# Patient Record
Sex: MALE | Race: WHITE | NOT HISPANIC OR LATINO | Employment: FULL TIME | URBAN - METROPOLITAN AREA
[De-identification: names, ages, dates, MRNs, and addresses within clinical notes are randomized per-mention and may not be internally consistent; named-entity substitution may affect disease eponyms.]

---

## 2018-12-21 ENCOUNTER — APPOINTMENT (OUTPATIENT)
Dept: RADIOLOGY | Age: 48
End: 2018-12-21
Payer: COMMERCIAL

## 2018-12-21 ENCOUNTER — TRANSCRIBE ORDERS (OUTPATIENT)
Dept: ADMINISTRATIVE | Age: 48
End: 2018-12-21

## 2018-12-21 DIAGNOSIS — R05.9 COUGH: Primary | ICD-10-CM

## 2018-12-21 DIAGNOSIS — R05.9 COUGH: ICD-10-CM

## 2018-12-21 PROCEDURE — 71046 X-RAY EXAM CHEST 2 VIEWS: CPT

## 2019-03-15 ENCOUNTER — TRANSCRIBE ORDERS (OUTPATIENT)
Dept: ADMINISTRATIVE | Facility: HOSPITAL | Age: 49
End: 2019-03-15

## 2019-03-15 DIAGNOSIS — R56.9 CONVULSIONS, UNSPECIFIED CONVULSION TYPE (HCC): Primary | ICD-10-CM

## 2019-03-15 DIAGNOSIS — R06.02 SHORTNESS OF BREATH: ICD-10-CM

## 2020-10-05 ENCOUNTER — OFFICE VISIT (OUTPATIENT)
Dept: INTERNAL MEDICINE CLINIC | Facility: CLINIC | Age: 50
End: 2020-10-05
Payer: COMMERCIAL

## 2020-10-05 VITALS
TEMPERATURE: 97.7 F | SYSTOLIC BLOOD PRESSURE: 142 MMHG | DIASTOLIC BLOOD PRESSURE: 89 MMHG | OXYGEN SATURATION: 96 % | HEART RATE: 98 BPM | HEIGHT: 75 IN | WEIGHT: 295.4 LBS | BODY MASS INDEX: 36.73 KG/M2

## 2020-10-05 DIAGNOSIS — J45.21 MILD INTERMITTENT ASTHMA WITH EXACERBATION: Primary | ICD-10-CM

## 2020-10-05 DIAGNOSIS — K57.30 DIVERTICULOSIS LARGE INTESTINE W/O PERFORATION OR ABSCESS W/O BLEEDING: ICD-10-CM

## 2020-10-05 DIAGNOSIS — M10.00 IDIOPATHIC GOUT, UNSPECIFIED CHRONICITY, UNSPECIFIED SITE: ICD-10-CM

## 2020-10-05 DIAGNOSIS — J45.20 MILD INTERMITTENT ASTHMA WITHOUT COMPLICATION: ICD-10-CM

## 2020-10-05 DIAGNOSIS — J04.2 ACUTE LARYNGOTRACHEITIS: ICD-10-CM

## 2020-10-05 PROCEDURE — 3725F SCREEN DEPRESSION PERFORMED: CPT | Performed by: INTERNAL MEDICINE

## 2020-10-05 PROCEDURE — 99214 OFFICE O/P EST MOD 30 MIN: CPT | Performed by: INTERNAL MEDICINE

## 2020-10-05 RX ORDER — PREDNISONE 20 MG/1
20 TABLET ORAL DAILY
Qty: 7 TABLET | Refills: 0 | Status: SHIPPED | OUTPATIENT
Start: 2020-10-05 | End: 2020-10-12

## 2020-10-05 RX ORDER — COLCHICINE 0.6 MG/1
0.6 TABLET ORAL DAILY PRN
COMMUNITY
End: 2020-10-14 | Stop reason: SDUPTHER

## 2020-10-05 RX ORDER — BUDESONIDE AND FORMOTEROL FUMARATE DIHYDRATE 160; 4.5 UG/1; UG/1
2 AEROSOL RESPIRATORY (INHALATION) 2 TIMES DAILY
Qty: 1 INHALER | Refills: 0 | Status: SHIPPED | OUTPATIENT
Start: 2020-10-05 | End: 2021-05-13

## 2020-10-05 RX ORDER — ALBUTEROL SULFATE 90 UG/1
2 AEROSOL, METERED RESPIRATORY (INHALATION) EVERY 6 HOURS PRN
COMMUNITY
End: 2021-12-29 | Stop reason: SDUPTHER

## 2020-10-05 RX ORDER — ALLOPURINOL 100 MG/1
100 TABLET ORAL DAILY
COMMUNITY
End: 2021-01-12 | Stop reason: SDUPTHER

## 2020-10-14 ENCOUNTER — OFFICE VISIT (OUTPATIENT)
Dept: INTERNAL MEDICINE CLINIC | Facility: CLINIC | Age: 50
End: 2020-10-14
Payer: COMMERCIAL

## 2020-10-14 VITALS
WEIGHT: 293.6 LBS | HEIGHT: 75 IN | HEART RATE: 92 BPM | TEMPERATURE: 97.7 F | BODY MASS INDEX: 36.5 KG/M2 | DIASTOLIC BLOOD PRESSURE: 82 MMHG | SYSTOLIC BLOOD PRESSURE: 136 MMHG | OXYGEN SATURATION: 96 %

## 2020-10-14 DIAGNOSIS — M10.00 IDIOPATHIC GOUT, UNSPECIFIED CHRONICITY, UNSPECIFIED SITE: Primary | ICD-10-CM

## 2020-10-14 DIAGNOSIS — J45.20 MILD INTERMITTENT ASTHMA WITHOUT COMPLICATION: ICD-10-CM

## 2020-10-14 PROCEDURE — 1036F TOBACCO NON-USER: CPT | Performed by: INTERNAL MEDICINE

## 2020-10-14 PROCEDURE — 99214 OFFICE O/P EST MOD 30 MIN: CPT | Performed by: INTERNAL MEDICINE

## 2020-10-14 RX ORDER — COLCHICINE 0.6 MG/1
0.6 TABLET ORAL 2 TIMES DAILY
Qty: 40 TABLET | Refills: 0 | Status: SHIPPED | OUTPATIENT
Start: 2020-10-14 | End: 2020-11-11

## 2020-10-14 RX ORDER — COLCHICINE 0.6 MG/1
0.6 TABLET ORAL 2 TIMES DAILY
Qty: 40 TABLET | Refills: 0 | Status: SHIPPED | OUTPATIENT
Start: 2020-10-14 | End: 2020-10-14

## 2020-10-14 RX ORDER — PREDNISONE 10 MG/1
10 TABLET ORAL DAILY
COMMUNITY
End: 2020-10-14

## 2020-10-15 ENCOUNTER — TELEPHONE (OUTPATIENT)
Dept: INTERNAL MEDICINE CLINIC | Facility: CLINIC | Age: 50
End: 2020-10-15

## 2020-10-15 DIAGNOSIS — K21.9 GASTROESOPHAGEAL REFLUX DISEASE WITHOUT ESOPHAGITIS: ICD-10-CM

## 2020-10-15 DIAGNOSIS — M25.562 ACUTE PAIN OF LEFT KNEE: Primary | ICD-10-CM

## 2020-10-15 RX ORDER — DICLOFENAC SODIUM 75 MG/1
75 TABLET, DELAYED RELEASE ORAL 2 TIMES DAILY
Qty: 10 TABLET | Refills: 0 | Status: SHIPPED | OUTPATIENT
Start: 2020-10-15 | End: 2020-10-15

## 2020-10-15 RX ORDER — OMEPRAZOLE 20 MG/1
20 CAPSULE, DELAYED RELEASE ORAL
Qty: 15 CAPSULE | Refills: 0 | Status: SHIPPED | OUTPATIENT
Start: 2020-10-15 | End: 2020-12-07

## 2020-10-15 RX ORDER — OMEPRAZOLE 20 MG/1
20 CAPSULE, DELAYED RELEASE ORAL
Qty: 15 CAPSULE | Refills: 0 | Status: SHIPPED | OUTPATIENT
Start: 2020-10-15 | End: 2020-10-15

## 2020-10-15 RX ORDER — DICLOFENAC SODIUM 75 MG/1
75 TABLET, DELAYED RELEASE ORAL 2 TIMES DAILY
Qty: 10 TABLET | Refills: 0 | Status: SHIPPED | OUTPATIENT
Start: 2020-10-15 | End: 2021-01-12 | Stop reason: SDUPTHER

## 2020-10-16 ENCOUNTER — TELEPHONE (OUTPATIENT)
Dept: INTERNAL MEDICINE CLINIC | Facility: CLINIC | Age: 50
End: 2020-10-16

## 2020-11-11 DIAGNOSIS — M10.00 IDIOPATHIC GOUT, UNSPECIFIED CHRONICITY, UNSPECIFIED SITE: ICD-10-CM

## 2020-11-11 RX ORDER — COLCHICINE 0.6 MG/1
0.6 TABLET ORAL 2 TIMES DAILY
Qty: 40 TABLET | Refills: 0 | Status: SHIPPED | OUTPATIENT
Start: 2020-11-11 | End: 2020-11-20 | Stop reason: SDUPTHER

## 2020-11-20 ENCOUNTER — OFFICE VISIT (OUTPATIENT)
Dept: INTERNAL MEDICINE CLINIC | Facility: CLINIC | Age: 50
End: 2020-11-20
Payer: COMMERCIAL

## 2020-11-20 VITALS
OXYGEN SATURATION: 98 % | TEMPERATURE: 97 F | HEIGHT: 75 IN | BODY MASS INDEX: 36.26 KG/M2 | DIASTOLIC BLOOD PRESSURE: 82 MMHG | HEART RATE: 96 BPM | WEIGHT: 291.6 LBS | SYSTOLIC BLOOD PRESSURE: 128 MMHG

## 2020-11-20 DIAGNOSIS — J45.20 MILD INTERMITTENT ASTHMA WITHOUT COMPLICATION: ICD-10-CM

## 2020-11-20 DIAGNOSIS — Z23 NEED FOR IMMUNIZATION AGAINST INFLUENZA: ICD-10-CM

## 2020-11-20 DIAGNOSIS — M10.00 IDIOPATHIC GOUT, UNSPECIFIED CHRONICITY, UNSPECIFIED SITE: Primary | ICD-10-CM

## 2020-11-20 DIAGNOSIS — K57.30 DIVERTICULOSIS LARGE INTESTINE W/O PERFORATION OR ABSCESS W/O BLEEDING: ICD-10-CM

## 2020-11-20 PROCEDURE — 90471 IMMUNIZATION ADMIN: CPT | Performed by: INTERNAL MEDICINE

## 2020-11-20 PROCEDURE — 99214 OFFICE O/P EST MOD 30 MIN: CPT | Performed by: INTERNAL MEDICINE

## 2020-11-20 PROCEDURE — 3008F BODY MASS INDEX DOCD: CPT | Performed by: INTERNAL MEDICINE

## 2020-11-20 PROCEDURE — 1036F TOBACCO NON-USER: CPT | Performed by: INTERNAL MEDICINE

## 2020-11-20 PROCEDURE — 3725F SCREEN DEPRESSION PERFORMED: CPT | Performed by: INTERNAL MEDICINE

## 2020-11-20 PROCEDURE — 90686 IIV4 VACC NO PRSV 0.5 ML IM: CPT | Performed by: INTERNAL MEDICINE

## 2020-11-20 RX ORDER — COLCHICINE 0.6 MG/1
0.6 TABLET ORAL 2 TIMES DAILY
Qty: 60 TABLET | Refills: 1 | Status: SHIPPED | OUTPATIENT
Start: 2020-11-20 | End: 2021-05-21 | Stop reason: SDUPTHER

## 2020-12-06 DIAGNOSIS — K21.9 GASTROESOPHAGEAL REFLUX DISEASE WITHOUT ESOPHAGITIS: ICD-10-CM

## 2020-12-07 RX ORDER — OMEPRAZOLE 20 MG/1
20 CAPSULE, DELAYED RELEASE ORAL
Qty: 60 CAPSULE | Refills: 0 | Status: SHIPPED | OUTPATIENT
Start: 2020-12-07 | End: 2021-01-12 | Stop reason: SDUPTHER

## 2021-01-08 ENCOUNTER — TELEPHONE (OUTPATIENT)
Dept: INTERNAL MEDICINE CLINIC | Facility: CLINIC | Age: 51
End: 2021-01-08

## 2021-01-08 DIAGNOSIS — R42 DIZZINESS: Primary | ICD-10-CM

## 2021-01-08 RX ORDER — MECLIZINE HYDROCHLORIDE 25 MG/1
25 TABLET ORAL 3 TIMES DAILY PRN
Qty: 30 TABLET | Refills: 0 | Status: SHIPPED | OUTPATIENT
Start: 2021-01-08 | End: 2022-05-24

## 2021-01-08 NOTE — TELEPHONE ENCOUNTER
Pt wife called and asked if you can give her a call when you have the chance  She says it is in regards to the pt experiencing vertigo, dizziness, and diarrhea

## 2021-01-08 NOTE — TELEPHONE ENCOUNTER
PT called stating he is having bad vertigo  Has not had issues for the past several years       Requesting a call back at: [phone]

## 2021-01-12 ENCOUNTER — OFFICE VISIT (OUTPATIENT)
Dept: INTERNAL MEDICINE CLINIC | Facility: CLINIC | Age: 51
End: 2021-01-12
Payer: COMMERCIAL

## 2021-01-12 VITALS
DIASTOLIC BLOOD PRESSURE: 86 MMHG | TEMPERATURE: 98 F | OXYGEN SATURATION: 98 % | SYSTOLIC BLOOD PRESSURE: 138 MMHG | HEART RATE: 92 BPM | HEIGHT: 75 IN | BODY MASS INDEX: 36.06 KG/M2 | WEIGHT: 290 LBS

## 2021-01-12 DIAGNOSIS — M10.00 IDIOPATHIC GOUT, UNSPECIFIED CHRONICITY, UNSPECIFIED SITE: Primary | ICD-10-CM

## 2021-01-12 DIAGNOSIS — J45.20 MILD INTERMITTENT ASTHMA WITHOUT COMPLICATION: ICD-10-CM

## 2021-01-12 DIAGNOSIS — K21.9 GASTROESOPHAGEAL REFLUX DISEASE WITHOUT ESOPHAGITIS: ICD-10-CM

## 2021-01-12 DIAGNOSIS — M25.562 ACUTE PAIN OF LEFT KNEE: ICD-10-CM

## 2021-01-12 PROCEDURE — 99213 OFFICE O/P EST LOW 20 MIN: CPT | Performed by: INTERNAL MEDICINE

## 2021-01-12 PROCEDURE — 1036F TOBACCO NON-USER: CPT | Performed by: INTERNAL MEDICINE

## 2021-01-12 PROCEDURE — 3008F BODY MASS INDEX DOCD: CPT | Performed by: INTERNAL MEDICINE

## 2021-01-12 RX ORDER — DICLOFENAC SODIUM 75 MG/1
75 TABLET, DELAYED RELEASE ORAL 2 TIMES DAILY
Qty: 20 TABLET | Refills: 0 | Status: SHIPPED | OUTPATIENT
Start: 2021-01-12 | End: 2021-05-18 | Stop reason: SDUPTHER

## 2021-01-12 RX ORDER — ALLOPURINOL 100 MG/1
100 TABLET ORAL DAILY
Qty: 30 TABLET | Refills: 3 | Status: SHIPPED | OUTPATIENT
Start: 2021-01-12 | End: 2021-05-21 | Stop reason: SDUPTHER

## 2021-01-12 RX ORDER — OMEPRAZOLE 20 MG/1
20 CAPSULE, DELAYED RELEASE ORAL
Qty: 30 CAPSULE | Refills: 0 | Status: SHIPPED | OUTPATIENT
Start: 2021-01-12 | End: 2021-02-03

## 2021-01-12 NOTE — PROGRESS NOTES
Assessment/Plan:    BMI Counseling: Body mass index is 36 25 kg/m²  The BMI is above normal  Nutrition recommendations include decreasing portion sizes, encouraging healthy choices of fruits and vegetables and decreasing fast food intake  Exercise recommendations include moderate physical activity 150 minutes/week  1  Idiopathic gout, unspecified chronicity, unspecified site  -     allopurinol (ZYLOPRIM) 100 mg tablet; Take 1 tablet (100 mg total) by mouth daily    2  Mild intermittent asthma without complication    3  Gastroesophageal reflux disease without esophagitis  -     omeprazole (PriLOSEC) 20 mg delayed release capsule; Take 1 capsule (20 mg total) by mouth daily before breakfast    4  Acute pain of left knee  -     diclofenac (VOLTAREN) 75 mg EC tablet; Take 1 tablet (75 mg total) by mouth 2 (two) times a day    5  Body mass index 36 0-36 9, adult           Subjective:      Patient ID: Reema Lynn is a 48 y o  male  Left foot pain, woke up this morning with the pain no trauma      The following portions of the patient's history were reviewed and updated as appropriate: He  has a past medical history of Asthma, Asthmatic bronchitis, Body mass index 34 0-34 9, adult, Convulsions ([address] ), Diverticulitis, Gastric ulcer, Gout, Left shoulder pain, Lumbar disc herniation with radiculopathy, Osteoarthritis, Pharyngitis, Right knee pain, Rotator cuff syndrome of left shoulder, Routine general medical examination at a health care facility, SOB (shortness of breath), Spontaneous rupture of tendon of left shoulder, Syncope and collapse, and Vestibular neuritis    He   Patient Active Problem List    Diagnosis Date Noted    Gastroesophageal reflux disease without esophagitis 01/12/2021    Diverticulosis large intestine w/o perforation or abscess w/o bleeding 10/05/2020    Body mass index 36 0-36 9, adult 10/05/2020    Gout     Asthma     Diverticulitis      He  has a past surgical history that includes Colon surgery and Sinus surgery  His family history includes Hyperlipidemia in his mother; Hypertension in his mother  He  reports that he has never smoked  He has never used smokeless tobacco  He reports previous alcohol use  No history on file for drug  Current Outpatient Medications   Medication Sig Dispense Refill    albuterol (PROVENTIL HFA,VENTOLIN HFA) 90 mcg/act inhaler Inhale 2 puffs every 6 (six) hours as needed for wheezing      colchicine (COLCRYS) 0 6 mg tablet Take 1 tablet (0 6 mg total) by mouth 2 (two) times a day 60 tablet 1    meclizine (ANTIVERT) 25 mg tablet Take 1 tablet (25 mg total) by mouth 3 (three) times a day as needed for dizziness 30 tablet 0    allopurinol (ZYLOPRIM) 100 mg tablet Take 1 tablet (100 mg total) by mouth daily 30 tablet 3    budesonide-formoterol (SYMBICORT) 160-4 5 mcg/act inhaler Inhale 2 puffs 2 (two) times a day Rinse mouth after use  (Patient not taking: Reported on 11/20/2020) 1 Inhaler 0    diclofenac (VOLTAREN) 75 mg EC tablet Take 1 tablet (75 mg total) by mouth 2 (two) times a day 20 tablet 0    omeprazole (PriLOSEC) 20 mg delayed release capsule Take 1 capsule (20 mg total) by mouth daily before breakfast 30 capsule 0     No current facility-administered medications for this visit  Current Outpatient Medications on File Prior to Visit   Medication Sig    albuterol (PROVENTIL HFA,VENTOLIN HFA) 90 mcg/act inhaler Inhale 2 puffs every 6 (six) hours as needed for wheezing    colchicine (COLCRYS) 0 6 mg tablet Take 1 tablet (0 6 mg total) by mouth 2 (two) times a day    meclizine (ANTIVERT) 25 mg tablet Take 1 tablet (25 mg total) by mouth 3 (three) times a day as needed for dizziness    budesonide-formoterol (SYMBICORT) 160-4 5 mcg/act inhaler Inhale 2 puffs 2 (two) times a day Rinse mouth after use   (Patient not taking: Reported on 11/20/2020)    [DISCONTINUED] allopurinol (ZYLOPRIM) 100 mg tablet Take 100 mg by mouth daily    [DISCONTINUED] diclofenac (VOLTAREN) 75 mg EC tablet Take 1 tablet (75 mg total) by mouth 2 (two) times a day (Patient not taking: Reported on 11/20/2020)    [DISCONTINUED] diclofenac sodium (VOLTAREN) 1 % Apply 2 g topically 4 (four) times a day (Patient not taking: Reported on 1/12/2021)    [DISCONTINUED] HYDROcodone-homatropine (HYCODAN) 5-1 5 mg/5 mL syrup Take 5 mL by mouth 4 (four) times a day as needed for coughMax Daily Amount: 20 mL (Patient not taking: Reported on 11/20/2020)    [DISCONTINUED] omeprazole (PriLOSEC) 20 mg delayed release capsule TAKE 1 CAPSULE (20 MG TOTAL) BY MOUTH DAILY BEFORE BREAKFAST (Patient not taking: Reported on 1/12/2021)     No current facility-administered medications on file prior to visit  He is allergic to cefazolin; metronidazole; and levofloxacin       Review of Systems   Constitutional: Negative for chills and fever  HENT: Negative for congestion, ear pain and sore throat  Eyes: Negative for pain  Respiratory: Negative for cough and shortness of breath  Cardiovascular: Negative for chest pain and leg swelling  Gastrointestinal: Negative for abdominal pain, nausea and vomiting  Endocrine: Negative for polyuria  Genitourinary: Negative for difficulty urinating, frequency and urgency  Musculoskeletal: Positive for arthralgias  Negative for back pain  Skin: Negative for rash  Neurological: Negative for weakness and headaches  Psychiatric/Behavioral: Negative for sleep disturbance  The patient is not nervous/anxious  Objective:      /86 (BP Location: Right arm, Patient Position: Sitting, Cuff Size: Standard)   Pulse 92   Temp 98 °F (36 7 °C) (Temporal)   Ht 6' 3" (1 905 m)   Wt 132 kg (290 lb)   SpO2 98%   BMI 36 25 kg/m²     No results found for this or any previous visit (from the past 1344 hour(s))  Physical Exam  Constitutional:       Appearance: Normal appearance  HENT:      Head: Normocephalic        Right Ear: Tympanic membrane, ear canal and external ear normal       Left Ear: Tympanic membrane, ear canal and external ear normal       Nose: Nose normal  No congestion  Mouth/Throat:      Mouth: Mucous membranes are moist       Pharynx: Oropharynx is clear  No oropharyngeal exudate or posterior oropharyngeal erythema  Eyes:      Extraocular Movements: Extraocular movements intact  Conjunctiva/sclera: Conjunctivae normal       Pupils: Pupils are equal, round, and reactive to light  Neck:      Musculoskeletal: Normal range of motion and neck supple  Cardiovascular:      Rate and Rhythm: Normal rate and regular rhythm  Heart sounds: Normal heart sounds  No murmur  Pulmonary:      Effort: Pulmonary effort is normal       Breath sounds: Normal breath sounds  No wheezing or rales  Abdominal:      General: Bowel sounds are normal  There is no distension  Palpations: Abdomen is soft  Tenderness: There is no abdominal tenderness  Musculoskeletal: Normal range of motion  Right lower leg: No edema  Left lower leg: No edema  Comments: Left foot the 5th metatarsal phalangeal joint tender movement painful swelling present skin minimal redness   Lymphadenopathy:      Cervical: No cervical adenopathy  Skin:     General: Skin is warm  Neurological:      General: No focal deficit present  Mental Status: He is alert and oriented to person, place, and time

## 2021-02-03 DIAGNOSIS — K21.9 GASTROESOPHAGEAL REFLUX DISEASE WITHOUT ESOPHAGITIS: ICD-10-CM

## 2021-02-03 RX ORDER — OMEPRAZOLE 20 MG/1
20 CAPSULE, DELAYED RELEASE ORAL
Qty: 60 CAPSULE | Refills: 0 | Status: SHIPPED | OUTPATIENT
Start: 2021-02-03 | End: 2021-02-19

## 2021-02-18 DIAGNOSIS — K21.9 GASTROESOPHAGEAL REFLUX DISEASE WITHOUT ESOPHAGITIS: ICD-10-CM

## 2021-02-19 RX ORDER — OMEPRAZOLE 20 MG/1
20 CAPSULE, DELAYED RELEASE ORAL
Qty: 90 CAPSULE | Refills: 1 | Status: SHIPPED | OUTPATIENT
Start: 2021-02-19

## 2021-03-10 DIAGNOSIS — Z23 ENCOUNTER FOR IMMUNIZATION: ICD-10-CM

## 2021-03-22 ENCOUNTER — IMMUNIZATIONS (OUTPATIENT)
Dept: FAMILY MEDICINE CLINIC | Facility: HOSPITAL | Age: 51
End: 2021-03-22

## 2021-03-22 DIAGNOSIS — Z23 ENCOUNTER FOR IMMUNIZATION: Primary | ICD-10-CM

## 2021-03-22 PROCEDURE — 0001A SARS-COV-2 / COVID-19 MRNA VACCINE (PFIZER-BIONTECH) 30 MCG: CPT

## 2021-03-22 PROCEDURE — 91300 SARS-COV-2 / COVID-19 MRNA VACCINE (PFIZER-BIONTECH) 30 MCG: CPT

## 2021-04-14 ENCOUNTER — IMMUNIZATIONS (OUTPATIENT)
Dept: FAMILY MEDICINE CLINIC | Facility: HOSPITAL | Age: 51
End: 2021-04-14

## 2021-04-14 DIAGNOSIS — Z23 ENCOUNTER FOR IMMUNIZATION: Primary | ICD-10-CM

## 2021-04-14 PROCEDURE — 0002A SARS-COV-2 / COVID-19 MRNA VACCINE (PFIZER-BIONTECH) 30 MCG: CPT

## 2021-04-14 PROCEDURE — 91300 SARS-COV-2 / COVID-19 MRNA VACCINE (PFIZER-BIONTECH) 30 MCG: CPT

## 2021-05-07 ENCOUNTER — TELEPHONE (OUTPATIENT)
Dept: INTERNAL MEDICINE CLINIC | Facility: CLINIC | Age: 51
End: 2021-05-07

## 2021-05-07 DIAGNOSIS — M51.26 DISC DISPLACEMENT, LUMBAR: Primary | ICD-10-CM

## 2021-05-07 RX ORDER — TRAMADOL HYDROCHLORIDE 50 MG/1
50 TABLET ORAL EVERY 8 HOURS PRN
Qty: 20 TABLET | Refills: 0 | Status: SHIPPED | OUTPATIENT
Start: 2021-05-07 | End: 2021-05-13 | Stop reason: SDUPTHER

## 2021-05-07 RX ORDER — CYCLOBENZAPRINE HCL 10 MG
10 TABLET ORAL 3 TIMES DAILY PRN
Qty: 20 TABLET | Refills: 0 | Status: SHIPPED | OUTPATIENT
Start: 2021-05-07 | End: 2021-05-13 | Stop reason: SDUPTHER

## 2021-05-07 RX ORDER — METHYLPREDNISOLONE 4 MG/1
TABLET ORAL
Qty: 21 EACH | Refills: 0 | Status: SHIPPED | OUTPATIENT
Start: 2021-05-07 | End: 2021-05-19

## 2021-05-13 ENCOUNTER — OFFICE VISIT (OUTPATIENT)
Dept: INTERNAL MEDICINE CLINIC | Facility: CLINIC | Age: 51
End: 2021-05-13
Payer: COMMERCIAL

## 2021-05-13 VITALS
HEIGHT: 75 IN | WEIGHT: 295 LBS | DIASTOLIC BLOOD PRESSURE: 86 MMHG | HEART RATE: 92 BPM | OXYGEN SATURATION: 97 % | BODY MASS INDEX: 36.68 KG/M2 | TEMPERATURE: 98.2 F | SYSTOLIC BLOOD PRESSURE: 136 MMHG

## 2021-05-13 DIAGNOSIS — J45.20 MILD INTERMITTENT ASTHMA WITHOUT COMPLICATION: ICD-10-CM

## 2021-05-13 DIAGNOSIS — K21.9 GASTROESOPHAGEAL REFLUX DISEASE WITHOUT ESOPHAGITIS: ICD-10-CM

## 2021-05-13 DIAGNOSIS — M51.16 LUMBAR DISC HERNIATION WITH RADICULOPATHY: Primary | ICD-10-CM

## 2021-05-13 PROCEDURE — 3725F SCREEN DEPRESSION PERFORMED: CPT | Performed by: INTERNAL MEDICINE

## 2021-05-13 PROCEDURE — 99214 OFFICE O/P EST MOD 30 MIN: CPT | Performed by: INTERNAL MEDICINE

## 2021-05-13 RX ORDER — CYCLOBENZAPRINE HCL 10 MG
10 TABLET ORAL 3 TIMES DAILY PRN
Qty: 20 TABLET | Refills: 0 | Status: SHIPPED | OUTPATIENT
Start: 2021-05-13 | End: 2021-05-19

## 2021-05-13 RX ORDER — IBUPROFEN 800 MG/1
800 TABLET ORAL EVERY 8 HOURS SCHEDULED
Qty: 30 TABLET | Refills: 0 | Status: SHIPPED | OUTPATIENT
Start: 2021-05-13 | End: 2021-05-19

## 2021-05-13 RX ORDER — TRAMADOL HYDROCHLORIDE 50 MG/1
50 TABLET ORAL EVERY 8 HOURS PRN
Qty: 20 TABLET | Refills: 0 | Status: SHIPPED | OUTPATIENT
Start: 2021-05-13 | End: 2022-05-24

## 2021-05-13 NOTE — PROGRESS NOTES
Assessment/Plan:             1  Lumbar disc herniation with radiculopathy  -     MRI lumbar spine wo contrast; Future; Expected date: 05/13/2021  -     ibuprofen (MOTRIN) 800 mg tablet; Take 1 tablet (800 mg total) by mouth every 8 (eight) hours  -     cyclobenzaprine (FLEXERIL) 10 mg tablet; Take 1 tablet (10 mg total) by mouth 3 (three) times a day as needed for muscle spasms  -     traMADol (ULTRAM) 50 mg tablet; Take 1 tablet (50 mg total) by mouth every 8 (eight) hours as needed for moderate pain    2  Mild intermittent asthma without complication    3  Gastroesophageal reflux disease without esophagitis           Subjective:      Patient ID: Lulu Darby is a 46 y o  male  Low back pain, denies any trauma, does have lumbar disc disease, also recently started to her difficulty with the urine where he feels like he has to push more to pee not having good control on the pee, feels like leaking sometimes    Back Pain  Pertinent negatives include no abdominal pain, chest pain, dysuria, fever, headaches or weakness  The following portions of the patient's history were reviewed and updated as appropriate: He  has a past medical history of Asthma, Asthmatic bronchitis, Body mass index 34 0-34 9, adult, Convulsions ([facility] [address] ), Diverticulitis, Diverticulosis, Gastric ulcer, Gout, Left shoulder pain, Lumbar disc herniation with radiculopathy, Osteoarthritis, Pharyngitis, Pulmonary embolism ([facility] [address] ) (08/15/2012), Right knee pain, Rotator cuff syndrome of left shoulder, Routine general medical examination at a health care facility, SOB (shortness of breath), Spontaneous rupture of tendon of left shoulder, Syncope and collapse, and Vestibular neuritis    He   Patient Active Problem List    Diagnosis Date Noted    Lumbar disc herniation with radiculopathy     Gastroesophageal reflux disease without esophagitis 01/12/2021    Diverticulosis large intestine w/o perforation or abscess w/o bleeding 10/05/2020    Body mass index 36 0-36 9, adult 10/05/2020    Gout     Asthma     Diverticulitis      He  has a past surgical history that includes Sinus surgery; EGD AND COLONOSCOPY (01/31/2011); and Colon surgery (08/08/2012)  His family history includes Hyperlipidemia in his mother; Hypertension in his mother  He  reports that he has never smoked  He has never used smokeless tobacco  He reports previous alcohol use  No history on file for drug  Current Outpatient Medications   Medication Sig Dispense Refill    albuterol (PROVENTIL HFA,VENTOLIN HFA) 90 mcg/act inhaler Inhale 2 puffs every 6 (six) hours as needed for wheezing      allopurinol (ZYLOPRIM) 100 mg tablet Take 1 tablet (100 mg total) by mouth daily 30 tablet 3    colchicine (COLCRYS) 0 6 mg tablet Take 1 tablet (0 6 mg total) by mouth 2 (two) times a day 60 tablet 1    cyclobenzaprine (FLEXERIL) 10 mg tablet Take 1 tablet (10 mg total) by mouth 3 (three) times a day as needed for muscle spasms 20 tablet 0    diclofenac (VOLTAREN) 75 mg EC tablet Take 1 tablet (75 mg total) by mouth 2 (two) times a day 20 tablet 0    omeprazole (PriLOSEC) 20 mg delayed release capsule TAKE 1 CAPSULE (20 MG TOTAL) BY MOUTH DAILY BEFORE BREAKFAST 90 capsule 1    traMADol (ULTRAM) 50 mg tablet Take 1 tablet (50 mg total) by mouth every 8 (eight) hours as needed for moderate pain 20 tablet 0    ibuprofen (MOTRIN) 800 mg tablet Take 1 tablet (800 mg total) by mouth every 8 (eight) hours 30 tablet 0    meclizine (ANTIVERT) 25 mg tablet Take 1 tablet (25 mg total) by mouth 3 (three) times a day as needed for dizziness (Patient not taking: Reported on 5/13/2021) 30 tablet 0    methylPREDNISolone 4 MG tablet therapy pack Use as directed on package (Patient not taking: Reported on 5/13/2021) 21 each 0     No current facility-administered medications for this visit        Current Outpatient Medications on File Prior to Visit   Medication Sig    albuterol (PROVENTIL HFA,VENTOLIN HFA) 90 mcg/act inhaler Inhale 2 puffs every 6 (six) hours as needed for wheezing    allopurinol (ZYLOPRIM) 100 mg tablet Take 1 tablet (100 mg total) by mouth daily    colchicine (COLCRYS) 0 6 mg tablet Take 1 tablet (0 6 mg total) by mouth 2 (two) times a day    diclofenac (VOLTAREN) 75 mg EC tablet Take 1 tablet (75 mg total) by mouth 2 (two) times a day    omeprazole (PriLOSEC) 20 mg delayed release capsule TAKE 1 CAPSULE (20 MG TOTAL) BY MOUTH DAILY BEFORE BREAKFAST    [DISCONTINUED] cyclobenzaprine (FLEXERIL) 10 mg tablet Take 1 tablet (10 mg total) by mouth 3 (three) times a day as needed for muscle spasms    [DISCONTINUED] traMADol (ULTRAM) 50 mg tablet Take 1 tablet (50 mg total) by mouth every 8 (eight) hours as needed for moderate pain    meclizine (ANTIVERT) 25 mg tablet Take 1 tablet (25 mg total) by mouth 3 (three) times a day as needed for dizziness (Patient not taking: Reported on 5/13/2021)    methylPREDNISolone 4 MG tablet therapy pack Use as directed on package (Patient not taking: Reported on 5/13/2021)    [DISCONTINUED] budesonide-formoterol (SYMBICORT) 160-4 5 mcg/act inhaler Inhale 2 puffs 2 (two) times a day Rinse mouth after use  (Patient not taking: Reported on 11/20/2020)     No current facility-administered medications on file prior to visit  He is allergic to cefazolin; metronidazole; and levofloxacin       Review of Systems   Constitutional: Negative for chills and fever  HENT: Negative for congestion, ear pain and sore throat  Eyes: Negative for pain  Respiratory: Negative for cough and shortness of breath  Cardiovascular: Negative for chest pain and leg swelling  Gastrointestinal: Negative for abdominal pain, nausea and vomiting  Endocrine: Negative for polyuria  Genitourinary: Positive for difficulty urinating  Negative for dysuria, frequency and urgency  Musculoskeletal: Positive for back pain  Negative for arthralgias  Skin: Negative for rash  Neurological: Negative for weakness and headaches  Psychiatric/Behavioral: Negative for sleep disturbance  The patient is not nervous/anxious  Objective:      /86 (BP Location: Left arm, Patient Position: Sitting, Cuff Size: Standard)   Pulse 92   Temp 98 2 °F (36 8 °C) (Temporal)   Ht 6' 3" (1 905 m)   Wt 134 kg (295 lb)   SpO2 97%   BMI 36 87 kg/m²     No results found for this or any previous visit (from the past 1344 hour(s))  Physical Exam  Constitutional:       Appearance: Normal appearance  HENT:      Head: Normocephalic  Right Ear: Tympanic membrane, ear canal and external ear normal       Left Ear: Tympanic membrane, ear canal and external ear normal       Nose: Nose normal  No congestion  Mouth/Throat:      Mouth: Mucous membranes are moist       Pharynx: Oropharynx is clear  No oropharyngeal exudate or posterior oropharyngeal erythema  Eyes:      Extraocular Movements: Extraocular movements intact  Conjunctiva/sclera: Conjunctivae normal       Pupils: Pupils are equal, round, and reactive to light  Neck:      Musculoskeletal: Normal range of motion and neck supple  Cardiovascular:      Rate and Rhythm: Normal rate and regular rhythm  Heart sounds: Normal heart sounds  No murmur  Pulmonary:      Effort: Pulmonary effort is normal       Breath sounds: Normal breath sounds  No wheezing or rales  Abdominal:      General: Bowel sounds are normal  There is no distension  Palpations: Abdomen is soft  Tenderness: There is no abdominal tenderness  Musculoskeletal:      Right lower leg: No edema  Left lower leg: No edema  Comments: Low back exam-right side paraspinous tenderness present, SLR positive bilaterally, movement painful,   Lymphadenopathy:      Cervical: No cervical adenopathy  Skin:     General: Skin is warm  Neurological:      General: No focal deficit present        Mental Status: He is alert and oriented to person, place, and time        Deep Tendon Reflexes: Reflexes normal       Comments: No saddle shaped Anesthesia

## 2021-05-18 DIAGNOSIS — M25.562 ACUTE PAIN OF LEFT KNEE: ICD-10-CM

## 2021-05-18 NOTE — TELEPHONE ENCOUNTER
HIS BACK IS FEELING BETTER BUT HE IS HAVING A HARD TIME MOVING HIS RIGHT LEG AND THINKS IT COULD BE RELATED, HE SAID HE CANT BEND, LAY ON IT OR WALK, NO REDNESS OR RASH HE THINKS IT IS HIS BACK AND HE SAID BEFORE YOU GAVE HIM SOMETHING AND IT WORKED FOR HIM, HE WOULD LIKE TO SPEAK TO YOU ABOUT IT AND ASK THAT YOU CALL HIM

## 2021-05-19 ENCOUNTER — OFFICE VISIT (OUTPATIENT)
Dept: INTERNAL MEDICINE CLINIC | Facility: CLINIC | Age: 51
End: 2021-05-19
Payer: COMMERCIAL

## 2021-05-19 VITALS
BODY MASS INDEX: 35.56 KG/M2 | WEIGHT: 286 LBS | OXYGEN SATURATION: 96 % | SYSTOLIC BLOOD PRESSURE: 136 MMHG | HEART RATE: 120 BPM | TEMPERATURE: 99 F | HEIGHT: 75 IN | DIASTOLIC BLOOD PRESSURE: 82 MMHG

## 2021-05-19 DIAGNOSIS — J45.20 MILD INTERMITTENT ASTHMA WITHOUT COMPLICATION: ICD-10-CM

## 2021-05-19 DIAGNOSIS — M51.16 LUMBAR DISC HERNIATION WITH RADICULOPATHY: Primary | ICD-10-CM

## 2021-05-19 DIAGNOSIS — M10.00 IDIOPATHIC GOUT, UNSPECIFIED CHRONICITY, UNSPECIFIED SITE: ICD-10-CM

## 2021-05-19 PROCEDURE — 99213 OFFICE O/P EST LOW 20 MIN: CPT | Performed by: INTERNAL MEDICINE

## 2021-05-19 PROCEDURE — 96372 THER/PROPH/DIAG INJ SC/IM: CPT | Performed by: INTERNAL MEDICINE

## 2021-05-19 RX ORDER — DICLOFENAC SODIUM 75 MG/1
75 TABLET, DELAYED RELEASE ORAL 2 TIMES DAILY
Qty: 60 TABLET | Refills: 0 | Status: SHIPPED | OUTPATIENT
Start: 2021-05-19 | End: 2021-06-11

## 2021-05-19 RX ORDER — METHOCARBAMOL 750 MG/1
750 TABLET, FILM COATED ORAL EVERY 6 HOURS PRN
Qty: 30 TABLET | Refills: 0 | Status: SHIPPED | OUTPATIENT
Start: 2021-05-19 | End: 2021-08-09

## 2021-05-19 NOTE — PROGRESS NOTES
Assessment/Plan:             1  Lumbar disc herniation with radiculopathy  -     methocarbamol (ROBAXIN) 750 mg tablet; Take 1 tablet (750 mg total) by mouth every 6 (six) hours as needed for muscle spasms  -     ketorolac (TORADOL) injection 30 mg    2  Mild intermittent asthma without complication    3  Idiopathic gout, unspecified chronicity, unspecified site           Subjective:      Patient ID: Francisca Carreon is a 46 y o  male  Right leg pain, thigh pain and lower leg pain, feels burning sensation, low back pain is better      The following portions of the patient's history were reviewed and updated as appropriate: He  has a past medical history of Asthma, Asthmatic bronchitis, Body mass index 34 0-34 9, adult, Convulsions ([address] ), Diverticulitis, Diverticulosis, Gastric ulcer, Gout, Left shoulder pain, Lumbar disc herniation with radiculopathy, Osteoarthritis, Pharyngitis, Pulmonary embolism ([address] ) (08/15/2012), Right knee pain, Rotator cuff syndrome of left shoulder, Routine general medical examination at a health care facility, SOB (shortness of breath), Spontaneous rupture of tendon of left shoulder, Syncope and collapse, and Vestibular neuritis  He   Patient Active Problem List    Diagnosis Date Noted    Lumbar disc herniation with radiculopathy     Gastroesophageal reflux disease without esophagitis 01/12/2021    Diverticulosis large intestine w/o perforation or abscess w/o bleeding 10/05/2020    Body mass index 36 0-36 9, adult 10/05/2020    Gout     Asthma     Diverticulitis      He  has a past surgical history that includes Sinus surgery; EGD AND COLONOSCOPY (01/31/2011); and Colon surgery (08/08/2012)  His family history includes Hyperlipidemia in his mother; Hypertension in his mother  He  reports that he has never smoked  He has never used smokeless tobacco  He reports previous alcohol use  No history on file for drug    Current Outpatient Medications   Medication Sig Dispense Refill    albuterol (PROVENTIL HFA,VENTOLIN HFA) 90 mcg/act inhaler Inhale 2 puffs every 6 (six) hours as needed for wheezing      allopurinol (ZYLOPRIM) 100 mg tablet Take 1 tablet (100 mg total) by mouth daily 30 tablet 3    diclofenac (VOLTAREN) 75 mg EC tablet Take 1 tablet (75 mg total) by mouth 2 (two) times a day 60 tablet 0    omeprazole (PriLOSEC) 20 mg delayed release capsule TAKE 1 CAPSULE (20 MG TOTAL) BY MOUTH DAILY BEFORE BREAKFAST 90 capsule 1    traMADol (ULTRAM) 50 mg tablet Take 1 tablet (50 mg total) by mouth every 8 (eight) hours as needed for moderate pain 20 tablet 0    colchicine (COLCRYS) 0 6 mg tablet Take 1 tablet (0 6 mg total) by mouth 2 (two) times a day (Patient not taking: Reported on 5/19/2021) 60 tablet 1    meclizine (ANTIVERT) 25 mg tablet Take 1 tablet (25 mg total) by mouth 3 (three) times a day as needed for dizziness (Patient not taking: Reported on 5/13/2021) 30 tablet 0    methocarbamol (ROBAXIN) 750 mg tablet Take 1 tablet (750 mg total) by mouth every 6 (six) hours as needed for muscle spasms 30 tablet 0     Current Facility-Administered Medications   Medication Dose Route Frequency Provider Last Rate Last Admin    ketorolac (TORADOL) injection 30 mg  30 mg Intramuscular Once Catalina Up MD         Current Outpatient Medications on File Prior to Visit   Medication Sig    albuterol (PROVENTIL HFA,VENTOLIN HFA) 90 mcg/act inhaler Inhale 2 puffs every 6 (six) hours as needed for wheezing    allopurinol (ZYLOPRIM) 100 mg tablet Take 1 tablet (100 mg total) by mouth daily    diclofenac (VOLTAREN) 75 mg EC tablet Take 1 tablet (75 mg total) by mouth 2 (two) times a day    omeprazole (PriLOSEC) 20 mg delayed release capsule TAKE 1 CAPSULE (20 MG TOTAL) BY MOUTH DAILY BEFORE BREAKFAST    traMADol (ULTRAM) 50 mg tablet Take 1 tablet (50 mg total) by mouth every 8 (eight) hours as needed for moderate pain    colchicine (COLCRYS) 0 6 mg tablet Take 1 tablet (0 6 mg total) by mouth 2 (two) times a day (Patient not taking: Reported on 5/19/2021)    meclizine (ANTIVERT) 25 mg tablet Take 1 tablet (25 mg total) by mouth 3 (three) times a day as needed for dizziness (Patient not taking: Reported on 5/13/2021)     No current facility-administered medications on file prior to visit  He is allergic to cefazolin; metronidazole; and levofloxacin       Review of Systems   Constitutional: Negative for chills and fever  HENT: Negative for congestion, ear pain and sore throat  Eyes: Negative for pain  Respiratory: Negative for cough and shortness of breath  Cardiovascular: Negative for chest pain and leg swelling  Gastrointestinal: Negative for abdominal pain, nausea and vomiting  Endocrine: Negative for polyuria  Genitourinary: Negative for dysuria, frequency and urgency  Musculoskeletal: Positive for back pain  Negative for arthralgias  Skin: Negative for rash  Neurological: Negative for weakness and headaches  Psychiatric/Behavioral: Negative for sleep disturbance  The patient is not nervous/anxious  Objective:      /82 (BP Location: Left arm, Patient Position: Standing, Cuff Size: Standard)   Pulse (!) 120   Temp 99 °F (37 2 °C) (Axillary)   Ht 6' 3" (1 905 m)   Wt 130 kg (286 lb)   SpO2 96%   BMI 35 75 kg/m²     No results found for this or any previous visit (from the past 1344 hour(s))  Physical Exam  Constitutional:       Appearance: Normal appearance  HENT:      Head: Normocephalic  Right Ear: External ear normal       Left Ear: External ear normal       Nose: No congestion  Mouth/Throat:      Pharynx: No oropharyngeal exudate or posterior oropharyngeal erythema  Eyes:      Extraocular Movements: Extraocular movements intact  Conjunctiva/sclera: Conjunctivae normal       Pupils: Pupils are equal, round, and reactive to light  Neck:      Musculoskeletal: Normal range of motion and neck supple     Cardiovascular: Rate and Rhythm: Normal rate and regular rhythm  Heart sounds: Normal heart sounds  No murmur  Pulmonary:      Effort: Pulmonary effort is normal       Breath sounds: Normal breath sounds  No wheezing or rales  Abdominal:      Palpations: Abdomen is soft  Musculoskeletal:      Right lower leg: No edema  Left lower leg: No edema  Comments: Right leg, movement painful at the hip area, walking difficulty due to pain, right thigh muscle spasm laterally   Lymphadenopathy:      Cervical: No cervical adenopathy  Skin:     General: Skin is warm  Neurological:      General: No focal deficit present  Mental Status: He is alert and oriented to person, place, and time

## 2021-05-20 ENCOUNTER — TELEPHONE (OUTPATIENT)
Dept: INTERNAL MEDICINE CLINIC | Facility: CLINIC | Age: 51
End: 2021-05-20

## 2021-05-20 NOTE — TELEPHONE ENCOUNTER
Spoke with pt he is not much better  He says he is still having pain in the back but not as much burning/pain in the leg, certain ways he walks he will get a very sharp pain in the back/leg so he has to be very careful, he says it is hard to say if he got any relief from the injection   I told him about the MRI being denied and the need for peer to peer  Pt says to call his wife Mylinda Kocher back [phone] because he might be on a few work phonecalls

## 2021-05-20 NOTE — TELEPHONE ENCOUNTER
Prior Auth was submitted and denied  Dr Jamaal Whitney did a Peer to Peer discussion and it was approved  Approval confirmation: [id number]   MRI Lumbar spine wo contrast     Appt   is scheduled for 5/21/21 @ 8:45am

## 2021-05-21 ENCOUNTER — APPOINTMENT (OUTPATIENT)
Dept: LAB | Age: 51
End: 2021-05-21
Payer: COMMERCIAL

## 2021-05-21 ENCOUNTER — HOSPITAL ENCOUNTER (OUTPATIENT)
Dept: MRI IMAGING | Facility: HOSPITAL | Age: 51
Discharge: HOME | End: 2021-05-21
Payer: COMMERCIAL

## 2021-05-21 ENCOUNTER — TRANSCRIBE ORDERS (OUTPATIENT)
Dept: ADMINISTRATIVE | Age: 51
End: 2021-05-21

## 2021-05-21 DIAGNOSIS — M51.16 LUMBAR DISC HERNIATION WITH RADICULOPATHY: ICD-10-CM

## 2021-05-21 DIAGNOSIS — M10.00 IDIOPATHIC GOUT, UNSPECIFIED CHRONICITY, UNSPECIFIED SITE: ICD-10-CM

## 2021-05-21 LAB
ALBUMIN SERPL BCP-MCNC: 3.7 G/DL (ref 3.5–5)
ALP SERPL-CCNC: 88 U/L (ref 46–116)
ALT SERPL W P-5'-P-CCNC: 52 U/L (ref 12–78)
ANION GAP SERPL CALCULATED.3IONS-SCNC: 6 MMOL/L (ref 4–13)
AST SERPL W P-5'-P-CCNC: 27 U/L (ref 5–45)
BASOPHILS # BLD AUTO: 0.04 THOUSANDS/ΜL (ref 0–0.1)
BASOPHILS NFR BLD AUTO: 0 % (ref 0–1)
BILIRUB SERPL-MCNC: 0.5 MG/DL (ref 0.2–1)
BUN SERPL-MCNC: 14 MG/DL (ref 5–25)
CALCIUM SERPL-MCNC: 9.2 MG/DL (ref 8.3–10.1)
CHLORIDE SERPL-SCNC: 109 MMOL/L (ref 100–108)
CHOLEST SERPL-MCNC: 243 MG/DL (ref 50–200)
CO2 SERPL-SCNC: 28 MMOL/L (ref 21–32)
CREAT SERPL-MCNC: 1.02 MG/DL (ref 0.6–1.3)
EOSINOPHIL # BLD AUTO: 0.37 THOUSAND/ΜL (ref 0–0.61)
EOSINOPHIL NFR BLD AUTO: 4 % (ref 0–6)
ERYTHROCYTE [DISTWIDTH] IN BLOOD BY AUTOMATED COUNT: 12.5 % (ref 11.6–15.1)
EST. AVERAGE GLUCOSE BLD GHB EST-MCNC: 123 MG/DL
GFR SERPL CREATININE-BSD FRML MDRD: 85 ML/MIN/1.73SQ M
GLUCOSE P FAST SERPL-MCNC: 91 MG/DL (ref 65–99)
HBA1C MFR BLD: 5.9 %
HCT VFR BLD AUTO: 47.7 % (ref 36.5–49.3)
HDLC SERPL-MCNC: 47 MG/DL
HGB BLD-MCNC: 15.7 G/DL (ref 12–17)
IMM GRANULOCYTES # BLD AUTO: 0.04 THOUSAND/UL (ref 0–0.2)
IMM GRANULOCYTES NFR BLD AUTO: 0 % (ref 0–2)
LDLC SERPL CALC-MCNC: 156 MG/DL (ref 0–100)
LYMPHOCYTES # BLD AUTO: 2.9 THOUSANDS/ΜL (ref 0.6–4.47)
LYMPHOCYTES NFR BLD AUTO: 31 % (ref 14–44)
MCH RBC QN AUTO: 29.5 PG (ref 26.8–34.3)
MCHC RBC AUTO-ENTMCNC: 32.9 G/DL (ref 31.4–37.4)
MCV RBC AUTO: 90 FL (ref 82–98)
MONOCYTES # BLD AUTO: 0.81 THOUSAND/ΜL (ref 0.17–1.22)
MONOCYTES NFR BLD AUTO: 9 % (ref 4–12)
NEUTROPHILS # BLD AUTO: 5.1 THOUSANDS/ΜL (ref 1.85–7.62)
NEUTS SEG NFR BLD AUTO: 56 % (ref 43–75)
NRBC BLD AUTO-RTO: 0 /100 WBCS
PLATELET # BLD AUTO: 263 THOUSANDS/UL (ref 149–390)
PMV BLD AUTO: 9 FL (ref 8.9–12.7)
POTASSIUM SERPL-SCNC: 4.2 MMOL/L (ref 3.5–5.3)
PROT SERPL-MCNC: 7.5 G/DL (ref 6.4–8.2)
RBC # BLD AUTO: 5.32 MILLION/UL (ref 3.88–5.62)
SODIUM SERPL-SCNC: 143 MMOL/L (ref 136–145)
TRIGL SERPL-MCNC: 198 MG/DL
TSH SERPL DL<=0.05 MIU/L-ACNC: 3.43 UIU/ML (ref 0.36–3.74)
URATE SERPL-MCNC: 8.5 MG/DL (ref 4.2–8)
WBC # BLD AUTO: 9.26 THOUSAND/UL (ref 4.31–10.16)

## 2021-05-21 PROCEDURE — 72148 MRI LUMBAR SPINE W/O DYE: CPT

## 2021-05-21 PROCEDURE — 36415 COLL VENOUS BLD VENIPUNCTURE: CPT

## 2021-05-21 PROCEDURE — 84443 ASSAY THYROID STIM HORMONE: CPT

## 2021-05-21 PROCEDURE — 85025 COMPLETE CBC W/AUTO DIFF WBC: CPT

## 2021-05-21 PROCEDURE — G1004 CDSM NDSC: HCPCS

## 2021-05-21 PROCEDURE — 80053 COMPREHEN METABOLIC PANEL: CPT

## 2021-05-21 PROCEDURE — 84550 ASSAY OF BLOOD/URIC ACID: CPT

## 2021-05-21 PROCEDURE — 83036 HEMOGLOBIN GLYCOSYLATED A1C: CPT

## 2021-05-21 PROCEDURE — 80061 LIPID PANEL: CPT

## 2021-05-21 RX ORDER — KETOROLAC TROMETHAMINE 30 MG/ML
30 INJECTION, SOLUTION INTRAMUSCULAR; INTRAVENOUS ONCE
Status: COMPLETED | OUTPATIENT
Start: 2021-05-21 | End: 2021-05-21

## 2021-05-21 RX ORDER — COLCHICINE 0.6 MG/1
0.6 TABLET ORAL 2 TIMES DAILY
Qty: 60 TABLET | Refills: 0 | Status: SHIPPED | OUTPATIENT
Start: 2021-05-21 | End: 2021-06-28

## 2021-05-21 RX ORDER — ALLOPURINOL 100 MG/1
100 TABLET ORAL DAILY
Qty: 30 TABLET | Refills: 3 | Status: SHIPPED | OUTPATIENT
Start: 2021-05-21 | End: 2022-06-06 | Stop reason: SDUPTHER

## 2021-05-21 RX ADMIN — KETOROLAC TROMETHAMINE 30 MG: 30 INJECTION, SOLUTION INTRAMUSCULAR; INTRAVENOUS at 15:08

## 2021-05-27 DIAGNOSIS — M51.16 LUMBAR DISC HERNIATION WITH RADICULOPATHY: Primary | ICD-10-CM

## 2021-05-28 ENCOUNTER — TELEPHONE (OUTPATIENT)
Dept: PAIN MEDICINE | Facility: CLINIC | Age: 51
End: 2021-05-28

## 2021-05-28 ENCOUNTER — TELEPHONE (OUTPATIENT)
Dept: INTERNAL MEDICINE CLINIC | Facility: CLINIC | Age: 51
End: 2021-05-28

## 2021-05-28 DIAGNOSIS — M51.16 LUMBAR DISC HERNIATION WITH RADICULOPATHY: Primary | ICD-10-CM

## 2021-05-28 NOTE — TELEPHONE ENCOUNTER
Patient called requesting to schedule his injection  He has a direct referral from his PCP Dr Finch Has in [address] [place] [address]  His consultation is scheduled on 6/14/21   Please advise, moody    Call back# [phone]

## 2021-05-28 NOTE — TELEPHONE ENCOUNTER
Pt says you are sending him for epidural and pain management is putting him in for an appt 6/14/21   Pt is under impression that it was for him to get the epidural today and if this is the case their office is saying that your referral need to say that because referral in system is for a consultation

## 2021-05-28 NOTE — TELEPHONE ENCOUNTER
S/w the patient and he stated he has his MRI and as per Сергей Antonio he would like to be scheduled for a epidural  He stated that the office made a mistake and he is scheduled for an consult instead of an injection  Reviewed that John Julianlos is out of the office until 6/1  and that he could review his records upon his return but that he might need an office visit so that John Sauer can evaluate him himself  He denies taking anticoagulants or Antibx currently and had booster 4 weeks ago  He verbalized understanding and will await your response

## 2021-06-02 NOTE — TELEPHONE ENCOUNTER
Pt was returning a call from someone    Per notes he wanted to be seen sooner  Pt took available appt for tomorrow 7:30a

## 2021-06-02 NOTE — TELEPHONE ENCOUNTER
S/w the patient and reviewed  He would like to be seen earlier if possible  He is scheduled for 6/14  If there is a cancellation, please contact   thanks

## 2021-06-03 ENCOUNTER — CONSULT (OUTPATIENT)
Dept: PAIN MEDICINE | Facility: CLINIC | Age: 51
End: 2021-06-03
Payer: COMMERCIAL

## 2021-06-03 VITALS
SYSTOLIC BLOOD PRESSURE: 145 MMHG | WEIGHT: 298 LBS | HEIGHT: 75 IN | TEMPERATURE: 98.6 F | HEART RATE: 98 BPM | BODY MASS INDEX: 37.05 KG/M2 | DIASTOLIC BLOOD PRESSURE: 89 MMHG

## 2021-06-03 DIAGNOSIS — M47.816 LUMBAR SPONDYLOSIS: ICD-10-CM

## 2021-06-03 DIAGNOSIS — M51.16 LUMBAR DISC HERNIATION WITH RADICULOPATHY: Primary | ICD-10-CM

## 2021-06-03 PROCEDURE — 3008F BODY MASS INDEX DOCD: CPT | Performed by: ANESTHESIOLOGY

## 2021-06-03 PROCEDURE — 99204 OFFICE O/P NEW MOD 45 MIN: CPT | Performed by: ANESTHESIOLOGY

## 2021-06-03 PROCEDURE — 1036F TOBACCO NON-USER: CPT | Performed by: ANESTHESIOLOGY

## 2021-06-03 RX ORDER — GABAPENTIN 300 MG/1
300 CAPSULE ORAL 3 TIMES DAILY
Qty: 90 CAPSULE | Refills: 1 | Status: SHIPPED | OUTPATIENT
Start: 2021-06-03 | End: 2021-08-09

## 2021-06-03 NOTE — PATIENT INSTRUCTIONS
Epidural Steroid Injection   WHAT YOU NEED TO KNOW:   What do I need to know about an epidural steroid injection (THEODORE)? An THEODORE is a procedure to inject steroid medicine into the epidural space  The epidural space is between your spinal cord and vertebrae  Steroids reduce inflammation and fluid buildup in your spine that may be causing pain  You may be given pain medicine along with the steroids  How do I prepare for an THEODORE? Your healthcare provider will talk to you about how to prepare for your procedure  He or she will tell you what medicines to take or not take on the day of your procedure  You may need to stop taking blood thinners or other medicines several days before your procedure  You may need to adjust any diabetes medicine you take on the day of your procedure  Steroid medicine can increase your blood sugar level  Arrange for someone to drive you home when you are discharged  What will happen during an THEODORE? · You will be given medicine to numb the procedure area  You will be awake for the procedure, but you will not feel pain  You may also be given medicine to help you relax  Contrast liquid will be used to help your healthcare provider see the area better  Tell the healthcare provider if you have ever had an allergic reaction to contrast liquid  · Your healthcare provider may place the needle into your neck area, middle of your back, or tailbone area  He may inject the medicine next to the nerves that are causing your pain  He may instead inject the medicine into a larger area of the epidural space  This helps the medicine spread to more nerves  Your healthcare provider will use a fluoroscope to help guide the needle to the right place  A fluoroscope is a type of x-ray  After the procedure, a bandage will be placed over the injection site to prevent infection  What will happen after an THEODORE? You will have a bandage over the injection site to prevent infection   Your healthcare provider will tell you when you can bathe and any activity guidelines  You will be able to go home  What are the risks of an THEODORE? You may have temporary or permanent nerve damage or paralysis  You may have bleeding or develop a serious infection, such as meningitis (swelling of the brain coverings)  An abscess may also develop  An abscess is a pus-filled area under the skin  You may need surgery to fix the abscess  You may have a seizure, anxiety, or trouble sleeping  If you are a man, you may have temporary erectile dysfunction (not able to have an erection)  CARE AGREEMENT:   You have the right to help plan your care  Learn about your health condition and how it may be treated  Discuss treatment options with your healthcare providers to decide what care you want to receive  You always have the right to refuse treatment  The above information is an  only  It is not intended as medical advice for individual conditions or treatments  Talk to your doctor, nurse or pharmacist before following any medical regimen to see if it is safe and effective for you  © Copyright [address] Information is for End User's use only and may not be sold, redistributed or otherwise used for commercial purposes   All illustrations and images included in CareNotes® are the copyrighted property of [employer]  or [address] [place] [address] [employer]

## 2021-06-03 NOTE — PROGRESS NOTES
Assessment  1  Lumbar disc herniation with radiculopathy    2  Lumbar spondylosis        Plan  66-year-old male referred by Dr Carlton Fuller, presenting for initial consultation regarding right-sided lumbosacral back pain radiating into the right buttock and lateral aspect of the right thigh with associated subjective weakness  He denies any specific trauma or inciting event  He has been having waxing and waning symptoms for many years, but this recent flare has been the most severe  MRI of the lumbar spine reveals right-sided disc herniation at L4-5 extending into the foramen and contacting the L4 nerve root  There is also some recess stenosis on the right as well with potential inmpact on the right L5 nerve root  Smaller central disc protrusion at L5-S1  Facet arthrosis also noted  The patient has tried oral steroids, chiropractic treatment, NSAIDs, Tylenol, and tramadol without any significant relief  Physical therapy has been ordered and he starts this in the near future  Patient's symptoms are consistent with lumbar radiculopathy stemming from L4-5 disc herniation  Fortunately reflexes and strength are preserved  1  I will schedule the patient for right L4 and L5 TFESI to reduce the inflammatory component of his pain  2  I will initiate gabapentin 300 mg q h s  and he will titrate up to t i d  for his neuropathic complaints  The patient was apprised the most common side effects of gabapentin and he was given a titration schedule at today's office visit  3  Patient will proceed with physical therapy as I feel he would benefit from [facility] based exercises  4  I will follow up the patient in 4-6 weeks after injection       Complete risks and benefits including bleeding, infection, tissue reaction, nerve injury and allergic reaction were discussed  The approach was demonstrated using models and literature was provided  Verbal and written consent was obtained      My impressions and treatment recommendations were discussed in detail with the patient who verbalized understanding and had no further questions  Discharge instructions were provided  I personally saw and examined the patient and I agree with the above discussed plan of care  No orders of the defined types were placed in this encounter  No orders of the defined types were placed in this encounter  History of Present Illness    Lulu Darby is a 46 y o  male referred by Dr Nj Stover, presenting for initial consultation regarding right-sided lumbosacral back pain radiating into the right buttock and lateral aspect of the right thigh with associated subjective weakness  He denies any specific trauma or inciting event  He has been having waxing and waning symptoms for many years, but this recent flare has been the most severe  He denies any left lower extremity symptoms, bladder bowel incontinence, or saddle anesthesia  MRI of the lumbar spine reveals right-sided disc herniation at L4-5 extending into the foramen and contacting the L4 nerve root  There is also some recess stenosis on the right as well with potential inmpact on the right L5 nerve root  Smaller central disc protrusion at L5-S1  Facet arthrosis also noted  The patient has tried oral steroids, chiropractic treatment, NSAIDs, Tylenol, and tramadol without any significant relief  Physical therapy has been ordered and he starts this in the near future  The patient rates his pain a 310/10 depending upon activity  The pain is constant and does not follow any particular pattern throughout the day  The pain is described as sharp, pressure-like, throbbing, dull, and aching  The pain is increased with standing, bending, sitting, walking, and exercise  He does find some relief with heat and ice application      Other than as stated above, the patient denies any interval changes in medications, medical condition, mental condition, symptoms, or allergies since the last office visit           I have personally reviewed and/or updated the patient's past medical history, past surgical history, family history, social history, current medications, allergies, and vital signs today  Review of Systems   Constitutional: Positive for unexpected weight change  Negative for fever  HENT: Negative for trouble swallowing  Eyes: Negative for visual disturbance  Respiratory: Negative for shortness of breath and wheezing  Cardiovascular: Negative for chest pain and palpitations  Gastrointestinal: Negative for constipation, diarrhea, nausea and vomiting  Endocrine: Negative for cold intolerance, heat intolerance and polydipsia  Genitourinary: Negative for difficulty urinating and frequency  Musculoskeletal: Positive for joint swelling and myalgias  Negative for arthralgias and gait problem  Skin: Negative for rash  Neurological: Negative for dizziness, seizures, syncope, weakness and headaches  Hematological: Does not bruise/bleed easily  Psychiatric/Behavioral: Negative for dysphoric mood  All other systems reviewed and are negative        Patient Active Problem List   Diagnosis    Gout    Asthma    Diverticulitis    Diverticulosis large intestine w/o perforation or abscess w/o bleeding    Body mass index 36 0-36 9, adult    Gastroesophageal reflux disease without esophagitis    Lumbar disc herniation with radiculopathy       Past Medical History:   Diagnosis Date    Asthma     Asthmatic bronchitis     Body mass index 34 0-34 9, adult     Convulsions ([address] )     Diverticulitis     Diverticulosis     sigmoid colon - on colonoscopy 1/31/2011     Gastric ulcer     Gout     Left shoulder pain     Lumbar disc herniation with radiculopathy     Osteoarthritis     Pharyngitis     Pulmonary embolism ([address] ) 08/15/2012    Right knee pain     Rotator cuff syndrome of left shoulder     Routine general medical examination at a health care facility     SOB (shortness of breath)     Spontaneous rupture of tendon of left shoulder     Syncope and collapse     Vestibular neuritis        Past Surgical History:   Procedure Laterality Date    COLON SURGERY  08/08/2012    Sigmoid colon removed for diverticulosis     EGD AND COLONOSCOPY  01/31/2011    Dr Saldana Stands History   Problem Relation Age of Onset    Hypertension Mother     Hyperlipidemia Mother        Social History     Occupational History    Not on file   Tobacco Use    Smoking status: Never Smoker    Smokeless tobacco: Never Used   Substance and Sexual Activity    Alcohol use: Not Currently    Drug use: Not on file    Sexual activity: Not on file       Current Outpatient Medications on File Prior to Visit   Medication Sig    albuterol (PROVENTIL HFA,VENTOLIN HFA) 90 mcg/act inhaler Inhale 2 puffs every 6 (six) hours as needed for wheezing    allopurinol (ZYLOPRIM) 100 mg tablet Take 1 tablet (100 mg total) by mouth daily    colchicine (COLCRYS) 0 6 mg tablet Take 1 tablet (0 6 mg total) by mouth 2 (two) times a day    diclofenac (VOLTAREN) 75 mg EC tablet Take 1 tablet (75 mg total) by mouth 2 (two) times a day    meclizine (ANTIVERT) 25 mg tablet Take 1 tablet (25 mg total) by mouth 3 (three) times a day as needed for dizziness (Patient not taking: Reported on 5/13/2021)    methocarbamol (ROBAXIN) 750 mg tablet Take 1 tablet (750 mg total) by mouth every 6 (six) hours as needed for muscle spasms    omeprazole (PriLOSEC) 20 mg delayed release capsule TAKE 1 CAPSULE (20 MG TOTAL) BY MOUTH DAILY BEFORE BREAKFAST    traMADol (ULTRAM) 50 mg tablet Take 1 tablet (50 mg total) by mouth every 8 (eight) hours as needed for moderate pain     No current facility-administered medications on file prior to visit          Allergies   Allergen Reactions    Cefazolin Hives    Metronidazole Hives    Levofloxacin Palpitations       Physical Exam    There were no vitals taken for this visit  Constitutional: normal, well developed, well nourished, alert, in no distress and non-toxic and no overt pain behavior  Eyes: anicteric  HEENT: grossly intact  Neck: supple, symmetric, trachea midline and no masses   Pulmonary:even and unlabored  Cardiovascular:No edema or pitting edema present  Skin:Normal without rashes or lesions and well hydrated  Psychiatric:Mood and affect appropriate  Neurologic:Cranial Nerves II-XII grossly intact  Musculoskeletal:antalgicGait  Bilateral lumbar paraspinals tender to palpation more so on the right than left from L4-L5  Bilateral patellar and Achilles reflexes were 2/4 and symmetrical   No clonus was noted bilaterally  Bilateral lower extremity strength 5/5 in all muscle groups  Sensation intact to light touch in L3 through S1 dermatomes bilaterally  Positive straight leg raise on the right and negative on the left  Negative Mac's test bilaterally  Imaging      Study Result    MRI LUMBAR SPINE WITHOUT CONTRAST     INDICATION: M51 16: Intervertebral disc disorders with radiculopathy, lumbar region  Dysesthesia right leg     COMPARISON:  None      TECHNIQUE:  Sagittal T1, sagittal T2, sagittal inversion recovery, axial T1 and axial T2, coronal T2     IMAGE QUALITY:  Diagnostic     FINDINGS:     VERTEBRAL BODIES:  There are 5 nonrib-bearing lumbar type vertebral bodies  Straightening of normal lumbar lordosis  Normal marrow signal is identified within the visualized bony structures  No discrete marrow lesion      SACRUM:  Normal signal within the sacrum  No evidence of insufficiency or stress fracture      DISTAL CORD AND CONUS:  Normal size and signal within the distal cord and conus    Conus terminates at the T12-L1 level      PARASPINAL SOFT TISSUES:  Paraspinal soft tissues are unremarkable      LOWER THORACIC DISC SPACES:  Normal disc height and signal   No disc herniation, canal stenosis or foraminal narrowing      LUMBAR DISC SPACES:     L1-L2: Minor facet arthrosis slight desiccation of the disc     L2-L3:  Normal      L3-L4:  Minor desiccation, slight bulge      L4-L5:  Circumferential bulging of the disc asymmetrically extending into the right foramen  Disc is in contact with post foraminal L4 root  Correlate for right L4 radiculitis  There is minor narrowing of the right lateral recess with potential impact   right L5 root  Correlate for right L5 radiculitis      L5-S1:  Minor facet arthrosis minimal bulge      IMPRESSION:     Right posterolateral disc osteophyte at the L4-L5 with potential impact on the right L4 and L5 nerve roots    Correlate for candidate for right L4-L5 transforaminal nerve block         Workstation performed: [id number]

## 2021-06-04 ENCOUNTER — EVALUATION (OUTPATIENT)
Dept: PHYSICAL THERAPY | Facility: REHABILITATION | Age: 51
End: 2021-06-04
Payer: COMMERCIAL

## 2021-06-04 DIAGNOSIS — M51.26 DISC DISPLACEMENT, LUMBAR: ICD-10-CM

## 2021-06-04 DIAGNOSIS — M51.16 LUMBAR DISC HERNIATION WITH RADICULOPATHY: Primary | ICD-10-CM

## 2021-06-04 PROCEDURE — 97161 PT EVAL LOW COMPLEX 20 MIN: CPT

## 2021-06-04 PROCEDURE — 97110 THERAPEUTIC EXERCISES: CPT

## 2021-06-04 NOTE — PROGRESS NOTES
PT Evaluation     Today's date: 2021  Patient name: Tanya Trevizo  : 1970  MRN: [id number]  Referring provider: Norma Orlando MD  Dx:   Encounter Diagnosis     ICD-10-CM    1  Lumbar disc herniation with radiculopathy  M51 16    2  Disc displacement, lumbar  M51 26 Ambulatory referral to Physical Therapy       Start Time: 0845  Stop Time: 930  Total time in clinic (min): 45 minutes    Assessment  Assessment details: Patients with signs and symptoms consistent with referring diagnosis of lumbar radiculopathy  Patient presents with myotomal weakness of the right tib anterior but otherwise normal neurology  Additionally, he presents with adverse neural dynamics in the R LE including a crossed straight leg raise on the Left  Impairments include low back pain, limited lumbar and hip ROM, and poor load tolerance of the lumbar spine  Patient responded favorably to flexion/opening based movements including repeated lumbar flexion in seated and side lying right rotation with centralization of distal symptoms and substantial relief of low back pain  The patient is scheduled for a TFCSI next Tuesday  Patients myotomal weakness and lumbar exam will be re-evaluated post injection next week  Patient would benefit from physical therapy to address his impairments and to assist in his return to PLOF  Impairments: activity intolerance and weight-bearing intolerance  Functional limitations: difficulty walking, standing, working, coaching  Understanding of Dx/Px/POC: excellent   Prognosis: good  Prognosis details: In-session response with reduction in symptoms post session    Goals  STG:   Patient independent with initial HEP    Patient with 50% reduction in leg symptoms in 4 weeks   Patient able to walk for 20 minutes without an increase in radicular symptoms 4 weeks   Increase lumbar extension ROM by 25%     LTG:   Increase lumbar extension ROM by 75% 8 weeks   Patient able to walk for 40 mins without symptom increase 10 weeks   Patient able to return to regular exercise routine at discharge  Plan  Planned therapy interventions: manual therapy, joint mobilization, neuromuscular re-education, patient education, therapeutic activities, therapeutic exercise, therapeutic training, graded exercise and functional ROM exercises  Frequency: 2x week  Duration in weeks: 12        Subjective Evaluation    History of Present Illness  Date of onset: 2021  Mechanism of injury: Patient reports onset of low back pain with radiating symptoms into his left lateral thigh, gluteal fold, and low back  Patient symptoms started while brushing his teeth  Patient reports hurting his back several decades ago and has periodic episode of back pain but he has not has back and leg pain this bag before  Patient reports he has his son and wife pull on his legs which gives him some relief  He notes walking and standing are worse than sitting  He notes brief periods of high intensity sharp pain in his low back that will improve back to a baseline dull ache  Patient works a [employer], 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[employer], and likes to walk for exercise  Patient is scheduled for a transforminal injection  Pain  Current pain ratin  At best pain ratin  At worst pain rating: 10  Quality: dull ache and burning  Aggravating factors: walking      Diagnostic Tests  MRI studies: abnormal  Treatments  Current treatment: chiropractic, injection treatment and medication  Patient Goals  Patient goals for therapy: independence with ADLs/IADLs, increased strength, return to work and return to sport/leisure activities  Patient goal: work without difficulty           Objective     Neurological Testing     Sensation     Lumbar   Left   Intact: light touch    Right   Intact: light touch    Reflexes   Left   Patellar (L4): normal (2+)  Achilles (S1): trace (1+)    Right   Patellar (L4): normal (2+)  Achilles (S1): trace (1+)    Active Range of Motion     Lumbar Flexion:  Restriction level: moderate  Extension:  Restriction level: maximal    Additional Active Range of Motion Details  Patient with centralization of leg symptoms with repeated flexion in seated and with sidelying rotation for right side gapping  Patient unable to tolerate NOY     Strength/Myotome Testing     Lumbar   Left   Heel walk: normal  Toe walk: normal    Right   Heel walk: abnormal  Toe walk: normal    Left Ankle/Foot   Dorsiflexion: 5  Plantar flexion: 5  Great toe extension: 5    Right Ankle/Foot   Dorsiflexion: 4-  Plantar flexion: 5  Great toe extension: 5    Additional Strength Details  Patient with difficulty heel walking and dorsiflexion on right is notably weak against therapist resistances    Tests     Lumbar     Left   Positive crossed SLR  Negative passive SLR and slump test      Right   Positive passive SLR and slump test    Negative crossed SLR                Precautions: myotomal weakness L4       Manuals             Lumbar mobilization                                                    Neuro Re-Ed                                                                                                        Ther Ex             sidelying lumbar rotation (R) (HEP) 10 x 5"             Repeated lumbar flexion (HEP)  X 10                                                                                           Ther Activity                                       Gait Training                                       Modalities

## 2021-06-08 ENCOUNTER — HOSPITAL ENCOUNTER (OUTPATIENT)
Dept: RADIOLOGY | Facility: CLINIC | Age: 51
Discharge: HOME | End: 2021-06-08
Attending: ANESTHESIOLOGY | Admitting: ANESTHESIOLOGY
Payer: COMMERCIAL

## 2021-06-08 VITALS
SYSTOLIC BLOOD PRESSURE: 124 MMHG | HEART RATE: 102 BPM | OXYGEN SATURATION: 97 % | TEMPERATURE: 98.2 F | DIASTOLIC BLOOD PRESSURE: 82 MMHG | RESPIRATION RATE: 20 BRPM

## 2021-06-08 DIAGNOSIS — M51.16 LUMBAR DISC HERNIATION WITH RADICULOPATHY: ICD-10-CM

## 2021-06-08 PROCEDURE — 64484 NJX AA&/STRD TFRM EPI L/S EA: CPT | Performed by: ANESTHESIOLOGY

## 2021-06-08 PROCEDURE — 64483 NJX AA&/STRD TFRM EPI L/S 1: CPT | Performed by: ANESTHESIOLOGY

## 2021-06-08 RX ORDER — PAPAVERINE HCL 150 MG
20 CAPSULE, EXTENDED RELEASE ORAL ONCE
Status: COMPLETED | OUTPATIENT
Start: 2021-06-08 | End: 2021-06-08

## 2021-06-08 RX ADMIN — IOHEXOL 1 ML: 300 INJECTION, SOLUTION INTRAVENOUS at 11:34

## 2021-06-08 RX ADMIN — LIDOCAINE HYDROCHLORIDE 2 ML: 20 INJECTION, SOLUTION EPIDURAL; INFILTRATION; INTRACAUDAL; PERINEURAL at 11:34

## 2021-06-08 RX ADMIN — DEXAMETHASONE SODIUM PHOSPHATE 20 MG: 10 INJECTION, SOLUTION INTRAMUSCULAR; INTRAVENOUS at 11:35

## 2021-06-08 NOTE — H&P
History of Present Illness: The patient is a 46 y o  male who presents with complaints of   Low back and leg pain      Patient Active Problem List   Diagnosis    Gout    Asthma    Diverticulitis    Diverticulosis large intestine w/o perforation or abscess w/o bleeding    Body mass index 36 0-36 9, adult    Gastroesophageal reflux disease without esophagitis    Lumbar disc herniation with radiculopathy    Lumbar spondylosis       Past Medical History:   Diagnosis Date    Asthma     Asthmatic bronchitis     Body mass index 34 0-34 9, adult     Convulsions ([address] )     Diverticulitis     Diverticulosis     sigmoid colon - on colonoscopy 1/31/2011     Gastric ulcer     Gout     Left shoulder pain     Lumbar disc herniation with radiculopathy     Osteoarthritis     Pharyngitis     Pulmonary embolism ([facility] [address] ) 08/15/2012    Right knee pain     Rotator cuff syndrome of left shoulder     Routine general medical examination at a health care facility     SOB (shortness of breath)     Spontaneous rupture of tendon of left shoulder     Syncope and collapse     Vestibular neuritis        Past Surgical History:   Procedure Laterality Date    COLON SURGERY  08/08/2012    Sigmoid colon removed for diverticulosis     EGD AND COLONOSCOPY  01/31/2011    Dr Jerry Rodas           Current Outpatient Medications:     albuterol (PROVENTIL HFA,VENTOLIN HFA) 90 mcg/act inhaler, Inhale 2 puffs every 6 (six) hours as needed for wheezing, Disp: , Rfl:     allopurinol (ZYLOPRIM) 100 mg tablet, Take 1 tablet (100 mg total) by mouth daily, Disp: 30 tablet, Rfl: 3    colchicine (COLCRYS) 0 6 mg tablet, Take 1 tablet (0 6 mg total) by mouth 2 (two) times a day, Disp: 60 tablet, Rfl: 0    diclofenac (VOLTAREN) 75 mg EC tablet, Take 1 tablet (75 mg total) by mouth 2 (two) times a day (Patient not taking: Reported on 6/3/2021), Disp: 60 tablet, Rfl: 0    gabapentin (NEURONTIN) 300 mg capsule, Take 1 capsule (300 mg total) by mouth 3 (three) times a day, Disp: 90 capsule, Rfl: 1    meclizine (ANTIVERT) 25 mg tablet, Take 1 tablet (25 mg total) by mouth 3 (three) times a day as needed for dizziness (Patient not taking: Reported on 5/13/2021), Disp: 30 tablet, Rfl: 0    methocarbamol (ROBAXIN) 750 mg tablet, Take 1 tablet (750 mg total) by mouth every 6 (six) hours as needed for muscle spasms (Patient not taking: Reported on 6/3/2021), Disp: 30 tablet, Rfl: 0    omeprazole (PriLOSEC) 20 mg delayed release capsule, TAKE 1 CAPSULE (20 MG TOTAL) BY MOUTH DAILY BEFORE BREAKFAST (Patient not taking: Reported on 6/3/2021), Disp: 90 capsule, Rfl: 1    traMADol (ULTRAM) 50 mg tablet, Take 1 tablet (50 mg total) by mouth every 8 (eight) hours as needed for moderate pain (Patient not taking: Reported on 6/3/2021), Disp: 20 tablet, Rfl: 0    Current Facility-Administered Medications:     dexamethasone (PF) (DECADRON) injection 20 mg, 20 mg, Epidural, Once, Alyse Earl, DO    iohexol (OMNIPAQUE) 300 mg/mL injection 50 mL, 50 mL, Epidural, Once, Mansoor Earl, DO    lidocaine (PF) (XYLOCAINE-MPF) 2 % injection 2 mL, 2 mL, Epidural, Once, Mansoor Earl, DO    Allergies   Allergen Reactions    Cefazolin Hives    Metronidazole Hives    Levofloxacin Palpitations       Physical Exam:   Vitals:    06/08/21 1115   BP: 150/76   Pulse: (!) 110   Resp: 20   Temp: 98 2 °F (36 8 °C)   SpO2: 97%     General: Awake, Alert, Oriented x 3, Mood and affect appropriate  Respiratory: Respirations even and unlabored  Cardiovascular: Peripheral pulses intact; no edema  Musculoskeletal Exam:   Right lumbar paraspinals tender to palpation  ASA Score: 2    Patient/Chart Verification  Patient ID Verified: Verbal  ID Band Applied: No  Consents Confirmed: Procedural, To be obtained in the Pre-Procedure area  H&P( within 30 days) Verified: To be obtained in the Pre-Procedure area  Allergies Reviewed:  Yes  Anticoag/NSAID held?: NA  Currently on antibiotics?: No    Assessment:   1   Lumbar disc herniation with radiculopathy        Plan: Right L4 and L5 TFESI

## 2021-06-08 NOTE — DISCHARGE INSTRUCTIONS
Epidural Steroid Injection   WHAT YOU NEED TO KNOW:   An epidural steroid injection (THEODORE) is a procedure to inject steroid medicine into the epidural space  The epidural space is between your spinal cord and vertebrae  Steroids reduce inflammation and fluid buildup in your spine that may be causing pain  You may be given pain medicine along with the steroids  ACTIVITY  · Do not drive or operate machinery today  · No strenuous activity today - bending, lifting, etc   · You may resume normal activites starting tomorrow - start slowly and as tolerated  · You may shower today, but no tub baths or hot tubs  · You may have numbness for several hours from the local anesthetic  Please use caution and common sense, especially with weight-bearing activities  CARE OF THE INJECTION SITE  · If you have soreness or pain, apply ice to the area today (20 minutes on/20 minutes off)  · Starting tomorrow, you may use warm, moist heat or ice if needed  · You may have an increase or change in your discomfort for 36-48 hours after your treatment  · Apply ice and continue with any pain medication you have been prescribed  · Notify [facility] if you have any of the following: redness, drainage, swelling, headache, stiff neck or fever above 100°F     SPECIAL INSTRUCTIONS  · Our office will contact you in approximately 7 days for a progress report  MEDICATIONS  · Continue to take all routine medications  · Our office may have instructed you to hold some medications  As no general anesthesia was used in today's procedure, you should not experience any side effects related to anesthesia  If you have a problem specifically related to your procedure, please call our office at [phone]  Problems not related to your procedure should be directed to your primary care physician

## 2021-06-11 ENCOUNTER — OFFICE VISIT (OUTPATIENT)
Dept: PHYSICAL THERAPY | Facility: REHABILITATION | Age: 51
End: 2021-06-11
Payer: COMMERCIAL

## 2021-06-11 DIAGNOSIS — M51.26 DISC DISPLACEMENT, LUMBAR: Primary | ICD-10-CM

## 2021-06-11 DIAGNOSIS — M51.16 LUMBAR DISC HERNIATION WITH RADICULOPATHY: ICD-10-CM

## 2021-06-11 DIAGNOSIS — M25.562 ACUTE PAIN OF LEFT KNEE: ICD-10-CM

## 2021-06-11 PROCEDURE — 97140 MANUAL THERAPY 1/> REGIONS: CPT | Performed by: PHYSICAL THERAPIST

## 2021-06-11 PROCEDURE — 97110 THERAPEUTIC EXERCISES: CPT | Performed by: PHYSICAL THERAPIST

## 2021-06-11 RX ORDER — DICLOFENAC SODIUM 75 MG/1
TABLET, DELAYED RELEASE ORAL
Qty: 60 TABLET | Refills: 0 | Status: SHIPPED | OUTPATIENT
Start: 2021-06-11 | End: 2022-01-25 | Stop reason: SDUPTHER

## 2021-06-11 NOTE — PROGRESS NOTES
Daily Note     Today's date: 2021  Patient name: Pennie Hoover  : 1970  MRN: [id number]  Referring provider: Shaunna Moore MD  Dx:   Encounter Diagnosis     ICD-10-CM    1  Disc displacement, lumbar  M51 26    2  Lumbar disc herniation with radiculopathy  M51 16                   Subjective: The back has been a bit better since last week  I had my injections last week, and I have noticed less pain and less sciatic symptoms  I still notice some pulling sensation and discomfort in my right buttock  Objective: See treatment diary below      Assessment: Tolerated first treatment well today  Did need to review HEP with patient today particularly the sidelying rotational exercise as he had difficulty setting this up at home  Was able to replicate once assistance was provided by PT  Does have very poor spinal dissociation between his spine and pelvis, and global poor mobility of his lumbar spine and hips as well  Was given progressed HEP today to address his mobility deficits  Would continue to benefit from skilled PT  Plan: Continue per plan of care        Precautions: myotomal weakness L4       Manuals             Lumbar mobilization  SE 8' Gr III                                                   Neuro Re-Ed                                                                                                        Ther Ex             sidelying lumbar rotation (R) (HEP) 10 x 5"  2x10 5"            Repeated lumbar flexion (HEP)  X 10  2x10            Nustep   10' L4            Cat Cow   20x5"            Posterior Hip Rocking Quadruped   20x5"            Seated Thoracic Rotation   20x5" B           Lumbar Physioball Rollouts   20x5"                         Ther Activity                                       Gait Training                                       Modalities

## 2021-06-15 ENCOUNTER — TELEPHONE (OUTPATIENT)
Dept: PAIN MEDICINE | Facility: CLINIC | Age: 51
End: 2021-06-15

## 2021-06-17 ENCOUNTER — OFFICE VISIT (OUTPATIENT)
Dept: PHYSICAL THERAPY | Facility: REHABILITATION | Age: 51
End: 2021-06-17
Payer: COMMERCIAL

## 2021-06-17 DIAGNOSIS — M51.16 LUMBAR DISC HERNIATION WITH RADICULOPATHY: ICD-10-CM

## 2021-06-17 DIAGNOSIS — M51.26 DISC DISPLACEMENT, LUMBAR: Primary | ICD-10-CM

## 2021-06-17 DIAGNOSIS — M10.00 IDIOPATHIC GOUT, UNSPECIFIED CHRONICITY, UNSPECIFIED SITE: ICD-10-CM

## 2021-06-17 PROCEDURE — 97110 THERAPEUTIC EXERCISES: CPT

## 2021-06-17 NOTE — PROGRESS NOTES
Daily Note     Today's date: 2021  Patient name: Hieu Trent  : 1970  MRN: [id number]  Referring provider: Arlette Goodpasture, MD  Dx:   Encounter Diagnosis     ICD-10-CM    1  Disc displacement, lumbar  M51 26    2  Lumbar disc herniation with radiculopathy  M51 16                   Subjective: Pt reports his back is a little sore today likely after attempting to demonstrate deadlifts to his son with an empty bar at the gym  He reports he also had a lot of meetings at work yesterday, and can only tolerate about 90 mins of sitting before he has to stand up and move around  He does feel that his HEP is helping relieve pain at home  Objective: See treatment diary below      Assessment: Pt tolerated treatment well  Demonstrates slowly improving carryover with form during TE completion  Notes he does not have any glute pain with repeated flexion today  Pt continues to demonstrate poor lumbar mobility and would benefit from continued therapy to improve mobility  Plan: Continue per plan of care        Precautions: myotomal weakness L4       Manuals   617          Lumbar mobilization  SE 8' Gr III  np                                                 Neuro Re-Ed                                                                                                        Ther Ex             sidelying lumbar rotation (R) (HEP) 10 x 5"  2x10 5"  2x10 5"          Repeated lumbar flexion (HEP)  X 10  2x10  2x10          Nustep   10' L4  10' L5          Cat Cow   20x5"  20x5"          Posterior Hip Rocking Quadruped   20x5"  20x5"          Seated Thoracic Rotation   20x5" B 20x5"  B          Lumbar Physioball Rollouts   20x5"  20x5"                       Ther Activity                                       Gait Training                                       Modalities

## 2021-06-18 ENCOUNTER — OFFICE VISIT (OUTPATIENT)
Dept: PHYSICAL THERAPY | Facility: REHABILITATION | Age: 51
End: 2021-06-18
Payer: COMMERCIAL

## 2021-06-18 DIAGNOSIS — M51.16 LUMBAR DISC HERNIATION WITH RADICULOPATHY: ICD-10-CM

## 2021-06-18 DIAGNOSIS — M51.26 DISC DISPLACEMENT, LUMBAR: Primary | ICD-10-CM

## 2021-06-18 PROCEDURE — 97112 NEUROMUSCULAR REEDUCATION: CPT | Performed by: PHYSICAL THERAPIST

## 2021-06-18 PROCEDURE — 97110 THERAPEUTIC EXERCISES: CPT | Performed by: PHYSICAL THERAPIST

## 2021-06-18 NOTE — PROGRESS NOTES
Daily Note     Today's date: 2021  Patient name: Tanya Trevizo  : 1970  MRN: [id number]  Referring provider: Norma Orlando MD  Dx:   Encounter Diagnosis     ICD-10-CM    1  Disc displacement, lumbar  M51 26    2  Lumbar disc herniation with radiculopathy  M51 16                   Subjective: Pt reports that he was able to demonstrate a hack squat to his son in the gym without any symptoms  He was on the stairmaster for approx 15 min this morning with onset of minimal pain at approx 8 min  Objective: See treatment diary below      Assessment: Pt tolerated treatment well  Patient is appropriately challenged with addition of lumbopelvic stability exercises  Patient educated in TrA activation and bridges for HEP update  Patient complains of R hamstring cramping with initiation of bridging, which was improved with cueing for quad activation  Plan: Continue per plan of care        Precautions: myotomal weakness L4       Manuals   617          Lumbar mobilization  SE 8' Gr III  np                                                 Neuro Re-Ed             TrA activation    20x5s         Bridge w/ TrA    2x10                                                                          Ther Ex             sidelying lumbar rotation (R) (HEP) 10 x 5"  2x10 5"  2x10 5"          Repeated lumbar flexion (HEP)  X 10  2x10  2x10 2x10         Nustep   10' L4  10' L5 10 min L5         Cat Cow   20x5"  20x5" 20x5"         Posterior Hip Rocking Quadruped   20x5"  20x5" 20x5"         Seated Thoracic Rotation   20x5" B 20x5"  B 20x5" B         Lumbar Physioball Rollouts   20x5"  20x5" 20x5"                      Ther Activity                                       Gait Training                                       Modalities

## 2021-06-22 NOTE — TELEPHONE ENCOUNTER
Patient can start engaging in core exercises    He is to increase his activity slowly and as tolerated

## 2021-06-22 NOTE — TELEPHONE ENCOUNTER
Pt reports 100% improvement post inj   He wants to know at what point is he able to start doing core work   He said hes been working out but he hasnt incorporated doing any core exercises yet

## 2021-06-25 ENCOUNTER — APPOINTMENT (OUTPATIENT)
Dept: PHYSICAL THERAPY | Facility: REHABILITATION | Age: 51
End: 2021-06-25
Payer: COMMERCIAL

## 2021-06-25 NOTE — PROGRESS NOTES
Daily Note     Today's date: 2021  Patient name: Chin Escalona  : 1970  MRN: [id number]  Referring provider: Don Fatima MD  Dx: No diagnosis found  Subjective:       Objective: See treatment diary below      Assessment:       Plan: Continue per plan of care        Precautions: myotomal weakness L4       Manuals   617         Lumbar mobilization  SE 8' Gr III  np                                                 Neuro Re-Ed             TrA activation    20x5s 20x5s        Bridge w/ TrA    2x10 2x10                                                                         Ther Ex             sidelying lumbar rotation (R) (HEP) 10 x 5"  2x10 5"  2x10 5"          Repeated lumbar flexion (HEP)  X 10  2x10  2x10 2x10 2x10        Nustep   10' L4  10' L5 10 min L5 10' L5        Cat Cow   20x5"  20x5" 20x5" 20x5"        Posterior Hip Rocking Quadruped   20x5"  20x5" 20x5" 20x5"        Seated Thoracic Rotation   20x5" B 20x5"  B 20x5" B 20x5" B        Lumbar Physioball Rollouts   20x5"  20x5" 20x5" 20x5"                      Ther Activity                                       Gait Training                                       Modalities

## 2021-06-28 RX ORDER — COLCHICINE 0.6 MG/1
TABLET ORAL
Qty: 60 TABLET | Refills: 1 | Status: SHIPPED | OUTPATIENT
Start: 2021-06-28 | End: 2021-07-12

## 2021-07-01 ENCOUNTER — OFFICE VISIT (OUTPATIENT)
Dept: PHYSICAL THERAPY | Facility: REHABILITATION | Age: 51
End: 2021-07-01
Payer: COMMERCIAL

## 2021-07-01 DIAGNOSIS — M51.26 DISC DISPLACEMENT, LUMBAR: Primary | ICD-10-CM

## 2021-07-01 DIAGNOSIS — M51.16 LUMBAR DISC HERNIATION WITH RADICULOPATHY: ICD-10-CM

## 2021-07-01 PROCEDURE — 97110 THERAPEUTIC EXERCISES: CPT | Performed by: PHYSICAL THERAPIST

## 2021-07-01 PROCEDURE — 97112 NEUROMUSCULAR REEDUCATION: CPT | Performed by: PHYSICAL THERAPIST

## 2021-07-01 NOTE — PROGRESS NOTES
Daily Note     Today's date: 2021  Patient name: Arcelia Maguire  : 1970  MRN: [id number]  Referring provider: Aviva Brandt MD  Dx:   Encounter Diagnosis     ICD-10-CM    1  Disc displacement, lumbar  M51 26    2  Lumbar disc herniation with radiculopathy  M51 16    Patient is treated by WADE Jason under direct supervision of Essence Saxena PT  Subjective: Pt reports that he is feeling good today and that he feels he is progressing well  He explains that he tried the leg press and an ab workout at the gym and that increased his low back pain, but squats did not  The soreness lasted about a day and a half and reports improvements with stretching  Objective: See treatment diary below      Assessment: Tolerated treatment well  Pt notes a stretching/tightening in his R buttocks/low back region with repeated flexion  Pt experienced cramping in his R hamstring with TrA activation exercises due to difficulty activating the glutes instead of the hamstring  A piriformis stretch was added this session due to complaints of tightness in that region  Patient exhibited good technique with therapeutic exercises and would benefit from continued PT      Plan: Continue per plan of care  Progress treatment as tolerated         Precautions: myotomal weakness L4       Manuals   617  7        Lumbar mobilization  SE 8' Gr III  np                                                 Neuro Re-Ed             TrA activation    20x5s 20x5s        Bridge w/ TrA    2x10 2x10        Glute sets     5"x15                                                            Ther Ex             sidelying lumbar rotation (R) (HEP) 10 x 5"  2x10 5"  2x10 5"          Repeated lumbar flexion (HEP)  X 10  2x10  2x10 2x10 2x10        Nustep   10' L4  10' L5 10 min L5 R bike 10'        Cat Cow   20x5"  20x5" 20x5" 20x5"        Posterior Hip Rocking Quadruped   20x5"  20x5" 20x5" 20x5"        Seated Thoracic Rotation 20x5" B 20x5"  B 20x5" B 20x5" B        Lumbar Physioball Rollouts   20x5"  20x5" 20x5" 20x5"        Pirifomis stretch on R     20"x4        Ther Activity                                       Gait Training                                       Modalities

## 2021-07-02 ENCOUNTER — OFFICE VISIT (OUTPATIENT)
Dept: PHYSICAL THERAPY | Facility: REHABILITATION | Age: 51
End: 2021-07-02
Payer: COMMERCIAL

## 2021-07-02 DIAGNOSIS — M51.26 DISC DISPLACEMENT, LUMBAR: Primary | ICD-10-CM

## 2021-07-02 DIAGNOSIS — M51.16 LUMBAR DISC HERNIATION WITH RADICULOPATHY: ICD-10-CM

## 2021-07-02 PROCEDURE — 97110 THERAPEUTIC EXERCISES: CPT | Performed by: PHYSICAL THERAPIST

## 2021-07-02 PROCEDURE — 97112 NEUROMUSCULAR REEDUCATION: CPT | Performed by: PHYSICAL THERAPIST

## 2021-07-02 NOTE — PROGRESS NOTES
Daily Note     Today's date: 2021  Patient name: Armando Slater  : 1970  MRN: [id number]  Referring provider: Alvarado Spence MD  Dx:   Encounter Diagnosis     ICD-10-CM    1  Disc displacement, lumbar  M51 26    2  Lumbar disc herniation with radiculopathy  M51 16    Patient is treated by WADE Ibrahim under direct supervision of [facility],   Subjective: Pt reports he had no increases in symptoms after yesterdays session  He notes continued discomfort with the leg press at the gym but explains his LEs are feeling stronger  Objective: See treatment diary below      Assessment: Tolerated treatment well  Standing lumbar flexion was modified to seated lumbar flexion due to previous session discomfort and pt tolerated modification well  A seated hamstring stretch was also added to address reduced flexibility  Consider adding lumbar rotational exercises in future sessions  Patient demonstrated fatigue post treatment, exhibited good technique with therapeutic exercises and would benefit from continued PT      Plan: Continue per plan of care  Progress treatment as tolerated         Precautions: myotomal weakness L4       Manuals   617  72       Lumbar mobilization  SE 8' Gr III  np   nv                                              Neuro Re-Ed             TrA activation    20x5s 20x5s 20x5s       Bridge w/ TrA    2x10 2x10 3" 2x10       Glute sets     5"x15 5"x15                                                           Ther Ex             sidelying lumbar rotation (R) (HEP) 10 x 5"  2x10 5"  2x10 5"          Repeated lumbar flexion (HEP)  X 10  2x10  2x10 2x10 2x10 seated 2x10       Nustep   10' L4  10' L5 10 min L5 R bike 10' 10' L5       Cat Cow   20x5"  20x5" 20x5" 20x5" 20x5"       Posterior Hip Rocking Quadruped   20x5"  20x5" 20x5" 20x5" 20x5"       Seated Thoracic Rotation   20x5" B 20x5"  B 20x5" B 20x5" B 20x5" B       Lumbar Physioball Rollouts   20x5" 20x5" 20x5" 20x5" 20x5"       Pirifomis stretch on R     20"x4 20"x4       Hamstring stretch on R      20"x4       Ther Activity                                       Gait Training                                       Modalities

## 2021-07-08 ENCOUNTER — OFFICE VISIT (OUTPATIENT)
Dept: PAIN MEDICINE | Facility: CLINIC | Age: 51
End: 2021-07-08
Payer: COMMERCIAL

## 2021-07-08 VITALS
DIASTOLIC BLOOD PRESSURE: 95 MMHG | HEART RATE: 76 BPM | WEIGHT: 296 LBS | BODY MASS INDEX: 36.8 KG/M2 | SYSTOLIC BLOOD PRESSURE: 148 MMHG | HEIGHT: 75 IN

## 2021-07-08 DIAGNOSIS — G89.4 CHRONIC PAIN SYNDROME: Primary | ICD-10-CM

## 2021-07-08 DIAGNOSIS — M47.816 LUMBAR SPONDYLOSIS: ICD-10-CM

## 2021-07-08 DIAGNOSIS — M51.16 LUMBAR DISC HERNIATION WITH RADICULOPATHY: ICD-10-CM

## 2021-07-08 PROCEDURE — 3008F BODY MASS INDEX DOCD: CPT | Performed by: NURSE PRACTITIONER

## 2021-07-08 PROCEDURE — 1036F TOBACCO NON-USER: CPT | Performed by: NURSE PRACTITIONER

## 2021-07-08 PROCEDURE — 99213 OFFICE O/P EST LOW 20 MIN: CPT | Performed by: NURSE PRACTITIONER

## 2021-07-08 NOTE — PROGRESS NOTES
Assessment:  1  Chronic pain syndrome    2  Lumbar disc herniation with radiculopathy    3  Lumbar spondylosis        Plan:  1  Patient doing well status post right L4 and L5 TFESI  I discussed with the patient that since there has been moderate to significant improvement in the pain symptoms, we will hold off on any repeat injections at this point in time  However, I reviewed with the patient that if their symptoms should return or worsen,  they should call our office to schedule to discuss repeating the injection  The patient was agreeable and verbalized an understanding  2  Patient will longer taking gabapentin  3  Patient may continue diclofenac 75 mg b i d  p r n  as prescribed by his PCP  4  Patient will continue with physical therapy and his home exercise program  5  I will follow up with the patient on a p r n  basis at this time  M*Modal software was used to dictate this note  It may contain errors with dictating incorrect words or incorrect spelling  Please contact the provider directly with any questions  History of Present Illness: The patient is a 46 y o  male last seen on 6/3/21 who presents for a follow up office visit in regards to chronic  Low back pain with radiculopathy into the right lower extremity secondary to  Lumbar degenerative disc disease, lumbar stenosis, lumbar spondylosis, lumbar radiculopathy and chronic pain syndrome  The patient denies left lower extremity symptoms, bowel or bladder incontinence or saddle anesthesia  The patient is status post right L4 and L5 TFESI with Dr Debbie Penn on June 8, 2021 and continues with an ongoing 95% improvement of his pain from his procedure at this time  He was prescribed gabapentin last office visit but is no longer taking this medication  He does intermittently take diclofenac 75 mg twice a day p r n  He continues with physical therapy and chiropractic therapy    He has tried oral steroids, NSAIDs, Tylenol and tramadol in the past without relief prior to his injection  He currently rates his pain as 0 to 1/10 on the numeric pain rating scale  He will occasionally have pain at night which he describes as throbbing and pressure-like  I have personally reviewed and/or updated the patient's past medical history, past surgical history, family history, social history, current medications, allergies, and vital signs today  Review of Systems:    Review of Systems   Respiratory: Negative for shortness of breath  Cardiovascular: Negative for chest pain  Gastrointestinal: Negative for constipation, diarrhea, nausea and vomiting  Musculoskeletal: Negative for arthralgias, gait problem, joint swelling and myalgias  Skin: Negative for rash  Neurological: Negative for dizziness, seizures and weakness  All other systems reviewed and are negative          Past Medical History:   Diagnosis Date    Asthma     Asthmatic bronchitis     Body mass index 34 0-34 9, adult     Convulsions (HCC)     Diverticulitis     Diverticulosis     sigmoid colon - on colonoscopy 1/31/2011     Gastric ulcer     Gout     Left shoulder pain     Lumbar disc herniation with radiculopathy     Osteoarthritis     Pharyngitis     Pulmonary embolism (HCC) 08/15/2012    Right knee pain     Rotator cuff syndrome of left shoulder     Routine general medical examination at a health care facility     SOB (shortness of breath)     Spontaneous rupture of tendon of left shoulder     Syncope and collapse     Vestibular neuritis        Past Surgical History:   Procedure Laterality Date    COLON SURGERY  08/08/2012    Sigmoid colon removed for diverticulosis     EGD AND COLONOSCOPY  01/31/2011    Dr Jovi Adhikari History   Problem Relation Age of Onset    Hypertension Mother     Hyperlipidemia Mother        Social History     Occupational History    Not on file   Tobacco Use    Smoking status: Never Smoker    Smokeless tobacco: Never Used   Vaping Use    Vaping Use: Never used   Substance and Sexual Activity    Alcohol use: Not Currently    Drug use: Not on file    Sexual activity: Not on file         Current Outpatient Medications:     allopurinol (ZYLOPRIM) 100 mg tablet, Take 1 tablet (100 mg total) by mouth daily, Disp: 30 tablet, Rfl: 3    colchicine (COLCRYS) 0 6 mg tablet, TAKE 1 TABLET BY MOUTH 2 TIMES A DAY, Disp: 60 tablet, Rfl: 1    diclofenac (VOLTAREN) 75 mg EC tablet, TAKE 1 TABLET BY MOUTH TWICE A DAY, Disp: 60 tablet, Rfl: 0    albuterol (PROVENTIL HFA,VENTOLIN HFA) 90 mcg/act inhaler, Inhale 2 puffs every 6 (six) hours as needed for wheezing (Patient not taking: Reported on 7/8/2021), Disp: , Rfl:     gabapentin (NEURONTIN) 300 mg capsule, Take 1 capsule (300 mg total) by mouth 3 (three) times a day (Patient not taking: Reported on 7/8/2021), Disp: 90 capsule, Rfl: 1    meclizine (ANTIVERT) 25 mg tablet, Take 1 tablet (25 mg total) by mouth 3 (three) times a day as needed for dizziness (Patient not taking: Reported on 5/13/2021), Disp: 30 tablet, Rfl: 0    methocarbamol (ROBAXIN) 750 mg tablet, Take 1 tablet (750 mg total) by mouth every 6 (six) hours as needed for muscle spasms (Patient not taking: Reported on 6/3/2021), Disp: 30 tablet, Rfl: 0    omeprazole (PriLOSEC) 20 mg delayed release capsule, TAKE 1 CAPSULE (20 MG TOTAL) BY MOUTH DAILY BEFORE BREAKFAST (Patient not taking: Reported on 6/3/2021), Disp: 90 capsule, Rfl: 1    traMADol (ULTRAM) 50 mg tablet, Take 1 tablet (50 mg total) by mouth every 8 (eight) hours as needed for moderate pain (Patient not taking: Reported on 6/3/2021), Disp: 20 tablet, Rfl: 0    Allergies   Allergen Reactions    Cefazolin Hives    Metronidazole Hives    Levofloxacin Palpitations       Physical Exam:    /95   Pulse 76   Ht 6' 3" (1 905 m)   Wt 134 kg (296 lb)   BMI 37 00 kg/m²     Constitutional:normal, well developed, well nourished, alert, in no distress and non-toxic and no overt pain behavior  Eyes:anicteric  HEENT:grossly intact  Neck:supple, symmetric, trachea midline and no masses   Pulmonary:even and unlabored  Cardiovascular:No edema or pitting edema present  Skin:Normal without rashes or lesions and well hydrated  Psychiatric:Mood and affect appropriate  Neurologic:Cranial Nerves II-XII grossly intact  Musculoskeletal:normal  Gait      Imaging  No orders to display     MRI LUMBAR SPINE WITHOUT CONTRAST   INDICATION: [id number]: Intervertebral disc disorders with radiculopathy, lumbar region  Dysesthesia right leg   COMPARISON: None  TECHNIQUE: Sagittal T1, sagittal T2, sagittal inversion recovery, axial T1 and axial T2, coronal T2    IMAGE QUALITY: Diagnostic   FINDINGS:   VERTEBRAL BODIES: There are 5 nonrib-bearing lumbar type vertebral bodies  Straightening of normal lumbar lordosis  Normal marrow signal is identified within the visualized bony structures  No discrete marrow lesion  SACRUM: Normal signal within the sacrum  No evidence of insufficiency or stress fracture  DISTAL CORD AND CONUS: Normal size and signal within the distal cord and conus  Conus terminates at the T12-L1 level  PARASPINAL SOFT TISSUES: Paraspinal soft tissues are unremarkable  LOWER THORACIC DISC SPACES: Normal disc height and signal  No disc herniation, canal stenosis or foraminal narrowing  LUMBAR DISC SPACES:   L1-L2: Minor facet arthrosis slight desiccation of the disc   L2-L3: Normal    L3-L4: Minor desiccation, slight bulge  L4-L5: Circumferential bulging of the disc asymmetrically extending into the right foramen  Disc is in contact with post foraminal L4 root  Correlate for right L4 radiculitis  There is minor narrowing of the right lateral recess with potential impact   right L5 root  Correlate for right L5 radiculitis  L5-S1: Minor facet arthrosis minimal bulge     IMPRESSION:   Right posterolateral disc osteophyte at the L4-L5 with potential impact on the right L4 and L5 nerve roots  Correlate for candidate for right L4-L5 transforaminal nerve block  No orders of the defined types were placed in this encounter

## 2021-07-12 DIAGNOSIS — M10.00 IDIOPATHIC GOUT, UNSPECIFIED CHRONICITY, UNSPECIFIED SITE: ICD-10-CM

## 2021-07-12 RX ORDER — COLCHICINE 0.6 MG/1
TABLET ORAL
Qty: 180 TABLET | Refills: 1 | Status: SHIPPED | OUTPATIENT
Start: 2021-07-12 | End: 2022-06-06 | Stop reason: SDUPTHER

## 2021-07-27 ENCOUNTER — OFFICE VISIT (OUTPATIENT)
Dept: INTERNAL MEDICINE CLINIC | Facility: CLINIC | Age: 51
End: 2021-07-27
Payer: COMMERCIAL

## 2021-07-27 VITALS
TEMPERATURE: 98.9 F | OXYGEN SATURATION: 97 % | WEIGHT: 303 LBS | SYSTOLIC BLOOD PRESSURE: 110 MMHG | BODY MASS INDEX: 37.67 KG/M2 | DIASTOLIC BLOOD PRESSURE: 80 MMHG | HEART RATE: 103 BPM | HEIGHT: 75 IN

## 2021-07-27 DIAGNOSIS — R19.7 DIARRHEA, UNSPECIFIED TYPE: ICD-10-CM

## 2021-07-27 DIAGNOSIS — M51.16 LUMBAR DISC HERNIATION WITH RADICULOPATHY: ICD-10-CM

## 2021-07-27 DIAGNOSIS — M47.816 LUMBAR SPONDYLOSIS: ICD-10-CM

## 2021-07-27 DIAGNOSIS — J45.20 MILD INTERMITTENT ASTHMA WITHOUT COMPLICATION: Primary | ICD-10-CM

## 2021-07-27 DIAGNOSIS — G89.4 CHRONIC PAIN SYNDROME: ICD-10-CM

## 2021-07-27 PROCEDURE — 99213 OFFICE O/P EST LOW 20 MIN: CPT | Performed by: INTERNAL MEDICINE

## 2021-07-27 PROCEDURE — 1036F TOBACCO NON-USER: CPT | Performed by: INTERNAL MEDICINE

## 2021-07-27 NOTE — PROGRESS NOTES
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  HPI    The following portions of the patient's history were reviewed and updated as appropriate: allergies, current medications, past family history, past medical history, past social history, past surgical history, and problem list     Review of Systems   Constitutional: Negative  HENT: Negative for dental problem, drooling, ear discharge and ear pain  Eyes: Negative for discharge, redness and itching  Respiratory: Negative for apnea, cough and wheezing  Cardiovascular: Negative for chest pain and palpitations  Gastrointestinal: Positive for abdominal pain and diarrhea  Negative for blood in stool and vomiting  Endocrine: Negative for polydipsia, polyphagia and polyuria  Genitourinary: Negative for decreased urine volume, dysuria and frequency  Musculoskeletal: Negative for arthralgias, myalgias and neck stiffness  Skin: Negative for pallor and wound  Allergic/Immunologic: Negative for environmental allergies and food allergies  Neurological: Negative for facial asymmetry, light-headedness, numbness and headaches  Hematological: Negative for adenopathy  Does not bruise/bleed easily  Psychiatric/Behavioral: Negative for agitation, behavioral problems and confusion  Objective:      /80 (BP Location: Right arm, Patient Position: Sitting, Cuff Size: Standard)   Pulse 103   Temp 98 9 °F (37 2 °C) (Temporal)   Ht 6' 3" (1 905 m)   Wt (!) 137 kg (303 lb)   SpO2 97%   BMI 37 87 kg/m²          Physical Exam  Constitutional:       Appearance: Normal appearance  He is obese  HENT:      Head: Normocephalic  Nose: Nose normal       Mouth/Throat:      Mouth: Mucous membranes are moist    Eyes:      Pupils: Pupils are equal, round, and reactive to light  Cardiovascular:      Rate and Rhythm: Regular rhythm  Heart sounds: Normal heart sounds  Pulmonary:      Breath sounds: Normal breath sounds  Abdominal:      Palpations: Abdomen is soft  Musculoskeletal:         General: No swelling  Cervical back: Neck supple  Skin:     General: Skin is warm  Neurological:      General: No focal deficit present  Mental Status: He is alert and oriented to person, place, and time  Psychiatric:         Mood and Affect: Mood normal        Acute  Diarrhea   Most  Likely  Due  To  Toxin  Induced  From  Food  No  Fever , no  Blood  In the stool,  And no  Vomiting    Patient  Has not  Had  Any diarrhea since yesterday  He  Was told  To  Call  If  Diarrhea  Returns  ,right  Now  Just  Liquid  Diet  And  Progress to low  Fat diet  Gradually  Call me  Back  If  Diarrhea  Reoccurs        EDDA Salas MD

## 2021-08-04 ENCOUNTER — TELEPHONE (OUTPATIENT)
Dept: PAIN MEDICINE | Facility: CLINIC | Age: 51
End: 2021-08-04

## 2021-08-04 NOTE — TELEPHONE ENCOUNTER
If patient's pain is the exact same as before when he received previous epidural, he can be scheduled for repeat injection

## 2021-08-04 NOTE — TELEPHONE ENCOUNTER
S/w the patient to review  He stated it feels like the pain has traveled to his left LB now  It does not radiate to the right side  He is having a hard time standing straight  Before he stated it was the right LB that hurt  Please advise   Thanks

## 2021-08-04 NOTE — TELEPHONE ENCOUNTER
Pt called in to let the NP know that the pain has returned   And does he qualify for another injections  Please be advise thank you        Please call patient back @   [phone]

## 2021-08-04 NOTE — TELEPHONE ENCOUNTER
If his pain is different than he will need to be scheduled for an office visit for re-evaluation before moving forward with any treatment options

## 2021-08-06 ENCOUNTER — TELEPHONE (OUTPATIENT)
Dept: INTERNAL MEDICINE CLINIC | Facility: CLINIC | Age: 51
End: 2021-08-06

## 2021-08-06 NOTE — TELEPHONE ENCOUNTER
Pt left message stating he would like to talk to you about his back  He said it has been out all week and he doesn't think he can make it into the office for a visit   If you could call him he said that would be greatly appreciated his call back number is [phone]

## 2021-08-06 NOTE — TELEPHONE ENCOUNTER
Can you call Dr Margaux Mcintosh office pain management if they can see him today or Monday, he been to him before, in June this year

## 2021-08-09 ENCOUNTER — OFFICE VISIT (OUTPATIENT)
Dept: PAIN MEDICINE | Facility: CLINIC | Age: 51
End: 2021-08-09
Payer: COMMERCIAL

## 2021-08-09 VITALS
HEIGHT: 75 IN | HEART RATE: 97 BPM | DIASTOLIC BLOOD PRESSURE: 86 MMHG | SYSTOLIC BLOOD PRESSURE: 142 MMHG | WEIGHT: 303 LBS | BODY MASS INDEX: 37.67 KG/M2

## 2021-08-09 DIAGNOSIS — M79.18 MYOFASCIAL PAIN SYNDROME: ICD-10-CM

## 2021-08-09 DIAGNOSIS — M46.1 SACROILIITIS (HCC): Primary | ICD-10-CM

## 2021-08-09 PROCEDURE — 99214 OFFICE O/P EST MOD 30 MIN: CPT | Performed by: NURSE PRACTITIONER

## 2021-08-09 PROCEDURE — 3008F BODY MASS INDEX DOCD: CPT | Performed by: INTERNAL MEDICINE

## 2021-08-09 RX ORDER — TIZANIDINE 2 MG/1
2 TABLET ORAL EVERY 8 HOURS PRN
Qty: 90 TABLET | Refills: 1 | Status: SHIPPED | OUTPATIENT
Start: 2021-08-09 | End: 2022-01-14

## 2021-08-09 NOTE — PATIENT INSTRUCTIONS
Sacroiliac Joint Injection   WHAT YOU NEED TO KNOW:   What do I need to know about a sacroiliac joint injection? A sacroiliac (SI) joint injection is done to diagnose or treat pain from sacroiliac joint syndrome  The pain caused by this syndrome may be felt in your lower back, buttocks, groin, and your thigh  How do I prepare for an SI injection? Your healthcare provider will ask you to not take any pain medicine the day of the injection  Ask him if there are any other medicines you should not take  You will need to find someone to drive you home after your procedure  What will happen during the SI injection? You will be awake for your injection  You may be given calming medicine if you are anxious  · You will lie on your stomach with a pillow under your abdomen  Your healthcare provider will give you an injection of medicine to numb the area  He may use an x-ray, ultrasound, or CT scan to find the area to inject  You may also be given an injection of contrast material to help your SI joint show up better  Then, your healthcare provider will inject local anesthesia, antiinflammatory medicine, or both into your SI joint  · Healthcare providers will watch you closely for any problems for up to 30 minutes after your injection  Your healthcare provider will check to see if your pain decreases after the injection  What are the risks of an SI injection? You may have some weakness for a short time after your injection  The SI injection can cause bleeding, infection, and pain  It can also cause temporary weakness in your leg and problems urinating  You may have an allergic reaction to the medicine that is injected into your SI joint  Your pain may return and you may need more treatment  CARE AGREEMENT:   You have the right to help plan your care  Learn about your health condition and how it may be treated  Discuss treatment options with your healthcare providers to decide what care you want to receive   You always have the right to refuse treatment  The above information is an  only  It is not intended as medical advice for individual conditions or treatments  Talk to your doctor, nurse or pharmacist before following any medical regimen to see if it is safe and effective for you  © Copyright [employer] 2021 Information is for End User's use only and may not be sold, redistributed or otherwise used for commercial purposes  All illustrations and images included in CareNotes® are the copyrighted property of [employer]  or [facility] [place]  Lower Back Exercises   WHAT YOU NEED TO KNOW:   Lower back exercises help heal and strengthen your back muscles to prevent another injury  Ask your healthcare provider if you need to see a physical therapist for more advanced exercises  DISCHARGE INSTRUCTIONS:   Return to the emergency department if:   You have severe pain that prevents you from moving  Contact your healthcare provider if:   Your pain becomes worse  You have new pain  You have questions or concerns about your condition or care  Do lower back exercises safely:   Do the exercises on a mat or firm surface  (not on a bed) to support your spine and prevent low back pain  Move slowly and smoothly  Avoid fast or jerky motions  Breathe normally  Do not hold your breath  Stop if you feel pain  It is normal to feel some discomfort at first  Regular exercise will help decrease your discomfort over time  Lower back exercises: Your healthcare provider may recommend that you do back exercises 10 to 30 minutes each day  He may also recommend that you do exercises 1 to 3 times each day  Ask your healthcare provider which exercises are best for you and how often to do them  Ankle pumps:  Lie on your back  Move your foot up (with your toes pointing toward your head)  Then, move your foot down (with your toes pointing away from you)   Repeat this exercise 10 times on each side          Heel slides:  Lie on your back  Slowly bend one leg and then straighten it  Next, bend the other leg and then straighten it  Repeat 10 times on each side  Pelvic tilt:  Lie on your back with your knees bent and feet flat on the floor  Place your arms in a relaxed position beside your body  Tighten the muscles of your abdomen and flatten your back against the floor  Hold for 5 seconds  Repeat 5 times  Back stretch:  Lie on your back with your hands behind your head  Bend your knees and turn the lower half of your body to one side  Hold this position for 10 seconds  Repeat 3 times on each side  Straight leg raises:  Lie on your back with one leg straight  Bend the other knee  Tighten your abdomen and then slowly lift the straight leg up about 6 to 12 inches off the floor  Hold for 1 to 5 seconds  Lower your leg slowly  Repeat 10 times on each leg  Knee-to-chest:  Lie on your back with your knees bent and feet flat on the floor  Pull one of your knees toward your chest and hold it there for 5 seconds  Return your leg to the starting position  Lift the other knee toward your chest and hold for 5 seconds  Do this 5 times on each side  Cat and camel:  Place your hands and knees on the floor  Arch your back upward toward the ceiling and lower your head  Round out your spine as much as you can  Hold for 5 seconds  Lift your head upward and push your chest downward toward the floor  Hold for 5 seconds  Do 3 sets or as directed  Wall squats:  Stand with your back against a wall  Tighten the muscles of your abdomen  Slowly lower your body until your knees are bent at a 45 degree angle  Hold this position for 5 seconds  Slowly move back up to a standing position  Repeat 10 times  Curl up:  Lie on your back with your knees bent and feet flat on the floor  Place your hands, palms down, underneath the curve in your lower back   Next, with your elbows on the floor, lift your shoulders and chest 2 to 3 inches  Keep your head in line with your shoulders  Hold this position for 5 seconds  When you can do this exercise without pain for 10 to 15 seconds, you may add a rotation  While your shoulders and chest are lifted off the ground, turn slightly to the left and hold  Repeat on the other side  Bird dog:  Place your hands and knees on the floor  Keep your wrists directly below your shoulders and your knees directly below your hips  Pull your belly button in toward your spine  Do not flatten or arch your back  Tighten your abdominal muscles  Raise one arm straight out so that it is aligned with your head  Next, raise the leg opposite your arm  Hold this position for 15 seconds  Lower your arm and leg slowly and change sides  Do 5 sets  © Copyright [employer] 2021 Information is for End User's use only and may not be sold, redistributed or otherwise used for commercial purposes  All illustrations and images included in CareNotes® are the copyrighted property of [employer]  or [address] [place], [place] [address]   The above information is an  only  It is not intended as medical advice for individual conditions or treatments  Talk to your doctor, nurse or pharmacist before following any medical regimen to see if it is safe and effective for you

## 2021-08-09 NOTE — PROGRESS NOTES
Assessment:  1  Sacroiliitis ([facility] [address] )    2  Myofascial pain syndrome        Plan:  1  Based on patient report and physical exam, the patient's symptomatology does seem to be consistent with sacroiliac mediated pain from sacroiliitis  We will schedule the patient for a left SIJ injection to decrease any inflammatory component of the patient's pain symptoms  Complete risks and benefits including bleeding, infection, tissue reaction, nerve injury and allergic reaction were discussed  The patient was agreeable and verbalized an understanding  2  I will trial tizanidine 2 mg q 8 hours p r n  myofascial pain  I advised the patient that they should not drive or operate machinery while on this medication until they see how it affects them, as it could cause lethargy and mental cloudyness  I advised the patient to call our office if they experience any side effects or issues with the medication changes  The patient verbalized an understanding  3  Patient may continue diclofenac as prescribed by his PCP   4  Patient will continue with his home exercise program as taught to him by physical therapy  5  The patient will follow-up after his procedure or sooner if needed     M*Modal software was used to dictate this note  It may contain errors with dictating incorrect words or incorrect spelling  Please contact the provider directly with any questions  History of Present Illness: The patient is a 46 y o  male last seen on 7/8/21 who presents for a follow up office visit in regards toLeft-sided low back pain that will radiate bilaterally across his belt line  The patient denies any radiating symptoms into the lower extremities, bowel or bladder incontinence or saddle anesthesia  Patient is right lower extremity symptoms were resolved status post right L4 and L5 TFESI in June 2021  The patient states this left-sided low back pain has been going on since August 2, 2021 without inciting event or trauma    He has tried diclofenac, ice, and methocarbamol without relief  He locates most of his pain over the left SI joint  He rates his pain a 5/10 on the numeric pain rating scale  States the pain is intermittent in nature and bothersome the morning at night  He characterizes the pain as sharp, throbbing, cramping and pressure like    I have personally reviewed and/or updated the patient's past medical history, past surgical history, family history, social history, current medications, allergies, and vital signs today  Review of Systems:    Review of Systems   Respiratory: Negative for shortness of breath  Cardiovascular: Negative for chest pain  Gastrointestinal: Negative for constipation, diarrhea, nausea and vomiting  Musculoskeletal: Negative for arthralgias, gait problem, joint swelling and myalgias  Skin: Negative for rash  Neurological: Negative for dizziness, seizures and weakness  All other systems reviewed and are negative          Past Medical History:   Diagnosis Date    Asthma     Asthmatic bronchitis     Body mass index 34 0-34 9, adult     Convulsions (HCC)     Diverticulitis     Diverticulosis     sigmoid colon - on colonoscopy 1/31/2011     Gastric ulcer     Gout     Left shoulder pain     Lumbar disc herniation with radiculopathy     Osteoarthritis     Pharyngitis     Pulmonary embolism (HCC) 08/15/2012    Right knee pain     Rotator cuff syndrome of left shoulder     Routine general medical examination at a health care facility     SOB (shortness of breath)     Spontaneous rupture of tendon of left shoulder     Syncope and collapse     Vestibular neuritis        Past Surgical History:   Procedure Laterality Date    COLON SURGERY  08/08/2012    Sigmoid colon removed for diverticulosis     EGD AND COLONOSCOPY  01/31/2011    Dr Danielle Reis         Family History   Problem Relation Age of Onset    Hypertension Mother     Hyperlipidemia Mother        Social History     Occupational History    Not on file   Tobacco Use    Smoking status: Never Smoker    Smokeless tobacco: Never Used   Vaping Use    Vaping Use: Never used   Substance and Sexual Activity    Alcohol use: Not Currently    Drug use: Not on file    Sexual activity: Not on file         Current Outpatient Medications:     allopurinol (ZYLOPRIM) 100 mg tablet, Take 1 tablet (100 mg total) by mouth daily, Disp: 30 tablet, Rfl: 3    colchicine (COLCRYS) 0 6 mg tablet, TAKE 1 TABLET BY MOUTH 2 TIMES A DAY, Disp: 180 tablet, Rfl: 1    diclofenac (VOLTAREN) 75 mg EC tablet, TAKE 1 TABLET BY MOUTH TWICE A DAY, Disp: 60 tablet, Rfl: 0    albuterol (PROVENTIL HFA,VENTOLIN HFA) 90 mcg/act inhaler, Inhale 2 puffs every 6 (six) hours as needed for wheezing (Patient not taking: Reported on 7/8/2021), Disp: , Rfl:     meclizine (ANTIVERT) 25 mg tablet, Take 1 tablet (25 mg total) by mouth 3 (three) times a day as needed for dizziness (Patient not taking: Reported on 5/13/2021), Disp: 30 tablet, Rfl: 0    omeprazole (PriLOSEC) 20 mg delayed release capsule, TAKE 1 CAPSULE (20 MG TOTAL) BY MOUTH DAILY BEFORE BREAKFAST (Patient not taking: Reported on 6/3/2021), Disp: 90 capsule, Rfl: 1    tiZANidine (ZANAFLEX) 2 mg tablet, Take 1 tablet (2 mg total) by mouth every 8 (eight) hours as needed for muscle spasms, Disp: 90 tablet, Rfl: 1    traMADol (ULTRAM) 50 mg tablet, Take 1 tablet (50 mg total) by mouth every 8 (eight) hours as needed for moderate pain (Patient not taking: Reported on 6/3/2021), Disp: 20 tablet, Rfl: 0    Allergies   Allergen Reactions    Cefazolin Hives    Metronidazole Hives    Levofloxacin Palpitations       Physical Exam:    /86   Pulse 97   Ht 6' 3" (1 905 m)   Wt (!) 137 kg (303 lb)   BMI 37 87 kg/m²     Constitutional:normal, well developed, well nourished, alert, in no distress and non-toxic and no overt pain behavior    Eyes:anicteric  HEENT:grossly intact  Neck:supple, symmetric, trachea midline and no masses   Pulmonary:even and unlabored  Cardiovascular:No edema or pitting edema present  Skin:Normal without rashes or lesions and well hydrated  Psychiatric:Mood and affect appropriate  Neurologic:Cranial Nerves II-XII grossly intact  Musculoskeletal:normal gait  Bilateral lumbar paraspinal musculature tender palpation, left greater than right  Left SI joint tender to palpation  Bilateral lower extremity strength 5/5 in all muscle groups  Positive Simi finger, Mac's test, and Gaenslen's test on the left and negative on the right  Negative straight leg raise bilaterally      Imaging  FL spine and pain procedure    (Results Pending)     MRI LUMBAR SPINE WITHOUT CONTRAST   INDICATION: [id number]: Intervertebral disc disorders with radiculopathy, lumbar region  Dysesthesia right leg   COMPARISON: None  TECHNIQUE: Sagittal T1, sagittal T2, sagittal inversion recovery, axial T1 and axial T2, coronal T2    IMAGE QUALITY: Diagnostic   FINDINGS:   VERTEBRAL BODIES: There are 5 nonrib-bearing lumbar type vertebral bodies  Straightening of normal lumbar lordosis  Normal marrow signal is identified within the visualized bony structures  No discrete marrow lesion  SACRUM: Normal signal within the sacrum  No evidence of insufficiency or stress fracture  DISTAL CORD AND CONUS: Normal size and signal within the distal cord and conus  Conus terminates at the T12-L1 level  PARASPINAL SOFT TISSUES: Paraspinal soft tissues are unremarkable  LOWER THORACIC DISC SPACES: Normal disc height and signal  No disc herniation, canal stenosis or foraminal narrowing  LUMBAR DISC SPACES:   L1-L2: Minor facet arthrosis slight desiccation of the disc   L2-L3: Normal    L3-L4: Minor desiccation, slight bulge  L4-L5: Circumferential bulging of the disc asymmetrically extending into the right foramen  Disc is in contact with post foraminal L4 root  Correlate for right L4 radiculitis   There is minor narrowing of the right lateral recess with potential impact   right L5 root  Correlate for right L5 radiculitis  L5-S1: Minor facet arthrosis minimal bulge  IMPRESSION:   Right posterolateral disc osteophyte at the L4-L5 with potential impact on the right L4 and L5 nerve roots  Correlate for candidate for right L4-L5 transforaminal nerve block         Orders Placed This Encounter   Procedures    FL spine and pain procedure

## 2021-09-07 ENCOUNTER — HOSPITAL ENCOUNTER (OUTPATIENT)
Dept: RADIOLOGY | Facility: CLINIC | Age: 51
Discharge: HOME | End: 2021-09-07
Attending: ANESTHESIOLOGY
Payer: COMMERCIAL

## 2021-09-07 VITALS
DIASTOLIC BLOOD PRESSURE: 91 MMHG | OXYGEN SATURATION: 94 % | HEART RATE: 94 BPM | TEMPERATURE: 98.2 F | SYSTOLIC BLOOD PRESSURE: 156 MMHG | RESPIRATION RATE: 20 BRPM

## 2021-09-07 DIAGNOSIS — M46.1 SACROILIITIS (HCC): ICD-10-CM

## 2021-09-07 PROCEDURE — 27096 INJECT SACROILIAC JOINT: CPT | Performed by: ANESTHESIOLOGY

## 2021-09-07 RX ORDER — BUPIVACAINE HCL/PF 2.5 MG/ML
30 VIAL (ML) INJECTION ONCE
Status: COMPLETED | OUTPATIENT
Start: 2021-09-07 | End: 2021-09-07

## 2021-09-07 RX ORDER — LIDOCAINE HYDROCHLORIDE 10 MG/ML
5 INJECTION, SOLUTION EPIDURAL; INFILTRATION; INTRACAUDAL; PERINEURAL ONCE
Status: COMPLETED | OUTPATIENT
Start: 2021-09-07 | End: 2021-09-07

## 2021-09-07 RX ORDER — METHYLPREDNISOLONE ACETATE 40 MG/ML
40 INJECTION, SUSPENSION INTRA-ARTICULAR; INTRALESIONAL; INTRAMUSCULAR; PARENTERAL; SOFT TISSUE ONCE
Status: COMPLETED | OUTPATIENT
Start: 2021-09-07 | End: 2021-09-07

## 2021-09-07 RX ADMIN — IOHEXOL 0.5 ML: 300 INJECTION, SOLUTION INTRAVENOUS at 10:16

## 2021-09-07 RX ADMIN — BUPIVACAINE HYDROCHLORIDE 1.5 ML: 2.5 INJECTION, SOLUTION EPIDURAL; INFILTRATION; INTRACAUDAL at 10:17

## 2021-09-07 RX ADMIN — METHYLPREDNISOLONE ACETATE 40 MG: 40 INJECTION, SUSPENSION INTRA-ARTICULAR; INTRALESIONAL; INTRAMUSCULAR; SOFT TISSUE at 10:17

## 2021-09-07 RX ADMIN — LIDOCAINE HYDROCHLORIDE 2 ML: 10 INJECTION, SOLUTION EPIDURAL; INFILTRATION; INTRACAUDAL; PERINEURAL at 10:16

## 2021-09-07 NOTE — H&P
History of Present Illness: The patient is a 46 y o  male who presents with complaints of  Left-sided low back pain      Patient Active Problem List   Diagnosis    Gout    Asthma    Diverticulitis    Diverticulosis large intestine w/o perforation or abscess w/o bleeding    Body mass index 36 0-36 9, adult    Gastroesophageal reflux disease without esophagitis    Lumbar disc herniation with radiculopathy    Lumbar spondylosis    Chronic pain syndrome       Past Medical History:   Diagnosis Date    Asthma     Asthmatic bronchitis     Body mass index 34 0-34 9, adult     Convulsions ([address] )     Diverticulitis     Diverticulosis     sigmoid colon - on colonoscopy 1/31/2011     Gastric ulcer     Gout     Left shoulder pain     Lumbar disc herniation with radiculopathy     Osteoarthritis     Pharyngitis     Pulmonary embolism ([address] ) 08/15/2012    Right knee pain     Rotator cuff syndrome of left shoulder     Routine general medical examination at a health care facility     SOB (shortness of breath)     Spontaneous rupture of tendon of left shoulder     Syncope and collapse     Vestibular neuritis        Past Surgical History:   Procedure Laterality Date    COLON SURGERY  08/08/2012    Sigmoid colon removed for diverticulosis     EGD AND COLONOSCOPY  01/31/2011    Dr Parish Alfaro           Current Outpatient Medications:     albuterol (PROVENTIL HFA,VENTOLIN HFA) 90 mcg/act inhaler, Inhale 2 puffs every 6 (six) hours as needed for wheezing (Patient not taking: Reported on 7/8/2021), Disp: , Rfl:     allopurinol (ZYLOPRIM) 100 mg tablet, Take 1 tablet (100 mg total) by mouth daily, Disp: 30 tablet, Rfl: 3    colchicine (COLCRYS) 0 6 mg tablet, TAKE 1 TABLET BY MOUTH 2 TIMES A DAY, Disp: 180 tablet, Rfl: 1    diclofenac (VOLTAREN) 75 mg EC tablet, TAKE 1 TABLET BY MOUTH TWICE A DAY, Disp: 60 tablet, Rfl: 0    meclizine (ANTIVERT) 25 mg tablet, Take 1 tablet (25 mg total) by mouth 3 (three) times a day as needed for dizziness (Patient not taking: Reported on 5/13/2021), Disp: 30 tablet, Rfl: 0    omeprazole (PriLOSEC) 20 mg delayed release capsule, TAKE 1 CAPSULE (20 MG TOTAL) BY MOUTH DAILY BEFORE BREAKFAST (Patient not taking: Reported on 6/3/2021), Disp: 90 capsule, Rfl: 1    tiZANidine (ZANAFLEX) 2 mg tablet, Take 1 tablet (2 mg total) by mouth every 8 (eight) hours as needed for muscle spasms, Disp: 90 tablet, Rfl: 1    traMADol (ULTRAM) 50 mg tablet, Take 1 tablet (50 mg total) by mouth every 8 (eight) hours as needed for moderate pain (Patient not taking: Reported on 6/3/2021), Disp: 20 tablet, Rfl: 0    Current Facility-Administered Medications:     bupivacaine (PF) (MARCAINE) 0 25 % injection 30 mL, 30 mL, Intra-articular, Once, Mansoor Earl, DO    iohexol (OMNIPAQUE) 300 mg/mL injection 50 mL, 50 mL, Intra-articular, Once, Mansoor Earl, DO    lidocaine (PF) (XYLOCAINE-MPF) 1 % injection 5 mL, 5 mL, Other, Once, Mansoor Earl, DO    methylPREDNISolone acetate (DEPO-MEDROL) injection 40 mg, 40 mg, Intra-articular, Once, Mansoor Earl, DO    Allergies   Allergen Reactions    Cefazolin Hives    Metronidazole Hives    Levofloxacin Palpitations       Physical Exam:   Vitals:    09/07/21 1004   BP: 146/92   Pulse: 99   Resp: 20   Temp: 98 2 °F (36 8 °C)   SpO2: 95%     General: Awake, Alert, Oriented x 3, Mood and affect appropriate  Respiratory: Respirations even and unlabored  Cardiovascular: Peripheral pulses intact; no edema  Musculoskeletal Exam:   Tenderness to palpation over left SI joint  ASA Score: 2         Assessment:   1   Sacroiliitis ([facility] [address] )        Plan: Left SIJ  Injection

## 2021-09-07 NOTE — DISCHARGE INSTRUCTIONS

## 2021-09-14 ENCOUNTER — TELEPHONE (OUTPATIENT)
Dept: PAIN MEDICINE | Facility: CLINIC | Age: 51
End: 2021-09-14

## 2021-12-17 ENCOUNTER — IMMUNIZATIONS (OUTPATIENT)
Dept: FAMILY MEDICINE CLINIC | Facility: HOSPITAL | Age: 51
End: 2021-12-17

## 2021-12-17 DIAGNOSIS — Z23 ENCOUNTER FOR IMMUNIZATION: Primary | ICD-10-CM

## 2021-12-17 PROCEDURE — 0001A COVID-19 PFIZER VACC 0.3 ML: CPT

## 2021-12-17 PROCEDURE — 91300 COVID-19 PFIZER VACC 0.3 ML: CPT

## 2021-12-29 ENCOUNTER — TELEMEDICINE (OUTPATIENT)
Dept: INTERNAL MEDICINE CLINIC | Facility: CLINIC | Age: 51
End: 2021-12-29
Payer: COMMERCIAL

## 2021-12-29 VITALS — TEMPERATURE: 98.6 F

## 2021-12-29 DIAGNOSIS — R09.81 SINUS CONGESTION: Primary | ICD-10-CM

## 2021-12-29 DIAGNOSIS — J40 BRONCHITIS: ICD-10-CM

## 2021-12-29 PROCEDURE — 99213 OFFICE O/P EST LOW 20 MIN: CPT | Performed by: INTERNAL MEDICINE

## 2021-12-29 RX ORDER — AMOXICILLIN AND CLAVULANATE POTASSIUM 875; 125 MG/1; MG/1
1 TABLET, FILM COATED ORAL EVERY 12 HOURS SCHEDULED
Qty: 10 TABLET | Refills: 0 | Status: SHIPPED | OUTPATIENT
Start: 2021-12-29 | End: 2022-01-03

## 2021-12-29 RX ORDER — PREDNISONE 10 MG/1
50 TABLET ORAL DAILY
Qty: 25 TABLET | Refills: 0 | Status: SHIPPED | OUTPATIENT
Start: 2021-12-29 | End: 2022-01-03

## 2021-12-29 RX ORDER — ALBUTEROL SULFATE 90 UG/1
2 AEROSOL, METERED RESPIRATORY (INHALATION) EVERY 6 HOURS PRN
Qty: 8 G | Refills: 1 | Status: SHIPPED | OUTPATIENT
Start: 2021-12-29

## 2022-01-14 ENCOUNTER — TELEPHONE (OUTPATIENT)
Dept: INTERNAL MEDICINE CLINIC | Facility: CLINIC | Age: 52
End: 2022-01-14

## 2022-01-14 DIAGNOSIS — M51.16 LUMBAR DISC HERNIATION WITH RADICULOPATHY: Primary | ICD-10-CM

## 2022-01-14 RX ORDER — PREDNISONE 20 MG/1
20 TABLET ORAL DAILY
Qty: 7 TABLET | Refills: 0 | Status: SHIPPED | OUTPATIENT
Start: 2022-01-14 | End: 2022-01-21

## 2022-01-14 RX ORDER — CYCLOBENZAPRINE HCL 10 MG
10 TABLET ORAL 3 TIMES DAILY PRN
Qty: 15 TABLET | Refills: 0 | Status: SHIPPED | OUTPATIENT
Start: 2022-01-14 | End: 2022-03-21 | Stop reason: SDUPTHER

## 2022-01-14 NOTE — TELEPHONE ENCOUNTER
History of tendenitis of knee and leg  He has been takin Diclofenac This has been bothering his stomach terribly with diarrhea  Would like to know if there is something else he can take

## 2022-01-24 ENCOUNTER — TELEPHONE (OUTPATIENT)
Dept: INTERNAL MEDICINE CLINIC | Facility: CLINIC | Age: 52
End: 2022-01-24

## 2022-01-24 NOTE — TELEPHONE ENCOUNTER
Please call patient back about his emergency room visit he had this past weekend for gout  Needs to know how he should take his medications

## 2022-01-25 ENCOUNTER — OFFICE VISIT (OUTPATIENT)
Dept: INTERNAL MEDICINE CLINIC | Facility: CLINIC | Age: 52
End: 2022-01-25
Payer: COMMERCIAL

## 2022-01-25 VITALS
DIASTOLIC BLOOD PRESSURE: 74 MMHG | HEART RATE: 96 BPM | BODY MASS INDEX: 37.63 KG/M2 | SYSTOLIC BLOOD PRESSURE: 132 MMHG | OXYGEN SATURATION: 99 % | HEIGHT: 75 IN | WEIGHT: 302.6 LBS | TEMPERATURE: 98.4 F

## 2022-01-25 DIAGNOSIS — R73.01 IMPAIRED FASTING BLOOD SUGAR: ICD-10-CM

## 2022-01-25 DIAGNOSIS — E78.2 MIXED HYPERLIPIDEMIA: ICD-10-CM

## 2022-01-25 DIAGNOSIS — M10.00 ACUTE IDIOPATHIC GOUT, UNSPECIFIED SITE: Primary | ICD-10-CM

## 2022-01-25 DIAGNOSIS — J45.20 MILD INTERMITTENT ASTHMA WITHOUT COMPLICATION: ICD-10-CM

## 2022-01-25 DIAGNOSIS — M51.16 LUMBAR DISC HERNIATION WITH RADICULOPATHY: ICD-10-CM

## 2022-01-25 PROCEDURE — 3008F BODY MASS INDEX DOCD: CPT | Performed by: INTERNAL MEDICINE

## 2022-01-25 PROCEDURE — 99214 OFFICE O/P EST MOD 30 MIN: CPT | Performed by: INTERNAL MEDICINE

## 2022-01-25 PROCEDURE — 1036F TOBACCO NON-USER: CPT | Performed by: INTERNAL MEDICINE

## 2022-01-25 RX ORDER — DICLOFENAC SODIUM 75 MG/1
75 TABLET, DELAYED RELEASE ORAL 2 TIMES DAILY
Qty: 60 TABLET | Refills: 0 | Status: SHIPPED | OUTPATIENT
Start: 2022-01-25 | End: 2022-03-21 | Stop reason: SDUPTHER

## 2022-01-25 NOTE — PROGRESS NOTES
Assessment/Plan:    BMI Counseling: Body mass index is 37 82 kg/m²  The BMI is above normal  Nutrition recommendations include decreasing portion sizes, encouraging healthy choices of fruits and vegetables and decreasing fast food intake  Rationale for BMI follow-up plan is due to patient being overweight or obese  1  Acute idiopathic gout, unspecified site  -     diclofenac (VOLTAREN) 75 mg EC tablet; Take 1 tablet (75 mg total) by mouth 2 (two) times a day    2  Mild intermittent asthma without complication    3  Lumbar disc herniation with radiculopathy    4  Body mass index 37 0-37 9, adult  -     Hemoglobin A1C; Future  -     Lipid Panel with Direct LDL reflex; Future  -     TSH, 3rd generation; Future    5  Impaired fasting blood sugar  -     Hemoglobin A1C; Future  -     TSH, 3rd generation; Future    6  Mixed hyperlipidemia  -     Lipid Panel with Direct LDL reflex; Future  -     TSH, 3rd generation; Future           Subjective:      Patient ID: Hieu Trent is a 46 y o  male  Follow-up from ER, left foot pain, ER record reviewed, discussed with him      The following portions of the patient's history were reviewed and updated as appropriate: He  has a past medical history of Asthma, Asthmatic bronchitis, Body mass index 34 0-34 9, adult, Convulsions ([address] ), Diverticulitis, Diverticulosis, Gastric ulcer, Gout, Left shoulder pain, Lumbar disc herniation with radiculopathy, Osteoarthritis, Pharyngitis, Pulmonary embolism ([address] ) (08/15/2012), Right knee pain, Rotator cuff syndrome of left shoulder, Routine general medical examination at a health care facility, SOB (shortness of breath), Spontaneous rupture of tendon of left shoulder, Syncope and collapse, and Vestibular neuritis    He   Patient Active Problem List    Diagnosis Date Noted    Mild intermittent asthma without complication 28/53/8966    Impaired fasting blood sugar 01/25/2022    Mixed hyperlipidemia 01/25/2022    [facility]     Chronic pain syndrome 07/08/2021    Lumbar spondylosis 06/03/2021    Lumbar disc herniation with radiculopathy     Gastroesophageal reflux disease without esophagitis 01/12/2021    Diverticulosis large intestine w/o perforation or abscess w/o bleeding 10/05/2020    Body mass index 37 0-37 9, adult 10/05/2020    Gout     Asthma     Diverticulitis      He  has a past surgical history that includes Sinus surgery; EGD AND COLONOSCOPY (01/31/2011); and Colon surgery (08/08/2012)  His family history includes Hyperlipidemia in his mother; Hypertension in his mother  He  reports that he has never smoked  He has never used smokeless tobacco  He reports previous alcohol use  He reports that he does not use drugs  Current Outpatient Medications   Medication Sig Dispense Refill    albuterol (PROVENTIL HFA,VENTOLIN HFA) 90 mcg/act inhaler Inhale 2 puffs every 6 (six) hours as needed for wheezing 8 g 1    allopurinol (ZYLOPRIM) 100 mg tablet Take 1 tablet (100 mg total) by mouth daily 30 tablet 3    colchicine (COLCRYS) 0 6 mg tablet TAKE 1 TABLET BY MOUTH 2 TIMES A  tablet 1    diclofenac (VOLTAREN) 75 mg EC tablet Take 1 tablet (75 mg total) by mouth 2 (two) times a day 60 tablet 0    omeprazole (PriLOSEC) 20 mg delayed release capsule TAKE 1 CAPSULE (20 MG TOTAL) BY MOUTH DAILY BEFORE BREAKFAST 90 capsule 1    cyclobenzaprine (FLEXERIL) 10 mg tablet Take 1 tablet (10 mg total) by mouth 3 (three) times a day as needed for muscle spasms (Patient not taking: Reported on 1/25/2022 ) 15 tablet 0    fluticasone-salmeterol (Advair Diskus) 100-50 mcg/dose inhaler Inhale 1 puff 2 (two) times a day Rinse mouth after use   (Patient not taking: Reported on 1/25/2022 ) 60 blister 1    HYDROcodone-homatropine (HYCODAN) 5-1 5 mg/5 mL syrup Take 5 mL by mouth 4 (four) times a day as needed for cough Max Daily Amount: 20 mL (Patient not taking: Reported on 1/25/2022 ) 120 mL 0    meclizine (ANTIVERT) 25 mg tablet Take 1 tablet (25 mg total) by mouth 3 (three) times a day as needed for dizziness (Patient not taking: Reported on 1/25/2022 ) 30 tablet 0    traMADol (ULTRAM) 50 mg tablet Take 1 tablet (50 mg total) by mouth every 8 (eight) hours as needed for moderate pain (Patient not taking: Reported on 1/25/2022 ) 20 tablet 0     No current facility-administered medications for this visit  Current Outpatient Medications on File Prior to Visit   Medication Sig    albuterol (PROVENTIL HFA,VENTOLIN HFA) 90 mcg/act inhaler Inhale 2 puffs every 6 (six) hours as needed for wheezing    allopurinol (ZYLOPRIM) 100 mg tablet Take 1 tablet (100 mg total) by mouth daily    colchicine (COLCRYS) 0 6 mg tablet TAKE 1 TABLET BY MOUTH 2 TIMES A DAY    omeprazole (PriLOSEC) 20 mg delayed release capsule TAKE 1 CAPSULE (20 MG TOTAL) BY MOUTH DAILY BEFORE BREAKFAST    [DISCONTINUED] diclofenac (VOLTAREN) 75 mg EC tablet TAKE 1 TABLET BY MOUTH TWICE A DAY    cyclobenzaprine (FLEXERIL) 10 mg tablet Take 1 tablet (10 mg total) by mouth 3 (three) times a day as needed for muscle spasms (Patient not taking: Reported on 1/25/2022 )    fluticasone-salmeterol (Advair Diskus) 100-50 mcg/dose inhaler Inhale 1 puff 2 (two) times a day Rinse mouth after use  (Patient not taking: Reported on 1/25/2022 )    HYDROcodone-homatropine (HYCODAN) 5-1 5 mg/5 mL syrup Take 5 mL by mouth 4 (four) times a day as needed for cough Max Daily Amount: 20 mL (Patient not taking: Reported on 1/25/2022 )    meclizine (ANTIVERT) 25 mg tablet Take 1 tablet (25 mg total) by mouth 3 (three) times a day as needed for dizziness (Patient not taking: Reported on 1/25/2022 )    traMADol (ULTRAM) 50 mg tablet Take 1 tablet (50 mg total) by mouth every 8 (eight) hours as needed for moderate pain (Patient not taking: Reported on 1/25/2022 )     No current facility-administered medications on file prior to visit       He is allergic to cefazolin, metronidazole, and levofloxacin       Review of Systems   Constitutional: Negative for chills and fever  HENT: Negative for congestion, ear pain and sore throat  Eyes: Negative for pain  Respiratory: Negative for cough and shortness of breath  Cardiovascular: Negative for chest pain and leg swelling  Gastrointestinal: Negative for abdominal pain, nausea and vomiting  Endocrine: Negative for polyuria  Genitourinary: Negative for difficulty urinating, frequency and urgency  Musculoskeletal: Positive for arthralgias and back pain  Skin: Negative for rash  Neurological: Negative for weakness and headaches  Psychiatric/Behavioral: Negative for sleep disturbance  The patient is not nervous/anxious  Objective:      /74 (BP Location: Left arm, Patient Position: Sitting, Cuff Size: Large)   Pulse 96   Temp 98 4 °F (36 9 °C) (Temporal)   Ht 6' 3" (1 905 m)   Wt (!) 137 kg (302 lb 9 6 oz)   SpO2 99%   BMI 37 82 kg/m²     No results found for this or any previous visit (from the past 1344 hour(s))  Physical Exam  Constitutional:       Appearance: Normal appearance  HENT:      Head: Normocephalic  Right Ear: Tympanic membrane, ear canal and external ear normal       Left Ear: Tympanic membrane, ear canal and external ear normal       Nose: Nose normal  No congestion  Mouth/Throat:      Mouth: Mucous membranes are moist       Pharynx: Oropharynx is clear  No oropharyngeal exudate or posterior oropharyngeal erythema  Eyes:      Extraocular Movements: Extraocular movements intact  Conjunctiva/sclera: Conjunctivae normal       Pupils: Pupils are equal, round, and reactive to light  Cardiovascular:      Rate and Rhythm: Normal rate and regular rhythm  Heart sounds: Normal heart sounds  No murmur heard  Pulmonary:      Effort: Pulmonary effort is normal       Breath sounds: Normal breath sounds  No wheezing or rales     Abdominal:      General: Bowel sounds are normal  There is no distension  Palpations: Abdomen is soft  Tenderness: There is no abdominal tenderness  Musculoskeletal:         General: Normal range of motion  Cervical back: Normal range of motion and neck supple  Right lower leg: No edema  Left lower leg: Edema present  Comments: Left foot dorsum swelling present, tenderness present laterally on the dorsum part, skin minimal redness present, movement painful   Lymphadenopathy:      Cervical: No cervical adenopathy  Skin:     General: Skin is warm  Neurological:      General: No focal deficit present  Mental Status: He is alert and oriented to person, place, and time

## 2022-03-21 ENCOUNTER — TELEPHONE (OUTPATIENT)
Dept: INTERNAL MEDICINE CLINIC | Facility: CLINIC | Age: 52
End: 2022-03-21

## 2022-03-21 DIAGNOSIS — M51.16 LUMBAR DISC HERNIATION WITH RADICULOPATHY: ICD-10-CM

## 2022-03-21 DIAGNOSIS — M10.00 ACUTE IDIOPATHIC GOUT, UNSPECIFIED SITE: ICD-10-CM

## 2022-03-21 RX ORDER — CYCLOBENZAPRINE HCL 10 MG
10 TABLET ORAL 3 TIMES DAILY PRN
Qty: 15 TABLET | Refills: 0 | Status: SHIPPED | OUTPATIENT
Start: 2022-03-21

## 2022-03-21 RX ORDER — DICLOFENAC SODIUM 75 MG/1
75 TABLET, DELAYED RELEASE ORAL 2 TIMES DAILY
Qty: 60 TABLET | Refills: 0 | Status: SHIPPED | OUTPATIENT
Start: 2022-03-21 | End: 2022-04-18

## 2022-03-21 NOTE — TELEPHONE ENCOUNTER
Pt called and wanted to let you know he bulged a disc in his back and would like you to prescribe what you usually prescribe for his back pain and pt would like to know if he should make an appt with dr Martha Goodrich for more injections

## 2022-04-17 DIAGNOSIS — M10.00 ACUTE IDIOPATHIC GOUT, UNSPECIFIED SITE: ICD-10-CM

## 2022-04-18 RX ORDER — DICLOFENAC SODIUM 75 MG/1
TABLET, DELAYED RELEASE ORAL
Qty: 60 TABLET | Refills: 0 | Status: SHIPPED | OUTPATIENT
Start: 2022-04-18

## 2022-05-24 ENCOUNTER — OFFICE VISIT (OUTPATIENT)
Dept: INTERNAL MEDICINE CLINIC | Facility: CLINIC | Age: 52
End: 2022-05-24
Payer: COMMERCIAL

## 2022-05-24 VITALS
HEIGHT: 75 IN | WEIGHT: 299 LBS | SYSTOLIC BLOOD PRESSURE: 134 MMHG | OXYGEN SATURATION: 95 % | TEMPERATURE: 98.9 F | HEART RATE: 91 BPM | DIASTOLIC BLOOD PRESSURE: 84 MMHG | BODY MASS INDEX: 37.18 KG/M2

## 2022-05-24 DIAGNOSIS — J04.10 TRACHEITIS: Primary | ICD-10-CM

## 2022-05-24 DIAGNOSIS — R05.9 COUGH: ICD-10-CM

## 2022-05-24 PROCEDURE — 3008F BODY MASS INDEX DOCD: CPT | Performed by: INTERNAL MEDICINE

## 2022-05-24 PROCEDURE — 1036F TOBACCO NON-USER: CPT | Performed by: INTERNAL MEDICINE

## 2022-05-24 PROCEDURE — 3725F SCREEN DEPRESSION PERFORMED: CPT | Performed by: INTERNAL MEDICINE

## 2022-05-24 PROCEDURE — 99213 OFFICE O/P EST LOW 20 MIN: CPT | Performed by: INTERNAL MEDICINE

## 2022-05-24 RX ORDER — PROMETHAZINE HYDROCHLORIDE AND CODEINE PHOSPHATE 6.25; 1 MG/5ML; MG/5ML
5 SYRUP ORAL EVERY 6 HOURS PRN
Qty: 150 ML | Refills: 0 | Status: SHIPPED | OUTPATIENT
Start: 2022-05-24

## 2022-05-24 RX ORDER — PREDNISONE 50 MG/1
50 TABLET ORAL DAILY
Qty: 7 TABLET | Refills: 0 | Status: SHIPPED | OUTPATIENT
Start: 2022-05-24

## 2022-05-24 RX ORDER — AZITHROMYCIN 250 MG/1
TABLET, FILM COATED ORAL
Qty: 6 TABLET | Refills: 0 | Status: SHIPPED | OUTPATIENT
Start: 2022-05-24 | End: 2022-05-29

## 2022-05-24 NOTE — PROGRESS NOTES
Assessment/Plan:             1  Tracheitis  -     azithromycin (Zithromax) 250 mg tablet; Take 2 tablets (500 mg total) by mouth daily for 1 day, THEN 1 tablet (250 mg total) daily for 4 days  -     predniSONE 50 mg tablet; Take 1 tablet (50 mg total) by mouth in the morning  2  Cough  -     promethazine-codeine (PHENERGAN WITH CODEINE) 6 25-10 mg/5 mL syrup; Take 5 mL by mouth every 6 (six) hours as needed for cough    3  Body mass index 37 0-37 9, adult           Subjective:      Patient ID: Dolly Sanabria is a 46 y o  male  Cough, wheezing,      The following portions of the patient's history were reviewed and updated as appropriate: He  has a past medical history of Asthma, Asthmatic bronchitis, Body mass index 34 0-34 9, adult, Convulsions ([address] ), Diverticulitis, Diverticulosis, Gastric ulcer, Gout, Left shoulder pain, Lumbar disc herniation with radiculopathy, Osteoarthritis, Pharyngitis, Pulmonary embolism ([address] ) (08/15/2012), Right knee pain, Rotator cuff syndrome of left shoulder, Routine general medical examination at a health care facility, SOB (shortness of breath), Spontaneous rupture of tendon of left shoulder, Syncope and collapse, and Vestibular neuritis    He   Patient Active Problem List    Diagnosis Date Noted    Tracheitis 05/24/2022    Cough 05/24/2022    Mild intermittent asthma without complication 61/97/8441    Impaired fasting blood sugar 01/25/2022    Mixed hyperlipidemia 01/25/2022    [address]     Chronic pain syndrome 07/08/2021    Lumbar spondylosis 06/03/2021    Lumbar disc herniation with radiculopathy     Gastroesophageal reflux disease without esophagitis 01/12/2021    Diverticulosis large intestine w/o perforation or abscess w/o bleeding 10/05/2020    Body mass index 37 0-37 9, adult 10/05/2020    Gout     Asthma     Diverticulitis      He  has a past surgical history that includes Sinus surgery; EGD AND COLONOSCOPY (01/31/2011); and Colon surgery (08/08/2012)  His family history includes Hyperlipidemia in his mother; Hypertension in his mother  He  reports that he has never smoked  He has never used smokeless tobacco  He reports previous alcohol use  He reports that he does not use drugs  Current Outpatient Medications   Medication Sig Dispense Refill    albuterol (PROVENTIL HFA,VENTOLIN HFA) 90 mcg/act inhaler Inhale 2 puffs every 6 (six) hours as needed for wheezing 8 g 1    azithromycin (Zithromax) 250 mg tablet Take 2 tablets (500 mg total) by mouth daily for 1 day, THEN 1 tablet (250 mg total) daily for 4 days  6 tablet 0    colchicine (COLCRYS) 0 6 mg tablet TAKE 1 TABLET BY MOUTH 2 TIMES A  tablet 1    predniSONE 50 mg tablet Take 1 tablet (50 mg total) by mouth in the morning  7 tablet 0    promethazine-codeine (PHENERGAN WITH CODEINE) 6 25-10 mg/5 mL syrup Take 5 mL by mouth every 6 (six) hours as needed for cough 150 mL 0    allopurinol (ZYLOPRIM) 100 mg tablet Take 1 tablet (100 mg total) by mouth daily (Patient not taking: Reported on 5/24/2022) 30 tablet 3    cyclobenzaprine (FLEXERIL) 10 mg tablet Take 1 tablet (10 mg total) by mouth 3 (three) times a day as needed for muscle spasms (Patient not taking: Reported on 5/24/2022) 15 tablet 0    diclofenac (VOLTAREN) 75 mg EC tablet TAKE 1 TABLET BY MOUTH TWICE A DAY (Patient not taking: Reported on 5/24/2022) 60 tablet 0    fluticasone-salmeterol (Advair Diskus) 100-50 mcg/dose inhaler Inhale 1 puff 2 (two) times a day Rinse mouth after use  (Patient not taking: No sig reported) 60 blister 1    omeprazole (PriLOSEC) 20 mg delayed release capsule TAKE 1 CAPSULE (20 MG TOTAL) BY MOUTH DAILY BEFORE BREAKFAST (Patient not taking: Reported on 5/24/2022) 90 capsule 1     No current facility-administered medications for this visit       Current Outpatient Medications on File Prior to Visit   Medication Sig    albuterol (PROVENTIL HFA,VENTOLIN HFA) 90 mcg/act inhaler Inhale 2 puffs every 6 (six) hours as needed for wheezing    colchicine (COLCRYS) 0 6 mg tablet TAKE 1 TABLET BY MOUTH 2 TIMES A DAY    allopurinol (ZYLOPRIM) 100 mg tablet Take 1 tablet (100 mg total) by mouth daily (Patient not taking: Reported on 5/24/2022)    cyclobenzaprine (FLEXERIL) 10 mg tablet Take 1 tablet (10 mg total) by mouth 3 (three) times a day as needed for muscle spasms (Patient not taking: Reported on 5/24/2022)    diclofenac (VOLTAREN) 75 mg EC tablet TAKE 1 TABLET BY MOUTH TWICE A DAY (Patient not taking: Reported on 5/24/2022)    fluticasone-salmeterol (Advair Diskus) 100-50 mcg/dose inhaler Inhale 1 puff 2 (two) times a day Rinse mouth after use  (Patient not taking: No sig reported)    omeprazole (PriLOSEC) 20 mg delayed release capsule TAKE 1 CAPSULE (20 MG TOTAL) BY MOUTH DAILY BEFORE BREAKFAST (Patient not taking: Reported on 5/24/2022)    [DISCONTINUED] HYDROcodone-homatropine (HYCODAN) 5-1 5 mg/5 mL syrup Take 5 mL by mouth 4 (four) times a day as needed for cough Max Daily Amount: 20 mL (Patient not taking: No sig reported)    [DISCONTINUED] meclizine (ANTIVERT) 25 mg tablet Take 1 tablet (25 mg total) by mouth 3 (three) times a day as needed for dizziness (Patient not taking: No sig reported)    [DISCONTINUED] traMADol (ULTRAM) 50 mg tablet Take 1 tablet (50 mg total) by mouth every 8 (eight) hours as needed for moderate pain (Patient not taking: No sig reported)     No current facility-administered medications on file prior to visit  He is allergic to cefazolin, metronidazole, and levofloxacin       Review of Systems   Constitutional: Negative for chills and fever  HENT: Negative for congestion, ear pain and sore throat  Eyes: Negative for pain  Respiratory: Positive for cough  Negative for shortness of breath  Cardiovascular: Negative for chest pain and leg swelling  Gastrointestinal: Negative for abdominal pain, nausea and vomiting  Endocrine: Negative for polyuria  Genitourinary: Negative for difficulty urinating, frequency and urgency  Musculoskeletal: Negative for arthralgias and back pain  Skin: Negative for rash  Neurological: Negative for weakness and headaches  Psychiatric/Behavioral: Negative for sleep disturbance  The patient is not nervous/anxious  Objective:      /84 (BP Location: Left arm, Patient Position: Sitting)   Pulse 91   Temp 98 9 °F (37 2 °C)   Ht 6' 3" (1 905 m)   Wt 136 kg (299 lb)   SpO2 95%   BMI 37 37 kg/m²     No results found for this or any previous visit (from the past 1344 hour(s))  Physical Exam  Constitutional:       Appearance: Normal appearance  HENT:      Head: Normocephalic  Right Ear: Tympanic membrane, ear canal and external ear normal       Left Ear: Tympanic membrane, ear canal and external ear normal       Nose: Nose normal  No congestion  Mouth/Throat:      Mouth: Mucous membranes are moist       Pharynx: Oropharynx is clear  No oropharyngeal exudate or posterior oropharyngeal erythema  Eyes:      Extraocular Movements: Extraocular movements intact  Conjunctiva/sclera: Conjunctivae normal       Pupils: Pupils are equal, round, and reactive to light  Cardiovascular:      Rate and Rhythm: Normal rate and regular rhythm  Heart sounds: Normal heart sounds  No murmur heard  Pulmonary:      Effort: Pulmonary effort is normal       Breath sounds: Wheezing present  No rales  Abdominal:      General: Bowel sounds are normal  There is no distension  Palpations: Abdomen is soft  Tenderness: There is no abdominal tenderness  Musculoskeletal:         General: Normal range of motion  Cervical back: Normal range of motion and neck supple  Right lower leg: No edema  Left lower leg: No edema  Lymphadenopathy:      Cervical: No cervical adenopathy  Skin:     General: Skin is warm  Neurological:      General: No focal deficit present        Mental Status: He is alert and oriented to person, place, and time

## 2022-06-06 ENCOUNTER — TELEPHONE (OUTPATIENT)
Dept: INTERNAL MEDICINE CLINIC | Facility: CLINIC | Age: 52
End: 2022-06-06

## 2022-06-06 DIAGNOSIS — M10.00 IDIOPATHIC GOUT, UNSPECIFIED CHRONICITY, UNSPECIFIED SITE: ICD-10-CM

## 2022-06-06 DIAGNOSIS — M10.00 ACUTE IDIOPATHIC GOUT, UNSPECIFIED SITE: ICD-10-CM

## 2022-06-06 RX ORDER — COLCHICINE 0.6 MG/1
0.6 TABLET ORAL 2 TIMES DAILY
Qty: 180 TABLET | Refills: 1 | Status: CANCELLED | OUTPATIENT
Start: 2022-06-06

## 2022-06-06 RX ORDER — ALLOPURINOL 100 MG/1
100 TABLET ORAL DAILY
Qty: 30 TABLET | Refills: 3 | Status: SHIPPED | OUTPATIENT
Start: 2022-06-06

## 2022-06-06 RX ORDER — COLCHICINE 0.6 MG/1
0.6 TABLET ORAL 2 TIMES DAILY
Qty: 180 TABLET | Refills: 0 | Status: SHIPPED | OUTPATIENT
Start: 2022-06-06

## 2022-06-06 NOTE — TELEPHONE ENCOUNTER
Patient called asking to speak with you regarding which gout medication he should be taking       # [phone]

## 2022-07-14 ENCOUNTER — HOSPITAL ENCOUNTER (EMERGENCY)
Facility: HOSPITAL | Age: 52
Discharge: HOME | End: 2022-07-14
Attending: EMERGENCY MEDICINE | Admitting: EMERGENCY MEDICINE
Payer: COMMERCIAL

## 2022-07-14 VITALS
SYSTOLIC BLOOD PRESSURE: 142 MMHG | HEIGHT: 75 IN | RESPIRATION RATE: 19 BRPM | DIASTOLIC BLOOD PRESSURE: 87 MMHG | BODY MASS INDEX: 37.37 KG/M2 | OXYGEN SATURATION: 96 % | HEART RATE: 104 BPM | TEMPERATURE: 98.4 F

## 2022-07-14 DIAGNOSIS — M10.9 GOUT: Primary | ICD-10-CM

## 2022-07-14 PROCEDURE — 99283 EMERGENCY DEPT VISIT LOW MDM: CPT

## 2022-07-14 PROCEDURE — 99283 EMERGENCY DEPT VISIT LOW MDM: CPT | Performed by: EMERGENCY MEDICINE

## 2022-07-14 PROCEDURE — 96372 THER/PROPH/DIAG INJ SC/IM: CPT

## 2022-07-14 RX ORDER — PREDNISONE 10 MG/1
10 TABLET ORAL DAILY
Qty: 63 TABLET | Refills: 0 | Status: SHIPPED | OUTPATIENT
Start: 2022-07-14 | End: 2022-09-05

## 2022-07-14 RX ORDER — KETOROLAC TROMETHAMINE 30 MG/ML
30 INJECTION, SOLUTION INTRAMUSCULAR; INTRAVENOUS ONCE
Status: COMPLETED | OUTPATIENT
Start: 2022-07-14 | End: 2022-07-14

## 2022-07-14 RX ORDER — OXYCODONE HYDROCHLORIDE AND ACETAMINOPHEN 5; 325 MG/1; MG/1
1 TABLET ORAL ONCE
Status: COMPLETED | OUTPATIENT
Start: 2022-07-14 | End: 2022-07-14

## 2022-07-14 RX ORDER — OXYCODONE HYDROCHLORIDE AND ACETAMINOPHEN 5; 325 MG/1; MG/1
1 TABLET ORAL EVERY 4 HOURS PRN
Qty: 20 TABLET | Refills: 0 | Status: SHIPPED | OUTPATIENT
Start: 2022-07-14 | End: 2022-07-18

## 2022-07-14 RX ORDER — DEXAMETHASONE SODIUM PHOSPHATE 4 MG/ML
10 INJECTION, SOLUTION INTRA-ARTICULAR; INTRALESIONAL; INTRAMUSCULAR; INTRAVENOUS; SOFT TISSUE ONCE
Status: COMPLETED | OUTPATIENT
Start: 2022-07-14 | End: 2022-07-14

## 2022-07-14 RX ADMIN — KETOROLAC TROMETHAMINE 30 MG: 30 INJECTION, SOLUTION INTRAMUSCULAR at 03:10

## 2022-07-14 RX ADMIN — OXYCODONE HYDROCHLORIDE AND ACETAMINOPHEN 1 TABLET: 5; 325 TABLET ORAL at 03:09

## 2022-07-14 RX ADMIN — DEXAMETHASONE SODIUM PHOSPHATE 10 MG: 4 INJECTION, SOLUTION INTRAMUSCULAR; INTRAVENOUS at 03:10

## 2022-07-14 NOTE — ED PROVIDER NOTES
History  Chief Complaint   Patient presents with    Foot Pain     Pt with hx gout; late taking normal meds and experiencing "flare up" in Left foot; difficulty walking due to pain;denies any other symptoms at this time  Patient is a 46year old male with worsening left foot pain c/w his gout since yesterday  No trauma  No fever  No N/V  States he did not drive here  Tried colchicine, allopurinol and voltaren without relief  Was last seen at [place] [address] ED on 1/23/22 for gout of left foot  [facility] website checked on this patient and last Rx filled was on 5/24/22 for promethazine with codeine for 7 day supply  History provided by:  Patient and spouse   used: No        Prior to Admission Medications   Prescriptions Last Dose Informant Patient Reported? Taking? albuterol (PROVENTIL HFA,VENTOLIN HFA) 90 mcg/act inhaler  Self No No   Sig: Inhale 2 puffs every 6 (six) hours as needed for wheezing   allopurinol (ZYLOPRIM) 100 mg tablet   No No   Sig: Take 1 tablet (100 mg total) by mouth daily   colchicine (COLCRYS) 0 6 mg tablet   No No   Sig: Take 1 tablet (0 6 mg total) by mouth 2 (two) times a day   cyclobenzaprine (FLEXERIL) 10 mg tablet   No No   Sig: Take 1 tablet (10 mg total) by mouth 3 (three) times a day as needed for muscle spasms   Patient not taking: Reported on 5/24/2022   diclofenac (VOLTAREN) 75 mg EC tablet   No No   Sig: TAKE 1 TABLET BY MOUTH TWICE A DAY   Patient not taking: Reported on 5/24/2022   fluticasone-salmeterol (Advair Diskus) 100-50 mcg/dose inhaler  Self No No   Sig: Inhale 1 puff 2 (two) times a day Rinse mouth after use  Patient not taking: No sig reported   omeprazole (PriLOSEC) 20 mg delayed release capsule  Self No No   Sig: TAKE 1 CAPSULE (20 MG TOTAL) BY MOUTH DAILY BEFORE BREAKFAST   Patient not taking: Reported on 5/24/2022   predniSONE 50 mg tablet   No No   Sig: Take 1 tablet (50 mg total) by mouth in the morning     promethazine-codeine (PHENERGAN WITH CODEINE) 6 25-10 mg/5 mL syrup   No No   Sig: Take 5 mL by mouth every 6 (six) hours as needed for cough      Facility-Administered Medications: None       Past Medical History:   Diagnosis Date    Asthma     Asthmatic bronchitis     Body mass index 34 0-34 9, adult     Convulsions (HCC)     Diverticulitis     Diverticulosis     sigmoid colon - on colonoscopy 1/31/2011     Gastric ulcer     Gout     Left shoulder pain     Lumbar disc herniation with radiculopathy     Osteoarthritis     Pharyngitis     Pulmonary embolism (HCC) 08/15/2012    Right knee pain     Rotator cuff syndrome of left shoulder     Routine general medical examination at a health care facility     SOB (shortness of breath)     Spontaneous rupture of tendon of left shoulder     Syncope and collapse     Vestibular neuritis        Past Surgical History:   Procedure Laterality Date    COLON SURGERY  08/08/2012    Sigmoid colon removed for diverticulosis     EGD AND COLONOSCOPY  01/31/2011    Dr Yohana Harris         Family History   Problem Relation Age of Onset    Hypertension Mother     Hyperlipidemia Mother      I have reviewed and agree with the history as documented  E-Cigarette/Vaping    E-Cigarette Use Never User      E-Cigarette/Vaping Substances    Nicotine No     THC No     CBD No     Flavoring No     Other No     Unknown No      Social History     Tobacco Use    Smoking status: Never Smoker    Smokeless tobacco: Never Used   Vaping Use    Vaping Use: Never used   Substance Use Topics    Alcohol use: Not Currently    Drug use: Never       Review of Systems   Constitutional: Negative for fever  Gastrointestinal: Negative for nausea and vomiting  Musculoskeletal:        Left foot pain         Physical Exam  Physical Exam  Vitals and nursing note reviewed  Constitutional:       General: He is in acute distress (moderate)  HENT:      Head: Normocephalic and atraumatic  Mouth/Throat:      Comments: Mask in place  Eyes:      General: No scleral icterus  Cardiovascular:      Rate and Rhythm: Regular rhythm  Tachycardia present  Heart sounds: Normal heart sounds  No murmur heard  Pulmonary:      Effort: Pulmonary effort is normal  No respiratory distress  Breath sounds: Normal breath sounds  Abdominal:      General: Bowel sounds are normal       Palpations: Abdomen is soft  Tenderness: There is no abdominal tenderness  Musculoskeletal:         General: Tenderness (dorsal lateral left foot) present  No deformity  Cervical back: Normal range of motion and neck supple  Right lower leg: No edema  Left lower leg: No edema  Skin:     General: Skin is warm and dry  Findings: No bruising, erythema or rash  Neurological:      Mental Status: He is alert and oriented to person, place, and time     Psychiatric:         Mood and Affect: Mood normal          Vital Signs  ED Triage Vitals [07/14/22 0259]   Temperature Pulse Respirations Blood Pressure SpO2   98 4 °F (36 9 °C) 104 19 142/87 96 %      Temp Source Heart Rate Source Patient Position - Orthostatic VS BP Location FiO2 (%)   Oral Monitor Sitting Right arm --      Pain Score       --           Vitals:    07/14/22 0259   BP: 142/87   Pulse: 104   Patient Position - Orthostatic VS: Sitting         Visual Acuity      ED Medications  Medications   ketorolac (TORADOL) injection 30 mg (has no administration in time range)   dexamethasone (DECADRON) injection 10 mg (has no administration in time range)   oxyCODONE-acetaminophen (PERCOCET) 5-325 mg per tablet 1 tablet (has no administration in time range)       Diagnostic Studies  Results Reviewed     None                 No orders to display              Procedures  Procedures         ED Course                                             MDM  Number of Diagnoses or Management Options  Diagnosis management comments: DDx including but not limited to: Gout attack, pseudogout, arthritis, DJD, bursitis, tendinitis; doubt cellulitis, abscess, osteomyelitis, fracture, dislocation, sprain, strain; doubt acute arterial occlusion or septic arthritis  Amount and/or Complexity of Data Reviewed  Decide to obtain previous medical records or to obtain history from someone other than the patient: yes  Review and summarize past medical records: yes        Disposition  Final diagnoses:   Gout     Time reflects when diagnosis was documented in both MDM as applicable and the Disposition within this note     Time User Action Codes Description Comment    7/14/2022  3:07 AM Bryce Quinonez Add [M10 9] Gout       ED Disposition     ED Disposition   Discharge    Condition   Stable    Date/Time   Thu Jul 14, 2022  3:07 AM    Comment   Simeon Valdes discharge to home/self care  Follow-up Information     Follow up With Specialties Details Why MD [facility] Call in 1 day No driving with percocet  Do not use acetaminophen with percocet  Continue current gout medications as well  Return sooner if increased pain, fever, vomiting, rash  [address]            Patient's Medications   Discharge Prescriptions    OXYCODONE-ACETAMINOPHEN (PERCOCET) 5-325 MG PER TABLET    Take 1 tablet by mouth every 4 (four) hours as needed for moderate pain for up to 4 days Max Daily Amount: 6 tablets       Start Date: 7/14/2022 End Date: 7/18/2022       Order Dose: 1 tablet       Quantity: 20 tablet    Refills: 0    PREDNISONE 10 MG TABLET    Take 1 tablet (10 mg total) by mouth daily 6 tabs for 3 days and then decrease by one tab every 3 days until complete       Start Date: 7/14/2022 End Date: --       Order Dose: 10 mg       Quantity: 63 tablet    Refills: 0       No discharge procedures on file      PDMP Review       Value Time User    PDMP Reviewed  Yes 7/14/2022  2:53 AM Fouzia Martinez MD ED Provider  Electronically Signed by           Sejal Johnson MD  07/14/22 3476

## 2022-08-09 ENCOUNTER — TELEPHONE (OUTPATIENT)
Dept: INTERNAL MEDICINE CLINIC | Facility: CLINIC | Age: 52
End: 2022-08-09

## 2022-08-24 DIAGNOSIS — M51.16 LUMBAR DISC HERNIATION WITH RADICULOPATHY: ICD-10-CM

## 2022-08-29 RX ORDER — CYCLOBENZAPRINE HCL 10 MG
10 TABLET ORAL 3 TIMES DAILY PRN
Qty: 15 TABLET | Refills: 0 | Status: SHIPPED | OUTPATIENT
Start: 2022-08-29

## 2022-09-05 ENCOUNTER — APPOINTMENT (OUTPATIENT)
Dept: RADIOLOGY | Facility: HOSPITAL | Age: 52
End: 2022-09-05
Payer: COMMERCIAL

## 2022-09-05 ENCOUNTER — HOSPITAL ENCOUNTER (EMERGENCY)
Facility: HOSPITAL | Age: 52
Discharge: HOME | End: 2022-09-05
Attending: EMERGENCY MEDICINE
Payer: COMMERCIAL

## 2022-09-05 VITALS
OXYGEN SATURATION: 100 % | HEART RATE: 94 BPM | DIASTOLIC BLOOD PRESSURE: 98 MMHG | RESPIRATION RATE: 20 BRPM | TEMPERATURE: 98.2 F | SYSTOLIC BLOOD PRESSURE: 151 MMHG

## 2022-09-05 DIAGNOSIS — M10.9 GOUT FLARE: Primary | ICD-10-CM

## 2022-09-05 PROCEDURE — 73610 X-RAY EXAM OF ANKLE: CPT

## 2022-09-05 PROCEDURE — 99284 EMERGENCY DEPT VISIT MOD MDM: CPT | Performed by: PHYSICIAN ASSISTANT

## 2022-09-05 PROCEDURE — 99283 EMERGENCY DEPT VISIT LOW MDM: CPT

## 2022-09-05 PROCEDURE — 96372 THER/PROPH/DIAG INJ SC/IM: CPT

## 2022-09-05 RX ORDER — PREDNISONE 10 MG/1
TABLET ORAL
Qty: 16 TABLET | Refills: 0 | Status: SHIPPED | OUTPATIENT
Start: 2022-09-06 | End: 2022-09-13

## 2022-09-05 RX ORDER — DEXAMETHASONE SODIUM PHOSPHATE 4 MG/ML
10 INJECTION, SOLUTION INTRA-ARTICULAR; INTRALESIONAL; INTRAMUSCULAR; INTRAVENOUS; SOFT TISSUE ONCE
Status: DISCONTINUED | OUTPATIENT
Start: 2022-09-05 | End: 2022-09-05

## 2022-09-05 RX ORDER — KETOROLAC TROMETHAMINE 30 MG/ML
15 INJECTION, SOLUTION INTRAMUSCULAR; INTRAVENOUS ONCE
Status: COMPLETED | OUTPATIENT
Start: 2022-09-05 | End: 2022-09-05

## 2022-09-05 RX ORDER — OXYCODONE HYDROCHLORIDE 5 MG/1
5 TABLET ORAL ONCE
Status: COMPLETED | OUTPATIENT
Start: 2022-09-05 | End: 2022-09-05

## 2022-09-05 RX ORDER — INDOMETHACIN 50 MG/1
50 CAPSULE ORAL
Qty: 15 CAPSULE | Refills: 0 | Status: SHIPPED | OUTPATIENT
Start: 2022-09-05 | End: 2022-10-05 | Stop reason: SDUPTHER

## 2022-09-05 RX ORDER — DEXAMETHASONE SODIUM PHOSPHATE 4 MG/ML
8 INJECTION, SOLUTION INTRA-ARTICULAR; INTRALESIONAL; INTRAMUSCULAR; INTRAVENOUS; SOFT TISSUE ONCE
Status: COMPLETED | OUTPATIENT
Start: 2022-09-05 | End: 2022-09-05

## 2022-09-05 RX ADMIN — OXYCODONE HYDROCHLORIDE 5 MG: 5 TABLET ORAL at 13:48

## 2022-09-05 RX ADMIN — KETOROLAC TROMETHAMINE 15 MG: 30 INJECTION, SOLUTION INTRAMUSCULAR at 13:48

## 2022-09-05 RX ADMIN — DEXAMETHASONE SODIUM PHOSPHATE 8 MG: 4 INJECTION, SOLUTION INTRAMUSCULAR; INTRAVENOUS at 13:48

## 2022-09-05 NOTE — ED NOTES
YARY at patient bedside to medically evaluate patient       Leidy Claire RN  09/05/22 [address], RN  09/05/22 4543

## 2022-09-05 NOTE — DISCHARGE INSTRUCTIONS
Please return to the emergency department for worsening symptoms including chest pain, shortness of breath, dizziness, lightheadedness, fever greater than 103, severe pain, inability to walk, fainting episodes, etc  Please follow-up with your family practice provider as soon as possible  I have sent medications over to the pharmacy to help with your symptoms  Please take as directed  Please follow-up with the rheumatologist as soon as possible

## 2022-09-05 NOTE — ED PROVIDER NOTES
History  Chief Complaint   Patient presents with    Gout Pain     Pt states "I think I'm having another gout flare" Pt having left foot and left ankle pain  He states he has been taking his colchicine  This is a 66-year-old male with past medical history significant for gout presenting to the emergency department today for left-sided ankle pain for approximately 3 days  He notes this feels like his typical gout flare  My gout always goes to my left ankle  He denies any fever or chills  He notes pain with inversion and eversion of the ankle but no pain with dorsiflexion and plantar flexion  He notes he is having a difficult time walking secondary to the pain  He has no calf pain bilaterally  He denies any injury to the ankle  He has been taking allopurinol and colchicine without relief  The patient denies other complaints at this time  History provided by:  Patient   used: No    Ankle Pain  Location:  Ankle  Time since incident:  3 days  Injury: no    Ankle location:  L ankle  Chronicity:  Recurrent  Dislocation: no    Foreign body present:  Unable to specify  Tetanus status:  Unknown  Prior injury to area:  No  Relieved by:  Nothing  Exacerbated by: eversion and inversion  Ineffective treatments: Allopurinol and Colchicine  Associated symptoms: decreased ROM and swelling    Associated symptoms: no back pain, no fatigue, no fever, no itching, no muscle weakness, no neck pain, no numbness, no stiffness and no tingling        Prior to Admission Medications   Prescriptions Last Dose Informant Patient Reported? Taking?    albuterol (PROVENTIL HFA,VENTOLIN HFA) 90 mcg/act inhaler  Self No No   Sig: Inhale 2 puffs every 6 (six) hours as needed for wheezing   allopurinol (ZYLOPRIM) 100 mg tablet   No No   Sig: Take 1 tablet (100 mg total) by mouth daily   colchicine (COLCRYS) 0 6 mg tablet   No No   Sig: Take 1 tablet (0 6 mg total) by mouth 2 (two) times a day   cyclobenzaprine (FLEXERIL) 10 mg tablet   No No   Sig: Take 1 tablet (10 mg total) by mouth 3 (three) times a day as needed for muscle spasms   diclofenac (VOLTAREN) 75 mg EC tablet   No No   Sig: TAKE 1 TABLET BY MOUTH TWICE A DAY   Patient not taking: Reported on 5/24/2022   fluticasone-salmeterol (Advair Diskus) 100-50 mcg/dose inhaler  Self No No   Sig: Inhale 1 puff 2 (two) times a day Rinse mouth after use  Patient not taking: No sig reported   omeprazole (PriLOSEC) 20 mg delayed release capsule  Self No No   Sig: TAKE 1 CAPSULE (20 MG TOTAL) BY MOUTH DAILY BEFORE BREAKFAST   Patient not taking: Reported on 5/24/2022   promethazine-codeine (PHENERGAN WITH CODEINE) 6 25-10 mg/5 mL syrup   No No   Sig: Take 5 mL by mouth every 6 (six) hours as needed for cough      Facility-Administered Medications: None       Past Medical History:   Diagnosis Date    Asthma     Asthmatic bronchitis     Body mass index 34 0-34 9, adult     Convulsions (HCC)     Diverticulitis     Diverticulosis     sigmoid colon - on colonoscopy 1/31/2011     Gastric ulcer     Gout     Left shoulder pain     Lumbar disc herniation with radiculopathy     Osteoarthritis     Pharyngitis     Pulmonary embolism (HCC) 08/15/2012    Right knee pain     Rotator cuff syndrome of left shoulder     Routine general medical examination at a health care facility     SOB (shortness of breath)     Spontaneous rupture of tendon of left shoulder     Syncope and collapse     Vestibular neuritis        Past Surgical History:   Procedure Laterality Date    COLON SURGERY  08/08/2012    Sigmoid colon removed for diverticulosis     EGD AND COLONOSCOPY  01/31/2011    Dr Grigsby Dub         Family History   Problem Relation Age of Onset    Hypertension Mother     Hyperlipidemia Mother      I have reviewed and agree with the history as documented      E-Cigarette/Vaping    E-Cigarette Use Never User      E-Cigarette/Vaping Substances    Nicotine No     THC No     CBD No     Flavoring No     Other No     Unknown No      Social History     Tobacco Use    Smoking status: Never Smoker    Smokeless tobacco: Never Used   Vaping Use    Vaping Use: Never used   Substance Use Topics    Alcohol use: Not Currently    Drug use: Never       Review of Systems   Constitutional: Negative for appetite change, chills, diaphoresis, fatigue and fever  Eyes: Negative for visual disturbance  Respiratory: Negative for cough, chest tightness, shortness of breath and wheezing  Cardiovascular: Negative for chest pain, palpitations and leg swelling  Gastrointestinal: Negative for abdominal pain, constipation, diarrhea, nausea and vomiting  Musculoskeletal: Positive for arthralgias (left ankle)  Negative for back pain, neck pain, neck stiffness and stiffness  Skin: Negative for itching, rash and wound  Neurological: Negative for dizziness, tremors, seizures, speech difficulty, weakness, light-headedness, numbness and headaches  Psychiatric/Behavioral: Negative for confusion  All other systems reviewed and are negative  Physical Exam  Physical Exam  Vitals and nursing note reviewed  Constitutional:       General: He is not in acute distress  Appearance: Normal appearance  He is normal weight  He is not ill-appearing, toxic-appearing or diaphoretic  HENT:      Head: Normocephalic and atraumatic  Nose: Nose normal  No congestion or rhinorrhea  Mouth/Throat:      Mouth: Mucous membranes are moist       Pharynx: No oropharyngeal exudate or posterior oropharyngeal erythema  Eyes:      General: No scleral icterus  Right eye: No discharge  Left eye: No discharge  Conjunctiva/sclera: Conjunctivae normal    Cardiovascular:      Rate and Rhythm: Normal rate and regular rhythm  Pulses: Normal pulses  Heart sounds: Normal heart sounds  No murmur heard  No friction rub  No gallop        Comments: 2+ DP pulses bilaterally  Pulmonary:      Effort: Pulmonary effort is normal  No respiratory distress  Breath sounds: Normal breath sounds  No stridor  No wheezing, rhonchi or rales  Chest:      Chest wall: No tenderness  Musculoskeletal:         General: Normal range of motion  Cervical back: Normal range of motion  No rigidity  Right lower leg: No edema  Left lower leg: No edema  Comments: Tenderness to palpation to the patient's left ankle specifically posterior to the medial malleolus; mild redness with some warmth noted; patient is able to dorsiflex and plantar flex his left ankle with no pain but does note pain with eversion and inversion of the left ankle; no ecchymosis   Skin:     General: Skin is warm and dry  Capillary Refill: Capillary refill takes less than 2 seconds  Coloration: Skin is not jaundiced or pale  Neurological:      General: No focal deficit present  Mental Status: He is alert and oriented to person, place, and time  Mental status is at baseline     Psychiatric:         Mood and Affect: Mood normal          Behavior: Behavior normal          Vital Signs  ED Triage Vitals [09/05/22 1203]   Temperature Pulse Respirations Blood Pressure SpO2   98 2 °F (36 8 °C) 94 20 151/98 100 %      Temp Source Heart Rate Source Patient Position - Orthostatic VS BP Location FiO2 (%)   Oral Monitor Sitting Left arm --      Pain Score       6           Vitals:    09/05/22 1203   BP: 151/98   Pulse: 94   Patient Position - Orthostatic VS: Sitting         Visual Acuity      ED Medications  Medications   ketorolac (TORADOL) injection 15 mg (15 mg Intramuscular Given 9/5/22 1348)   oxyCODONE (ROXICODONE) IR tablet 5 mg (5 mg Oral Given 9/5/22 1348)   dexamethasone (DECADRON) injection 8 mg (8 mg Intramuscular Given 9/5/22 1348)       Diagnostic Studies  Results Reviewed     None                 XR ankle 3+ views LEFT    (Results Pending)              Procedures  Procedures ED Course  ED Course as of 09/05/22 1540   Mon Sep 05, 2022   1428 Patient's wife will drive him home  1430 The patient is feeling markedly improved  Will DC with Indocin and prednisone  Dispo planning  MDM  Number of Diagnoses or Management Options  Gout flare: new and requires workup  Diagnosis management comments: 60-year-old male presenting to the emergency department today for left-sided ankle pain  He notes it is as his prior gout flares  Denies any systemic signs of infection  Pain with ambulation, eversion of the left ankle, and inversion of the left ankle  No trauma to the ankle  Vitals are stable  No acute osseous abnormalities on x-ray  Patient feeling better after dose of Toradol , dexamethasone, and Roxicodone  The patient is stable for discharge at this time  No contraindications to NSAIDs; prednisone and Indocin sent to the patient's pharmacy  Referral sent for rheumatology  Strict return precautions were given  Recommend PCP follow-up as soon as possible  The patient and/or patient's proxy verify their understanding and agree to the plan at this time  All questions answered to the patient and/or their proxy's satisfaction  All labs reviewed and utilized in the medical decision making process  All radiology studies independently viewed by me and interpreted by the radiologist  Portions of the record may have been created with voice recognition software   Occasional wrong word or "sound a like" substitutions may have occurred due to the inherent limitations of voice recognition software   Read the chart carefully and recognize, using context, where substitutions have occurred         Amount and/or Complexity of Data Reviewed  Tests in the radiology section of CPT®: ordered and reviewed  Independent visualization of images, tracings, or specimens: yes (XR Left Ankle)    Patient Progress  Patient progress: stable      Disposition  Final diagnoses:   Gout flare     Time reflects when diagnosis was documented in both MDM as applicable and the Disposition within this note     Time User Action Codes Description Comment    9/5/2022  2:21 PM Kobi Pedraza Add [M10 9] Gout flare       ED Disposition     ED Disposition   Discharge    Condition   Stable    Date/Time   Mon Sep 5, 2022  2:20 PM    Comment   Lilly Mary discharge to home/self care  Follow-up Information     Follow up With Specialties Details Why Contact Info Additional Information    Dayna Mahan MD Internal Medicine Schedule an appointment as soon as possible for a visit   [address] [facility]  [address] [facility] Emergency Medicine Go to  If symptoms worsen [address] [facility] [address] [facility], [address], [place], [address] [facility], [address]          Discharge Medication List as of 9/5/2022  2:25 PM      START taking these medications    Details   indomethacin (INDOCIN) 50 mg capsule Take 1 capsule (50 mg total) by mouth 3 (three) times a day with meals for 5 days, Starting Mon 9/5/2022, Until Sat 9/10/2022, Normal      predniSONE 10 mg tablet Multiple Dosages:Starting Tue 9/6/2022, Until Tue 9/6/2022 at 2359, THEN Starting Wed 9/7/2022, Until Wed 9/7/2022 at 2359, THEN Starting Thu 9/8/2022, Until Thu 9/8/2022 at 2359, THEN Starting Fri 9/9/2022, Until Fri 9/9/2022 at 2359, THEN Starting  Sat 9/10/2022, Until Sat 9/10/2022 at 2359, THEN Starting Sun 9/11/2022, Until Mon 9/12/2022 at 2359Take 5 tablets (50 mg total) by mouth daily for 1 day, THEN 4 tablets (40 mg total) daily for 1 day, THEN 3 tablets (30 mg total) daily for 1 day, THEN  2 tablets (20 mg total) daily for 1 day, THEN 1 tablet (10 mg total) daily for 1 day, THEN 0 5 tablets (5 mg total) daily for 2 days  , Normal         CONTINUE these medications which have NOT CHANGED    Details   albuterol (PROVENTIL HFA,VENTOLIN HFA) 90 mcg/act inhaler Inhale 2 puffs every 6 (six) hours as needed for wheezing, Starting Wed 12/29/2021, Normal      allopurinol (ZYLOPRIM) 100 mg tablet Take 1 tablet (100 mg total) by mouth daily, Starting Mon 6/6/2022, Normal      colchicine (COLCRYS) 0 6 mg tablet Take 1 tablet (0 6 mg total) by mouth 2 (two) times a day, Starting Mon 6/6/2022, Normal      cyclobenzaprine (FLEXERIL) 10 mg tablet Take 1 tablet (10 mg total) by mouth 3 (three) times a day as needed for muscle spasms, Starting Mon 8/29/2022, Normal      diclofenac (VOLTAREN) 75 mg EC tablet TAKE 1 TABLET BY MOUTH TWICE A DAY, Normal      fluticasone-salmeterol (Advair Diskus) 100-50 mcg/dose inhaler Inhale 1 puff 2 (two) times a day Rinse mouth after use , Starting Wed 12/29/2021, Normal      omeprazole (PriLOSEC) 20 mg delayed release capsule TAKE 1 CAPSULE (20 MG TOTAL) BY MOUTH DAILY BEFORE BREAKFAST, Starting Fri 2/19/2021, Normal      promethazine-codeine (PHENERGAN WITH CODEINE) 6 25-10 mg/5 mL syrup Take 5 mL by mouth every 6 (six) hours as needed for cough, Starting Tue 5/24/2022, Normal                 PDMP Review       Value Time User    PDMP Reviewed  Yes 7/14/2022  2:53 AM Darion Gregorio MD          ED Provider  Electronically Signed by           Vicki Diez PA-C  09/05/22 0702

## 2022-10-05 ENCOUNTER — HOSPITAL ENCOUNTER (EMERGENCY)
Facility: HOSPITAL | Age: 52
Discharge: HOME | End: 2022-10-05
Attending: EMERGENCY MEDICINE
Payer: COMMERCIAL

## 2022-10-05 ENCOUNTER — TELEPHONE (OUTPATIENT)
Dept: INTERNAL MEDICINE CLINIC | Facility: CLINIC | Age: 52
End: 2022-10-05

## 2022-10-05 VITALS
DIASTOLIC BLOOD PRESSURE: 89 MMHG | OXYGEN SATURATION: 98 % | SYSTOLIC BLOOD PRESSURE: 145 MMHG | TEMPERATURE: 98.4 F | HEART RATE: 66 BPM | RESPIRATION RATE: 18 BRPM

## 2022-10-05 DIAGNOSIS — M10.9 GOUT FLARE: ICD-10-CM

## 2022-10-05 DIAGNOSIS — M10.00 IDIOPATHIC GOUT, UNSPECIFIED CHRONICITY, UNSPECIFIED SITE: ICD-10-CM

## 2022-10-05 DIAGNOSIS — M10.9 ACUTE GOUT: Primary | ICD-10-CM

## 2022-10-05 PROCEDURE — 96372 THER/PROPH/DIAG INJ SC/IM: CPT

## 2022-10-05 PROCEDURE — 99284 EMERGENCY DEPT VISIT MOD MDM: CPT | Performed by: PHYSICIAN ASSISTANT

## 2022-10-05 PROCEDURE — 99283 EMERGENCY DEPT VISIT LOW MDM: CPT

## 2022-10-05 RX ORDER — INDOMETHACIN 50 MG/1
50 CAPSULE ORAL
Qty: 15 CAPSULE | Refills: 0 | Status: SHIPPED | OUTPATIENT
Start: 2022-10-05 | End: 2022-10-07

## 2022-10-05 RX ORDER — DEXAMETHASONE SODIUM PHOSPHATE 4 MG/ML
8 INJECTION, SOLUTION INTRA-ARTICULAR; INTRALESIONAL; INTRAMUSCULAR; INTRAVENOUS; SOFT TISSUE ONCE
Status: COMPLETED | OUTPATIENT
Start: 2022-10-05 | End: 2022-10-05

## 2022-10-05 RX ORDER — KETOROLAC TROMETHAMINE 30 MG/ML
15 INJECTION, SOLUTION INTRAMUSCULAR; INTRAVENOUS ONCE
Status: COMPLETED | OUTPATIENT
Start: 2022-10-05 | End: 2022-10-05

## 2022-10-05 RX ORDER — COLCHICINE 0.6 MG/1
0.6 TABLET ORAL 2 TIMES DAILY
Qty: 20 TABLET | Refills: 0 | Status: SHIPPED | OUTPATIENT
Start: 2022-10-05

## 2022-10-05 RX ORDER — ALLOPURINOL 100 MG/1
100 TABLET ORAL DAILY
Qty: 30 TABLET | Refills: 0 | Status: SHIPPED | OUTPATIENT
Start: 2022-10-05

## 2022-10-05 RX ADMIN — KETOROLAC TROMETHAMINE 15 MG: 30 INJECTION, SOLUTION INTRAMUSCULAR at 13:42

## 2022-10-05 RX ADMIN — DEXAMETHASONE SODIUM PHOSPHATE 8 MG: 4 INJECTION, SOLUTION INTRAMUSCULAR; INTRAVENOUS at 13:42

## 2022-10-05 NOTE — Clinical Note
Loan Crook was seen and treated in our emergency department on 10/5/2022  Diagnosis: gout    [place]    He may return on this date: 10/07/2022         If you have any questions or concerns, please don't hesitate to call        Jose Gan PA-C    ______________________________           _______________          _______________  Hospital Representative                              Date                                Time

## 2022-10-05 NOTE — DISCHARGE INSTRUCTIONS
1   your Indocin and colchicine of the pharmacy that her doctor called in today    2  Do not start the Indocin today we pulled tomorrow  You may start colchicine when you get home    3  Return with any worsening symptoms questions, or concerns    4   Follow-up with your family doctor for recheck this week or next

## 2022-10-05 NOTE — ED PROVIDER NOTES
History  Chief Complaint   Patient presents with    Foot Swelling     Pt c/o L foot/ankle swelling since lastnight  Pt has hx of gout and was told not to take medications  Pt states sugar is his trigger and had a lot of sweets this weekend     This is a 51-year-old male patient with ongoing acute on chronic gout  Normally takes allopurinol but stops and is now having more gouty flares  States over last weekend he did eat poorly which flared his left ankle  He called his family doctor who did call and Indocin and colchicine  The pain was so bad that he came here  I spoke with his PCP Dr Liya James and discussed case in detail as not to duplicate medications  He requested the patient receive a prescription from this department for allopurinol 100 mg a day and agreed with 15 mg of Toradol IM and 8 mg of dexamethasone IM to help the acute flare of gout  There has been no fever or chills or systemic symptoms  It is allodynic to the touch and very consistent with his chronic gouty flares  His family doctor early called in Indocin and colchicine for home  He will pick them up when he leaves the ER and was advised not to take Indocin for the rest of the day  Prior to Admission Medications   Prescriptions Last Dose Informant Patient Reported? Taking?    albuterol (PROVENTIL HFA,VENTOLIN HFA) 90 mcg/act inhaler  Self No No   Sig: Inhale 2 puffs every 6 (six) hours as needed for wheezing   allopurinol (ZYLOPRIM) 100 mg tablet   No No   Sig: Take 1 tablet (100 mg total) by mouth daily   allopurinol (ZYLOPRIM) 100 mg tablet   No Yes   Sig: Take 1 tablet (100 mg total) by mouth daily   colchicine (COLCRYS) 0 6 mg tablet   No No   Sig: Take 1 tablet (0 6 mg total) by mouth 2 (two) times a day   cyclobenzaprine (FLEXERIL) 10 mg tablet   No No   Sig: Take 1 tablet (10 mg total) by mouth 3 (three) times a day as needed for muscle spasms   diclofenac (VOLTAREN) 75 mg EC tablet   No No   Sig: TAKE 1 TABLET BY MOUTH TWICE A DAY   Patient not taking: Reported on 5/24/2022   fluticasone-salmeterol (Advair Diskus) 100-50 mcg/dose inhaler  Self No No   Sig: Inhale 1 puff 2 (two) times a day Rinse mouth after use  Patient not taking: No sig reported   indomethacin (INDOCIN) 50 mg capsule   No No   Sig: Take 1 capsule (50 mg total) by mouth 3 (three) times a day with meals for 5 days   omeprazole (PriLOSEC) 20 mg delayed release capsule  Self No No   Sig: TAKE 1 CAPSULE (20 MG TOTAL) BY MOUTH DAILY BEFORE BREAKFAST   Patient not taking: Reported on 5/24/2022   promethazine-codeine (PHENERGAN WITH CODEINE) 6 25-10 mg/5 mL syrup   No No   Sig: Take 5 mL by mouth every 6 (six) hours as needed for cough      Facility-Administered Medications: None       Past Medical History:   Diagnosis Date    Asthma     Asthmatic bronchitis     Body mass index 34 0-34 9, adult     Convulsions (HCC)     Diverticulitis     Diverticulosis     sigmoid colon - on colonoscopy 1/31/2011     Gastric ulcer     Gout     Left shoulder pain     Lumbar disc herniation with radiculopathy     Osteoarthritis     Pharyngitis     Pulmonary embolism (HCC) 08/15/2012    Right knee pain     Rotator cuff syndrome of left shoulder     Routine general medical examination at a health care facility     SOB (shortness of breath)     Spontaneous rupture of tendon of left shoulder     Syncope and collapse     Vestibular neuritis        Past Surgical History:   Procedure Laterality Date    COLON SURGERY  08/08/2012    Sigmoid colon removed for diverticulosis     EGD AND COLONOSCOPY  01/31/2011    Dr Magana         Family History   Problem Relation Age of Onset    Hypertension Mother     Hyperlipidemia Mother      I have reviewed and agree with the history as documented      E-Cigarette/Vaping    E-Cigarette Use Never User      E-Cigarette/Vaping Substances    Nicotine No     THC No     CBD No     Flavoring No     Other No     Unknown No      Social History     Tobacco Use    Smoking status: Never Smoker    Smokeless tobacco: Never Used   Vaping Use    Vaping Use: Never used   Substance Use Topics    Alcohol use: Not Currently    Drug use: Never       Review of Systems   Constitutional: Negative for chills, diaphoresis, fatigue and fever  HENT: Negative for congestion, ear pain, nosebleeds and sore throat  Eyes: Negative for photophobia, pain, discharge and visual disturbance  Respiratory: Negative for cough, choking, chest tightness, shortness of breath and wheezing  Cardiovascular: Negative for chest pain and palpitations  Gastrointestinal: Negative for abdominal distention, abdominal pain, diarrhea and vomiting  Genitourinary: Negative for dysuria, flank pain, frequency and hematuria  Musculoskeletal: Positive for gait problem (Left ankle pain)  Negative for arthralgias, back pain and joint swelling  Skin: Negative for color change and rash  Neurological: Negative for dizziness, seizures, syncope and headaches  Psychiatric/Behavioral: Negative for behavioral problems and confusion  The patient is not nervous/anxious  All other systems reviewed and are negative  Physical Exam  Physical Exam  Vitals and nursing note reviewed  Constitutional:       General: He is not in acute distress  Appearance: Normal appearance  He is well-developed  He is obese  He is not ill-appearing, toxic-appearing or diaphoretic  HENT:      Head: Normocephalic and atraumatic  Right Ear: Tympanic membrane, ear canal and external ear normal       Left Ear: Tympanic membrane, ear canal and external ear normal       Nose: Nose normal       Mouth/Throat:      Mouth: Mucous membranes are moist       Pharynx: Oropharynx is clear  No oropharyngeal exudate or posterior oropharyngeal erythema  Eyes:      General: No scleral icterus  Right eye: No discharge  Left eye: No discharge        Conjunctiva/sclera: Conjunctivae normal       Pupils: Pupils are equal, round, and reactive to light  Cardiovascular:      Rate and Rhythm: Normal rate and regular rhythm  Pulmonary:      Effort: Pulmonary effort is normal       Breath sounds: Normal breath sounds  Abdominal:      General: Bowel sounds are normal       Palpations: Abdomen is soft  Tenderness: There is no abdominal tenderness  Musculoskeletal:         General: Normal range of motion  Cervical back: Normal range of motion and neck supple  Right lower leg: No edema  Left lower leg: No edema  Feet:    Skin:     General: Skin is warm  Capillary Refill: Capillary refill takes less than 2 seconds  Neurological:      General: No focal deficit present  Mental Status: He is alert and oriented to person, place, and time  Mental status is at baseline     Psychiatric:         Mood and Affect: Mood normal          Behavior: Behavior normal          Vital Signs  ED Triage Vitals [10/05/22 1217]   Temperature Pulse Respirations Blood Pressure SpO2   98 4 °F (36 9 °C) 66 18 145/89 98 %      Temp Source Heart Rate Source Patient Position - Orthostatic VS BP Location FiO2 (%)   Oral Monitor Sitting Left arm --      Pain Score       --           Vitals:    10/05/22 1217   BP: 145/89   Pulse: 66   Patient Position - Orthostatic VS: Sitting         Visual Acuity      ED Medications  Medications   dexamethasone (DECADRON) injection 8 mg (8 mg Intramuscular Given 10/5/22 1342)   ketorolac (TORADOL) injection 15 mg (15 mg Intramuscular Given 10/5/22 1342)       Diagnostic Studies  Results Reviewed     None                 No orders to display              Procedures  Procedures         ED Course                                             MDM    Disposition  Final diagnoses:   Acute gout     Time reflects when diagnosis was documented in both MDM as applicable and the Disposition within this note     Time User Action Codes Description Comment 10/5/2022  1:37 PM Danyel Beard Add [M10 9] Acute gout     10/5/2022  1:37 PM Danyel Beard Add [M10 00] Idiopathic gout, unspecified chronicity, unspecified site     10/5/2022  1:37 PM Danyel Beard Modify [M10 00] Idiopathic gout, unspecified chronicity, unspecified site     10/5/2022  1:37 PM Danyel Beard Modify [M10 00] Idiopathic gout, unspecified chronicity, unspecified site       ED Disposition     ED Disposition   Discharge    Condition   Stable    Date/Time   Wed Oct 5, 2022  1:37 PM    Comment   Tanya Trevizo discharge to home/self care                 Follow-up Information     Follow up With Specialties Details Why Contact Info    Rain Mi MD Internal Medicine Schedule an appointment as soon as possible for a visit   [address] [place] [address]  [phone]            Discharge Medication List as of 10/5/2022  1:38 PM      CONTINUE these medications which have CHANGED    Details   allopurinol (ZYLOPRIM) 100 mg tablet Take 1 tablet (100 mg total) by mouth daily, Starting Wed 10/5/2022, Normal         CONTINUE these medications which have NOT CHANGED    Details   albuterol (PROVENTIL HFA,VENTOLIN HFA) 90 mcg/act inhaler Inhale 2 puffs every 6 (six) hours as needed for wheezing, Starting Wed 12/29/2021, Normal      colchicine (COLCRYS) 0 6 mg tablet Take 1 tablet (0 6 mg total) by mouth 2 (two) times a day, Starting Wed 10/5/2022, Normal      cyclobenzaprine (FLEXERIL) 10 mg tablet Take 1 tablet (10 mg total) by mouth 3 (three) times a day as needed for muscle spasms, Starting Mon 8/29/2022, Normal      diclofenac (VOLTAREN) 75 mg EC tablet TAKE 1 TABLET BY MOUTH TWICE A DAY, Normal      fluticasone-salmeterol (Advair Diskus) 100-50 mcg/dose inhaler Inhale 1 puff 2 (two) times a day Rinse mouth after use , Starting Wed 12/29/2021, Normal      indomethacin (INDOCIN) 50 mg capsule Take 1 capsule (50 mg total) by mouth 3 (three) times a day with meals for 5 days, Starting Wed 10/5/2022, Until Mon 10/10/2022, Normal      omeprazole (PriLOSEC) 20 mg delayed release capsule TAKE 1 CAPSULE (20 MG TOTAL) BY MOUTH DAILY BEFORE BREAKFAST, Starting Fri 2/19/2021, Normal      promethazine-codeine (PHENERGAN WITH CODEINE) 6 25-10 mg/5 mL syrup Take 5 mL by mouth every 6 (six) hours as needed for cough, Starting Tue 5/24/2022, Normal             No discharge procedures on file      PDMP Review       Value Time User    PDMP Reviewed  Yes 7/14/2022  2:53 AM Nell Montelongo MD          ED Provider  Electronically Signed by           Monse Roach PA-C  10/05/22 1942

## 2022-10-05 NOTE — TELEPHONE ENCOUNTER
Please let him know I faxed prescription to the pharmacy for colchicine and indomethacin, if he is not better in next 24 hours, he needs to be seen by me in the office

## 2022-10-07 ENCOUNTER — HOSPITAL ENCOUNTER (EMERGENCY)
Facility: HOSPITAL | Age: 52
Discharge: HOME | End: 2022-10-07
Attending: EMERGENCY MEDICINE
Payer: COMMERCIAL

## 2022-10-07 ENCOUNTER — TELEPHONE (OUTPATIENT)
Dept: INTERNAL MEDICINE CLINIC | Facility: CLINIC | Age: 52
End: 2022-10-07

## 2022-10-07 VITALS
RESPIRATION RATE: 20 BRPM | TEMPERATURE: 97.7 F | SYSTOLIC BLOOD PRESSURE: 115 MMHG | DIASTOLIC BLOOD PRESSURE: 61 MMHG | HEART RATE: 64 BPM | OXYGEN SATURATION: 97 %

## 2022-10-07 DIAGNOSIS — M10.00 ACUTE IDIOPATHIC GOUT, UNSPECIFIED SITE: ICD-10-CM

## 2022-10-07 DIAGNOSIS — M10.9 GOUT: ICD-10-CM

## 2022-10-07 DIAGNOSIS — M25.572 ACUTE LEFT ANKLE PAIN: Primary | ICD-10-CM

## 2022-10-07 LAB
ALBUMIN SERPL BCP-MCNC: 3.9 G/DL (ref 3.5–5)
ALL TARGETS: NOT DETECTED
ALP SERPL-CCNC: 66 U/L (ref 34–104)
ALT SERPL W P-5'-P-CCNC: 26 U/L (ref 7–52)
ANION GAP SERPL CALCULATED.3IONS-SCNC: 10 MMOL/L (ref 4–13)
AST SERPL W P-5'-P-CCNC: 16 U/L (ref 13–39)
BASOPHILS # BLD AUTO: 0.04 THOUSANDS/ΜL (ref 0–0.1)
BASOPHILS NFR BLD AUTO: 0 % (ref 0–1)
BILIRUB SERPL-MCNC: 0.49 MG/DL (ref 0.2–1)
BILIRUB UR QL STRIP: NEGATIVE
BUN SERPL-MCNC: 20 MG/DL (ref 5–25)
CALCIUM SERPL-MCNC: 8.7 MG/DL (ref 8.4–10.2)
CHLORIDE SERPL-SCNC: 105 MMOL/L (ref 96–108)
CLARITY UR: CLEAR
CO2 SERPL-SCNC: 27 MMOL/L (ref 21–32)
COLOR UR: NORMAL
CREAT SERPL-MCNC: 0.92 MG/DL (ref 0.6–1.3)
CRP SERPL QL: 11.9 MG/L
EOSINOPHIL # BLD AUTO: 0.2 THOUSAND/ΜL (ref 0–0.61)
EOSINOPHIL NFR BLD AUTO: 2 % (ref 0–6)
ERYTHROCYTE [DISTWIDTH] IN BLOOD BY AUTOMATED COUNT: 13 % (ref 11.6–15.1)
ERYTHROCYTE [SEDIMENTATION RATE] IN BLOOD: 19 MM/HOUR (ref 0–19)
GFR SERPL CREATININE-BSD FRML MDRD: 95 ML/MIN/1.73SQ M
GLUCOSE SERPL-MCNC: 107 MG/DL (ref 65–140)
GLUCOSE UR STRIP-MCNC: NEGATIVE MG/DL
GRAM STN SPEC: NORMAL
HCT VFR BLD AUTO: 44.1 % (ref 36.5–49.3)
HGB BLD-MCNC: 14.5 G/DL (ref 12–17)
HGB UR QL STRIP.AUTO: NEGATIVE
IMM GRANULOCYTES # BLD AUTO: 0.1 THOUSAND/UL (ref 0–0.2)
IMM GRANULOCYTES NFR BLD AUTO: 1 % (ref 0–2)
KETONES UR STRIP-MCNC: NEGATIVE MG/DL
LEUKOCYTE ESTERASE UR QL STRIP: NEGATIVE
LYMPHOCYTES # BLD AUTO: 3.48 THOUSANDS/ΜL (ref 0.6–4.47)
LYMPHOCYTES NFR BLD AUTO: 29 % (ref 14–44)
MCH RBC QN AUTO: 29.2 PG (ref 26.8–34.3)
MCHC RBC AUTO-ENTMCNC: 32.9 G/DL (ref 31.4–37.4)
MCV RBC AUTO: 89 FL (ref 82–98)
MONOCYTES # BLD AUTO: 1.18 THOUSAND/ΜL (ref 0.17–1.22)
MONOCYTES NFR BLD AUTO: 10 % (ref 4–12)
NEUTROPHILS # BLD AUTO: 7.07 THOUSANDS/ΜL (ref 1.85–7.62)
NEUTS SEG NFR BLD AUTO: 58 % (ref 43–75)
NITRITE UR QL STRIP: NEGATIVE
NRBC BLD AUTO-RTO: 0 /100 WBCS
PH UR STRIP.AUTO: 5.5 [PH]
PLATELET # BLD AUTO: 262 THOUSANDS/UL (ref 149–390)
PMV BLD AUTO: 8.9 FL (ref 8.9–12.7)
POTASSIUM SERPL-SCNC: 4 MMOL/L (ref 3.5–5.3)
PROCALCITONIN SERPL-MCNC: 0.06 NG/ML
PROT SERPL-MCNC: 6.8 G/DL (ref 6.4–8.4)
PROT UR STRIP-MCNC: NEGATIVE MG/DL
RBC # BLD AUTO: 4.96 MILLION/UL (ref 3.88–5.62)
SODIUM SERPL-SCNC: 142 MMOL/L (ref 135–147)
SP GR UR STRIP.AUTO: 1.03 (ref 1–1.03)
UROBILINOGEN UR STRIP-ACNC: <2 MG/DL
WBC # BLD AUTO: 12.07 THOUSAND/UL (ref 4.31–10.16)

## 2022-10-07 PROCEDURE — 87181 SC STD AGAR DILUTION PER AGT: CPT

## 2022-10-07 PROCEDURE — 86618 LYME DISEASE ANTIBODY: CPT

## 2022-10-07 PROCEDURE — 81003 URINALYSIS AUTO W/O SCOPE: CPT | Performed by: PHYSICIAN ASSISTANT

## 2022-10-07 PROCEDURE — 87491 CHLMYD TRACH DNA AMP PROBE: CPT | Performed by: PHYSICIAN ASSISTANT

## 2022-10-07 PROCEDURE — 87205 SMEAR GRAM STAIN: CPT

## 2022-10-07 PROCEDURE — 87591 N.GONORRHOEAE DNA AMP PROB: CPT | Performed by: PHYSICIAN ASSISTANT

## 2022-10-07 PROCEDURE — 84145 PROCALCITONIN (PCT): CPT

## 2022-10-07 PROCEDURE — 96365 THER/PROPH/DIAG IV INF INIT: CPT

## 2022-10-07 PROCEDURE — 87040 BLOOD CULTURE FOR BACTERIA: CPT

## 2022-10-07 PROCEDURE — 96366 THER/PROPH/DIAG IV INF ADDON: CPT

## 2022-10-07 PROCEDURE — 96375 TX/PRO/DX INJ NEW DRUG ADDON: CPT

## 2022-10-07 PROCEDURE — 99283 EMERGENCY DEPT VISIT LOW MDM: CPT

## 2022-10-07 PROCEDURE — 99285 EMERGENCY DEPT VISIT HI MDM: CPT

## 2022-10-07 PROCEDURE — 86140 C-REACTIVE PROTEIN: CPT

## 2022-10-07 PROCEDURE — 80053 COMPREHEN METABOLIC PANEL: CPT

## 2022-10-07 PROCEDURE — 85652 RBC SED RATE AUTOMATED: CPT

## 2022-10-07 PROCEDURE — 20605 DRAIN/INJ JOINT/BURSA W/O US: CPT

## 2022-10-07 PROCEDURE — 87154 CUL TYP ID BLD PTHGN 6+ TRGT: CPT

## 2022-10-07 PROCEDURE — 99204 OFFICE O/P NEW MOD 45 MIN: CPT | Performed by: PHYSICIAN ASSISTANT

## 2022-10-07 PROCEDURE — 87076 CULTURE ANAEROBE IDENT EACH: CPT

## 2022-10-07 PROCEDURE — 96376 TX/PRO/DX INJ SAME DRUG ADON: CPT

## 2022-10-07 PROCEDURE — 87070 CULTURE OTHR SPECIMN AEROBIC: CPT

## 2022-10-07 PROCEDURE — 85025 COMPLETE CBC W/AUTO DIFF WBC: CPT

## 2022-10-07 PROCEDURE — 36415 COLL VENOUS BLD VENIPUNCTURE: CPT

## 2022-10-07 RX ORDER — DICLOFENAC SODIUM 75 MG/1
75 TABLET, DELAYED RELEASE ORAL 2 TIMES DAILY
Qty: 30 TABLET | Refills: 0 | Status: SHIPPED | OUTPATIENT
Start: 2022-10-07

## 2022-10-07 RX ORDER — OXYCODONE HYDROCHLORIDE AND ACETAMINOPHEN 5; 325 MG/1; MG/1
1 TABLET ORAL EVERY 6 HOURS PRN
Qty: 12 TABLET | Refills: 0 | Status: SHIPPED | OUTPATIENT
Start: 2022-10-07 | End: 2022-10-19

## 2022-10-07 RX ORDER — MORPHINE SULFATE 4 MG/ML
4 INJECTION, SOLUTION INTRAMUSCULAR; INTRAVENOUS ONCE
Status: COMPLETED | OUTPATIENT
Start: 2022-10-07 | End: 2022-10-07

## 2022-10-07 RX ORDER — KETOROLAC TROMETHAMINE 30 MG/ML
15 INJECTION, SOLUTION INTRAMUSCULAR; INTRAVENOUS ONCE
Status: COMPLETED | OUTPATIENT
Start: 2022-10-07 | End: 2022-10-07

## 2022-10-07 RX ORDER — LIDOCAINE HYDROCHLORIDE 10 MG/ML
10 INJECTION, SOLUTION EPIDURAL; INFILTRATION; INTRACAUDAL; PERINEURAL ONCE
Status: COMPLETED | OUTPATIENT
Start: 2022-10-07 | End: 2022-10-07

## 2022-10-07 RX ADMIN — MORPHINE SULFATE 4 MG: 4 INJECTION INTRAVENOUS at 07:59

## 2022-10-07 RX ADMIN — LIDOCAINE HYDROCHLORIDE 10 ML: 10 INJECTION, SOLUTION EPIDURAL; INFILTRATION; INTRACAUDAL; PERINEURAL at 06:56

## 2022-10-07 RX ADMIN — VANCOMYCIN HYDROCHLORIDE 2000 MG: 5 INJECTION, POWDER, LYOPHILIZED, FOR SOLUTION INTRAVENOUS at 08:44

## 2022-10-07 RX ADMIN — KETOROLAC TROMETHAMINE 15 MG: 30 INJECTION, SOLUTION INTRAMUSCULAR at 06:55

## 2022-10-07 RX ADMIN — MORPHINE SULFATE 4 MG: 4 INJECTION INTRAVENOUS at 06:12

## 2022-10-07 NOTE — ED CARE HANDOFF
Emergency Department Sign Out Note        Sign out and transfer of care from [facility]  See Separate Emergency Department note  The patient, Georgi Arana, was evaluated by the previous provider for red painful ankle/Gout flare  Workup Completed:  Cbc & diff, CMP, Lyme, Joint aspiration- 3 ml clear blood tinged  Riley Regalado Specimen sent to the lab for a Gram stain and CNS of the fluid  There was not enough to do a white cell count, crystals  Patient was medicated for pain with morphine  He has been taking  Indocin, Colchicine, and Allopurinol at home with no relief of his symptoms    ED Course / Workup Pending (followup):  Gram stain pending  Patient received vancomycin intravenously  Physical exam tax patient is alert and oriented x3  He is in no acute distress  Inspection of his left ankle-there is edema present  There is mild erythema over the lateral aspect  There is no warmth to palpation  It is tender upon palpation  I do not see an effusion  Patient has tenderness with range of motion  There are palpable pulses over the dorsalis pedis and posterior tibial arteries  Capillary refills less than 2 seconds  Plan-awaiting  g stain results  Most likely this is gout and not a septic arthritis  Patient was seen and evaluated by orthopedics who also agrees that this is gout and not a septic joint  The Gram stain returned with few polys and no bacteria seen  The patient was discharged home and was instructed to continue with his Indocin, colchicine, allopurinol  I did write for 12 tablets of Percocet, 1 tablet every 6 hours as needed for pain  He is going to follow a gout diet  He is going to follow up with his family physician for repeat exam on Monday  He was given reasons to return to the emergency room should his symptoms worsen  ED Course as of 10/07/22 1649   Fri Oct 07, 2022   1218 G stain demonstrates no organisms rare polys and RBCs    Will check with Ortho to see what their opinion is prior to discharge     Procedures  MDM        Disposition  Final diagnoses:   None     ED Disposition     None      Follow-up Information    None       Patient's Medications   Discharge Prescriptions    No medications on file     No discharge procedures on file         ED Provider  Electronically Signed by     Yaquelin Sanders PA-C  10/07/22 9198

## 2022-10-07 NOTE — DISCHARGE INSTRUCTIONS
Continue your Indocin, colchicine, allopurinol  I wrote for some Percocet, 1 pill every 6 hours as needed for pain  Follow up with family physician in the next 3-5 days

## 2022-10-07 NOTE — CONSULTS
Orthopedics   Kem Graves 46 y o  male MRN: [id number]  Unit/Bed#: ED-31      Chief Complaint:   left ankle pain    HPI:  46 y o male community ambulator complaining of left ankle pain  He notes he has a history of gout, and has several flare ups a year, typically 3-4 per year  He most recently was seen for a flare up in the ER about 2 days ago, and was given anti inflammatories  He reports today that his ankle pain is improved, but still significant  As an outpatient, he takes colchicine, allopurinol and indomethacin  He feels that his gout flare ups are more frequent recently, which he is unsure what to contribute this to  Orthopedics consulted to ensure/rule out possible septic joint given level of pain       Review Of Systems:   · Skin: Normal  · Neuro: See HPI  · Musculoskeletal: See HPI  · 14 point review of systems negative except as stated above     Past Medical History:   Past Medical History:   Diagnosis Date    Asthma     Asthmatic bronchitis     Body mass index 34 0-34 9, adult     Convulsions (HCC)     Diverticulitis     Diverticulosis     sigmoid colon - on colonoscopy 1/31/2011     Gastric ulcer     Gout     Left shoulder pain     Lumbar disc herniation with radiculopathy     Osteoarthritis     Pharyngitis     Pulmonary embolism (HCC) 08/15/2012    Right knee pain     Rotator cuff syndrome of left shoulder     Routine general medical examination at a health care facility     SOB (shortness of breath)     Spontaneous rupture of tendon of left shoulder     Syncope and collapse     Vestibular neuritis        Past Surgical History:   Past Surgical History:   Procedure Laterality Date    COLON SURGERY  08/08/2012    Sigmoid colon removed for diverticulosis     EGD AND COLONOSCOPY  01/31/2011    Dr Cassy Rivera         Family History:  Family history reviewed and non-contributory  Family History   Problem Relation Age of Onset    Hypertension Mother    Gerald Palomares Hyperlipidemia Mother        Social History:  Social History     Socioeconomic History    Marital status: /Civil Union     Spouse name: None    Number of children: None    Years of education: None    Highest education level: None   Occupational History    None   Tobacco Use    Smoking status: Never Smoker    Smokeless tobacco: Never Used   Vaping Use    Vaping Use: Never used   Substance and Sexual Activity    Alcohol use: Not Currently    Drug use: Never    Sexual activity: None   Other Topics Concern    None   Social History Narrative    None     Social Determinants of Health     Financial Resource Strain: Not on file   Food Insecurity: Not on file   Transportation Needs: Not on file   Physical Activity: Not on file   Stress: Not on file   Social Connections: Not on file   Intimate Partner Violence: Not on file   Housing Stability: Not on file       Allergies: Allergies   Allergen Reactions    Cefazolin Hives    Metronidazole Hives    Levofloxacin Palpitations           Labs:  0   Lab Value Date/Time    HCT 44 1 10/07/2022 0614    HCT 47 7 05/21/2021 0951    HGB 14 5 10/07/2022 0614    HGB 15 7 05/21/2021 0951    WBC 12 07 (H) 10/07/2022 0614    WBC 9 26 05/21/2021 0951    ESR 19 10/07/2022 0614    CRP 11 9 (H) 10/07/2022 0304       Meds:  No current facility-administered medications for this encounter      Current Outpatient Medications:     albuterol (PROVENTIL HFA,VENTOLIN HFA) 90 mcg/act inhaler, Inhale 2 puffs every 6 (six) hours as needed for wheezing, Disp: 8 g, Rfl: 1    allopurinol (ZYLOPRIM) 100 mg tablet, Take 1 tablet (100 mg total) by mouth daily, Disp: 30 tablet, Rfl: 0    colchicine (COLCRYS) 0 6 mg tablet, Take 1 tablet (0 6 mg total) by mouth 2 (two) times a day, Disp: 20 tablet, Rfl: 0    cyclobenzaprine (FLEXERIL) 10 mg tablet, Take 1 tablet (10 mg total) by mouth 3 (three) times a day as needed for muscle spasms, Disp: 15 tablet, Rfl: 0    diclofenac (VOLTAREN) 75 mg EC tablet, TAKE 1 TABLET BY MOUTH TWICE A DAY (Patient not taking: Reported on 5/24/2022), Disp: 60 tablet, Rfl: 0    fluticasone-salmeterol (Advair Diskus) 100-50 mcg/dose inhaler, Inhale 1 puff 2 (two) times a day Rinse mouth after use  (Patient not taking: No sig reported), Disp: 60 blister, Rfl: 1    indomethacin (INDOCIN) 50 mg capsule, Take 1 capsule (50 mg total) by mouth 3 (three) times a day with meals for 5 days, Disp: 15 capsule, Rfl: 0    omeprazole (PriLOSEC) 20 mg delayed release capsule, TAKE 1 CAPSULE (20 MG TOTAL) BY MOUTH DAILY BEFORE BREAKFAST (Patient not taking: Reported on 5/24/2022), Disp: 90 capsule, Rfl: 1    promethazine-codeine (PHENERGAN WITH CODEINE) 6 25-10 mg/5 mL syrup, Take 5 mL by mouth every 6 (six) hours as needed for cough, Disp: 150 mL, Rfl: 0    Blood Culture:   Lab Results   Component Value Date    BLOODCX Received in Microbiology Lab  Culture in Progress  10/07/2022       Wound Culture:   No results found for: WOUNDCULT    Ins and Outs:  No intake/output data recorded  Physical Exam:   /57   Pulse 64   Temp 97 7 °F (36 5 °C) (Oral)   Resp 20   SpO2 97%   Gen: No acute distress, resting comfortably in bed  HEENT: Eyes clear, moist mucus membranes, hearing intact  Respiratory: No audible wheezing or stridor  Cardiovascular: Well Perfused peripherally, 2+ distal pulse  Abdomen: nondistended, no peritoneal signs  Musculoskeletal: left lower extremity  · Skin intact  · He is generally tender about the ankle  · He is able to plantar and dorsiflex without significant discomfort  · Positive knee flexion/extension  · 2+ DP and PT pulses  · Leg lengths equal  · Musculature is soft and compressible, no pain with passive stretch    Radiology:   I personally reviewed the films    There is no updated imaging to review today at time of visit/consultation      _*_*_*_*_*_*_*_*_*_*_*_*_*_*_*_*_*_*_*_*_*_*_*_*_*_*_*_*_*_*_*_*_*_*_*_*_*_*_*_*_*    Assessment:  52 y o male who presents today for evaluation of left ankle pain, suspected gout flare  Gram stain from aspirate sent by ER revealing no organisms, 4+ RBC and rare polys  Plan:   · Weight bearing as tolerated to the left lower extremity  · Recommend continued colchicine, allopurinol  · Analgesics for pain, recommend NSAIDs  · Further management of suspected gout flare per primary team    · Do not suspect septic joint  · No immediate orthopedic intervention planned  · Orthopedics will sign off at time time  · Please call with questions or concerns  · Patient to follow up with PCP upon discharge       Alexandria John PA-C

## 2022-10-07 NOTE — TELEPHONE ENCOUNTER
patient would like a call back his gout got worse he was in er at [address] [place] [address] for 7 hrs they ran a bunch of tests he would like to know if you can see them and go over them with him

## 2022-10-07 NOTE — ED PROVIDER NOTES
History  Chief Complaint   Patient presents with    Gout Pain     Pt has gout in L foot, in pain, was seen here Wednesday for same issue      Patient is a 40-year-old male with significant PMH of gout presenting for evaluation of 4 days of left ankle pain  He notes a history of gout and notes "it use to affect my left foot and now it always goes to my left ankle"  He notes he developed mild pain Tuesday evening which progressed with worsening pain and swelling for which he presented to the ED for evaluation  He was given 8 mg of Decadron and 50 mg of Toradol at that time and discharged home with new prescription for allopurinol  He notes the pain has become more severe and he is unable to ambulate on it  He denies associated fevers and chills  He notes the left lateral ankle is more pink and feels warm to touch  He has no calf pain bilaterally  He denies pain to proximal knee and hip  He denies any injury to the ankle  He has been taking allopurinol, colchicine, and indomethacin without relief  The patient denies other complaints at this time  History provided by:  Patient   used: No        Prior to Admission Medications   Prescriptions Last Dose Informant Patient Reported? Taking?    albuterol (PROVENTIL HFA,VENTOLIN HFA) 90 mcg/act inhaler  Self No No   Sig: Inhale 2 puffs every 6 (six) hours as needed for wheezing   allopurinol (ZYLOPRIM) 100 mg tablet   No No   Sig: Take 1 tablet (100 mg total) by mouth daily   colchicine (COLCRYS) 0 6 mg tablet   No No   Sig: Take 1 tablet (0 6 mg total) by mouth 2 (two) times a day   cyclobenzaprine (FLEXERIL) 10 mg tablet   No No   Sig: Take 1 tablet (10 mg total) by mouth 3 (three) times a day as needed for muscle spasms   diclofenac (VOLTAREN) 75 mg EC tablet   No No   Sig: TAKE 1 TABLET BY MOUTH TWICE A DAY   Patient not taking: Reported on 5/24/2022   fluticasone-salmeterol (Advair Diskus) 100-50 mcg/dose inhaler  Self No No   Sig: Inhale 1 puff 2 (two) times a day Rinse mouth after use  Patient not taking: No sig reported   indomethacin (INDOCIN) 50 mg capsule   No No   Sig: Take 1 capsule (50 mg total) by mouth 3 (three) times a day with meals for 5 days   omeprazole (PriLOSEC) 20 mg delayed release capsule  Self No No   Sig: TAKE 1 CAPSULE (20 MG TOTAL) BY MOUTH DAILY BEFORE BREAKFAST   Patient not taking: Reported on 5/24/2022   promethazine-codeine (PHENERGAN WITH CODEINE) 6 25-10 mg/5 mL syrup   No No   Sig: Take 5 mL by mouth every 6 (six) hours as needed for cough      Facility-Administered Medications: None       Past Medical History:   Diagnosis Date    Asthma     Asthmatic bronchitis     Body mass index 34 0-34 9, adult     Convulsions (HCC)     Diverticulitis     Diverticulosis     sigmoid colon - on colonoscopy 1/31/2011     Gastric ulcer     Gout     Left shoulder pain     Lumbar disc herniation with radiculopathy     Osteoarthritis     Pharyngitis     Pulmonary embolism (HCC) 08/15/2012    Right knee pain     Rotator cuff syndrome of left shoulder     Routine general medical examination at a health care facility     SOB (shortness of breath)     Spontaneous rupture of tendon of left shoulder     Syncope and collapse     Vestibular neuritis        Past Surgical History:   Procedure Laterality Date    COLON SURGERY  08/08/2012    Sigmoid colon removed for diverticulosis     EGD AND COLONOSCOPY  01/31/2011    Dr Cindy Grant         Family History   Problem Relation Age of Onset    Hypertension Mother     Hyperlipidemia Mother      I have reviewed and agree with the history as documented      E-Cigarette/Vaping    E-Cigarette Use Never User      E-Cigarette/Vaping Substances    Nicotine No     THC No     CBD No     Flavoring No     Other No     Unknown No      Social History     Tobacco Use    Smoking status: Never Smoker    Smokeless tobacco: Never Used   Vaping Use    Vaping Use: Never used   Substance Use Topics    Alcohol use: Not Currently    Drug use: Never       Review of Systems   Constitutional: Negative for chills and fever  Musculoskeletal: Positive for arthralgias (Left ankle pain), gait problem (Due to left ankle being) and joint swelling (Left lateral ankle)  Negative for back pain  Skin: Positive for color change (Pinkness to left lateral ankle)  Negative for rash and wound  Allergic/Immunologic: Negative for immunocompromised state  All other systems reviewed and are negative  Physical Exam  Physical Exam  Vitals and nursing note reviewed  Constitutional:       General: He is in acute distress  Appearance: Normal appearance  He is well-developed  He is obese  He is not ill-appearing, toxic-appearing or diaphoretic  HENT:      Head: Normocephalic and atraumatic  Nose: Nose normal       Mouth/Throat:      Lips: Pink  No lesions  Mouth: Mucous membranes are moist       Pharynx: Oropharynx is clear  Eyes:      Extraocular Movements: Extraocular movements intact  Conjunctiva/sclera: Conjunctivae normal    Neck:      Trachea: Trachea and phonation normal  No abnormal tracheal secretions  Cardiovascular:      Rate and Rhythm: Normal rate  Pulses:           Radial pulses are 2+ on the right side and 2+ on the left side  Dorsalis pedis pulses are 1+ on the right side and 1+ on the left side  Posterior tibial pulses are 1+ on the right side and 1+ on the left side  Heart sounds: Normal heart sounds, S1 normal and S2 normal  No murmur heard  Pulmonary:      Effort: Pulmonary effort is normal  No tachypnea or respiratory distress  Breath sounds: Normal breath sounds and air entry  No stridor, decreased air movement or transmitted upper airway sounds  No decreased breath sounds  Musculoskeletal:      Cervical back: Normal range of motion and neck supple  Right knee: No swelling or bony tenderness   Normal range of motion  Left knee: No swelling or bony tenderness  Normal range of motion  Right lower leg: No edema  Left lower leg: No edema  Right ankle: No swelling  Normal range of motion  Normal pulse  Left ankle: Swelling (Lateral malleolus) present  Tenderness present over the lateral malleolus and medial malleolus  Decreased range of motion  Normal pulse  Right foot: Normal range of motion and normal capillary refill  No swelling or bony tenderness  Normal pulse  Left foot: Normal range of motion and normal capillary refill  No swelling or bony tenderness  Normal pulse  Skin:     General: Skin is warm and dry  Capillary Refill: Capillary refill takes less than 2 seconds  Neurological:      General: No focal deficit present  Mental Status: He is alert and oriented to person, place, and time  Mental status is at baseline           Vital Signs  ED Triage Vitals [10/07/22 0456]   Temperature Pulse Respirations Blood Pressure SpO2   97 7 °F (36 5 °C) 59 20 161/93 98 %      Temp Source Heart Rate Source Patient Position - Orthostatic VS BP Location FiO2 (%)   Oral Monitor Lying Right arm --      Pain Score       10 - Worst Possible Pain           Vitals:    10/07/22 0456 10/07/22 0530 10/07/22 0615 10/07/22 0700   BP: 161/93 160/74 143/86 129/77   Pulse: 59 64     Patient Position - Orthostatic VS: Lying            Visual Acuity      ED Medications  Medications   vancomycin (VANCOCIN) 2,000 mg in sodium chloride 0 9 % 500 mL IVPB (has no administration in time range)   lidocaine (PF) (XYLOCAINE-MPF) 1 % injection 10 mL (10 mL Infiltration Given 10/7/22 0656)   morphine injection 4 mg (4 mg Intravenous Given 10/7/22 0612)   ketorolac (TORADOL) injection 15 mg (15 mg Intravenous Given 10/7/22 0655)   morphine injection 4 mg (4 mg Intravenous Given 10/7/22 1129)       Diagnostic Studies  Results Reviewed     Procedure Component Value Units Date/Time    Lyme Total Antibody Profile with reflex to [facility] [[id number]]     Lab Status: No result Specimen: Blood     Sedimentation rate, automated [[id number]]  (Normal) Collected: 10/07/22 0614    Lab Status: Final result Specimen: Blood from Arm, Right Updated: 10/07/22 0711     Sed Rate 19 mm/hour     Blood culture #1 [[id number]] Collected: 10/07/22 0653    Lab Status: In process Specimen: Blood from Arm, Right Updated: 10/07/22 0707    Body fluid culture and Gram stain [[id number]] Collected: 10/07/22 0649    Lab Status: In process Specimen: Body Fluid from Other Updated: 10/07/22 0659    STAT Gram Stain [[id number]] Collected: 10/07/22 0649    Lab Status:  In process Specimen: Other Updated: 10/07/22 0659    Procalcitonin [[id number]]  (Normal) Collected: 10/07/22 0614    Lab Status: Final result Specimen: Blood from Arm, Right Updated: 10/07/22 0656     Procalcitonin 0 06 ng/ml     CBC and differential [[id number]]  (Abnormal) Collected: 10/07/22 0614    Lab Status: Final result Specimen: Blood from Arm, Right Updated: 10/07/22 0655     WBC 12 07 Thousand/uL      RBC 4 96 Million/uL      Hemoglobin 14 5 g/dL      Hematocrit 44 1 %      MCV 89 fL      MCH 29 2 pg      MCHC 32 9 g/dL      RDW 13 0 %      MPV 8 9 fL      Platelets 843 Thousands/uL      nRBC 0 /100 WBCs      Neutrophils Relative 58 %      Immat GRANS % 1 %      Lymphocytes Relative 29 %      Monocytes Relative 10 %      Eosinophils Relative 2 %      Basophils Relative 0 %      Neutrophils Absolute 7 07 Thousands/µL      Immature Grans Absolute 0 10 Thousand/uL      Lymphocytes Absolute 3 48 Thousands/µL      Monocytes Absolute 1 18 Thousand/µL      Eosinophils Absolute 0 20 Thousand/µL      Basophils Absolute 0 04 Thousands/µL     Comprehensive metabolic panel [[id number]] Collected: 10/07/22 0614    Lab Status: Final result Specimen: Blood from Arm, Right Updated: 10/07/22 9392     Sodium 142 mmol/L      Potassium 4 0 mmol/L      Chloride 105 mmol/L      CO2 27 mmol/L      ANION GAP 10 mmol/L BUN 20 mg/dL      Creatinine 0 92 mg/dL      Glucose 107 mg/dL      Calcium 8 7 mg/dL      AST 16 U/L      ALT 26 U/L      Alkaline Phosphatase 66 U/L      Total Protein 6 8 g/dL      Albumin 3 9 g/dL      Total Bilirubin 0 49 mg/dL      eGFR 95 ml/min/1 73sq m     Narrative:      [place] guidelines for Chronic Kidney Disease (CKD):     Stage 1 with normal or high GFR (GFR > 90 mL/min/1 73 square meters)    Stage 2 Mild CKD (GFR = 60-89 mL/min/1 73 square meters)    Stage 3A Moderate CKD (GFR = 45-59 mL/min/1 73 square meters)    Stage 3B Moderate CKD (GFR = 30-44 mL/min/1 73 square meters)    Stage 4 Severe CKD (GFR = 15-29 mL/min/1 73 square meters)    Stage 5 End Stage CKD (GFR <15 mL/min/1 73 square meters)  Note: GFR calculation is accurate only with a steady state creatinine    C-reactive protein [[id number]]  (Abnormal) Collected: 10/07/22 0614    Lab Status: Final result Specimen: Blood from Arm, Right Updated: 10/07/22 0642     CRP 11 9 mg/L     Blood culture #2 [[id number]] Collected: 10/07/22 0614    Lab Status: In process Specimen: Blood from Arm, Right Updated: 10/07/22 0619                 No orders to display              Procedures  Arthrocentesis    Date/Time: 10/7/2022 6:39 AM  Performed by: Nelia Zaman   Authorized by: TERESA Zaman     Location:  Bedside and ED  Verbal consent obtained?: Yes    Risks and benefits: Risks, benefits and alternatives were discussed    Consent given by:  Patient  Patient states understanding of procedure being performed: Yes    Site marked: Yes    Patient identity confirmed:  Verbally with patient and arm band  Time out: Immediately prior to the procedure a time out was called    Indications:  Possible septic joint and therapeutic  Body area:   Ankle  Joint:  Left ankle  Local anesthesia used?: Yes    Anesthesia:  Local infiltration  Local anesthetic:  Lidocaine 1% without epinephrine  Preparation: Patient was prepped and draped in usual sterile fashion    Needle size:  20 G  Ultrasound guidance: No    Equipment Used:  20g spinal needle, 5cc syringe  Approach:  Lateral  Aspirate amount (ml):  3  Aspirate:  Bloody and serous  Lidocaine 1% amount (ml):  5  Patient tolerance:  Patient tolerated the procedure well with no immediate complications             ED Course  ED Course as of 10/07/22 0804   Mariano Knight Oct 07, 2022   0636 Arthro   0656 WBC(!): 12 07  Mild leukocytosis   0656 C-REACTIVE PROTEIN(!): 11 9  Elevated CRP   0656 Procalcitonin: 0 06  Negative making septic arthritis less likely   0657 Comprehensive metabolic panel  No metabolic abnormality, no WADE, no transaminitis   0715 Sedimentation rate, automated  Within defined limits   0731 On reassessment, patient notes pain 7/10  Will plan for 2nd dose of morphine while awaiting synovial fluid results  Patient updated on CMP, CBC, procalcitonin, ESR, and CRP    0802 Pt signed out to onctootie Locke  Plan made to send lyme titer and administer 2g Vancomycin as pt is allergic to ancef  W/u pending synovial fluid results  Likely dc to home pending negative septic arthritis work up  Plan to continue NSAID therapy with close f/u with family medicine provider for re-evaluation in the next week  SBIRT 20yo+    Flowsheet Row Most Recent Value   SBIRT (25 yo +)    In order to provide better care to our patients, we are screening all of our patients for alcohol and drug use  Would it be okay to ask you these screening questions? No Filed at: 10/07/2022 0459                    MDM  Number of Diagnoses or Management Options  Diagnosis management comments: See ED course for MDM discussion and disposition discussion          Amount and/or Complexity of Data Reviewed  Clinical lab tests: ordered and reviewed  Decide to obtain previous medical records or to obtain history from someone other than the patient: yes  Review and summarize past medical records: yes  Discuss the patient with other providers: yes (Dr Reilly [facility])    Patient Progress  Patient progress: stable      Disposition  Final diagnoses:   None     ED Disposition     None      Follow-up Information    None         Patient's Medications   Discharge Prescriptions    No medications on file       No discharge procedures on file      PDMP Review       Value Time User    PDMP Reviewed  Yes 7/14/2022  2:53 AM Darion Gregorio MD          ED Provider  Electronically Signed by           TERESA Suresh  10/07/22 2627

## 2022-10-08 LAB
B BURGDOR IGG+IGM SER-ACNC: <0.2 AI
C TRACH DNA SPEC QL NAA+PROBE: NEGATIVE
N GONORRHOEA DNA SPEC QL NAA+PROBE: NEGATIVE

## 2022-10-09 LAB
BACTERIA BLD CULT: NORMAL
BACTERIA SPEC BFLD CULT: NO GROWTH
GRAM STN SPEC: ABNORMAL
GRAM STN SPEC: NORMAL
GRAM STN SPEC: NORMAL

## 2022-10-10 LAB
BACTERIA SPEC BFLD CULT: NO GROWTH
GRAM STN SPEC: NORMAL
GRAM STN SPEC: NORMAL

## 2022-10-11 PROBLEM — R05.9 COUGH: Status: RESOLVED | Noted: 2022-05-24 | Resolved: 2022-10-11

## 2022-10-11 LAB
ALL TARGETS: NOT DETECTED
BACTERIA BLD CULT: ABNORMAL
GRAM STN SPEC: ABNORMAL

## 2022-10-12 LAB — BACTERIA BLD CULT: NORMAL

## 2022-12-13 ENCOUNTER — TELEPHONE (OUTPATIENT)
Dept: INTERNAL MEDICINE CLINIC | Facility: CLINIC | Age: 52
End: 2022-12-13

## 2022-12-13 NOTE — TELEPHONE ENCOUNTER
Patient called and his whole left leg does not move  Has history of tendinitis  Very painful  Taken Diclofenac for the past two days  His leg is inflame, knee and upper leg, left side

## 2022-12-14 ENCOUNTER — TELEPHONE (OUTPATIENT)
Dept: INTERNAL MEDICINE CLINIC | Facility: OTHER | Age: 52
End: 2022-12-14

## 2022-12-14 ENCOUNTER — TELEPHONE (OUTPATIENT)
Dept: INTERNAL MEDICINE CLINIC | Facility: CLINIC | Age: 52
End: 2022-12-14

## 2022-12-14 NOTE — TELEPHONE ENCOUNTER
Discussed with him, advised him to take 2 pills now and 2 pill at 10 PM, and starting tomorrow 1 pill 3 times a day have him keep the appointment

## 2022-12-14 NOTE — TELEPHONE ENCOUNTER
Patient has a question about his Oxycodone 5mg  Marcus Mabank Previously he believes he had 10mg  Has a question about this, would like to talk t to you

## 2022-12-20 ENCOUNTER — OFFICE VISIT (OUTPATIENT)
Dept: INTERNAL MEDICINE CLINIC | Facility: CLINIC | Age: 52
End: 2022-12-20

## 2022-12-20 VITALS
WEIGHT: 306 LBS | SYSTOLIC BLOOD PRESSURE: 122 MMHG | BODY MASS INDEX: 38.05 KG/M2 | DIASTOLIC BLOOD PRESSURE: 82 MMHG | HEIGHT: 75 IN | HEART RATE: 108 BPM | OXYGEN SATURATION: 96 % | TEMPERATURE: 97.4 F

## 2022-12-20 DIAGNOSIS — Z12.11 SPECIAL SCREENING FOR MALIGNANT NEOPLASMS, COLON: ICD-10-CM

## 2022-12-20 DIAGNOSIS — M10.062 ACUTE IDIOPATHIC GOUT OF LEFT KNEE: Primary | ICD-10-CM

## 2022-12-20 DIAGNOSIS — M10.00 IDIOPATHIC GOUT, UNSPECIFIED CHRONICITY, UNSPECIFIED SITE: ICD-10-CM

## 2022-12-20 DIAGNOSIS — I82.452 ACUTE DEEP VEIN THROMBOSIS (DVT) OF LEFT PERONEAL VEIN (HCC): ICD-10-CM

## 2022-12-20 RX ORDER — APIXABAN 5 MG (74)
KIT ORAL
COMMUNITY
Start: 2022-12-17

## 2022-12-20 RX ORDER — OXYCODONE HYDROCHLORIDE AND ACETAMINOPHEN 5; 325 MG/1; MG/1
1 TABLET ORAL EVERY 8 HOURS PRN
Qty: 20 TABLET | Refills: 0 | Status: SHIPPED | OUTPATIENT
Start: 2022-12-20

## 2022-12-20 RX ORDER — ALLOPURINOL 100 MG/1
200 TABLET ORAL DAILY
Qty: 60 TABLET | Refills: 3 | Status: SHIPPED | OUTPATIENT
Start: 2022-12-20

## 2022-12-20 RX ORDER — APIXABAN 5 MG (74)
KIT ORAL
COMMUNITY
Start: 2022-12-13 | End: 2023-01-10

## 2022-12-20 NOTE — PROGRESS NOTES
Assessment/Plan:             1  Acute idiopathic gout of left knee  -     allopurinol (ZYLOPRIM) 100 mg tablet; Take 2 tablets (200 mg total) by mouth daily  -     oxyCODONE-acetaminophen (Percocet) 5-325 mg per tablet; Take 1 tablet by mouth every 8 (eight) hours as needed for moderate pain Max Daily Amount: 3 tablets  -     Uric acid; Future    2  Special screening for malignant neoplasms, colon  -     Ambulatory referral for colonoscopy; Future    3  Acute deep vein thrombosis (DVT) of left peroneal vein (HCC)    4  Idiopathic gout, unspecified chronicity, unspecified site         Subjective:      Patient ID: John Monk is a 46 y o  male  Left knee pain, gout, hospital record reviewed      The following portions of the patient's history were reviewed and updated as appropriate: He  has a past medical history of Asthma, Asthmatic bronchitis, Body mass index 34 0-34 9, adult, Convulsions ([address] ), Diverticulitis, Diverticulosis, Gastric ulcer, Gout, Left shoulder pain, Lumbar disc herniation with radiculopathy, Osteoarthritis, Pharyngitis, Pulmonary embolism ([address] ) (08/15/2012), Right knee pain, Rotator cuff syndrome of left shoulder, Routine general medical examination at a health care facility, SOB (shortness of breath), Spontaneous rupture of tendon of left shoulder, Syncope and collapse, and Vestibular neuritis    He   Patient Active Problem List    Diagnosis Date Noted   • Acute idiopathic gout of left knee 12/20/2022   • Acute deep vein thrombosis (DVT) of left peroneal vein ([address] ) 12/20/2022   • Tracheitis 05/24/2022   • Mild intermittent asthma without complication 28/32/9751   • Impaired fasting blood sugar 01/25/2022   • Mixed hyperlipidemia 01/25/2022   • Sacroiliitis ([address] )    • Chronic pain syndrome 07/08/2021   • Lumbar spondylosis 06/03/2021   • Lumbar disc herniation with radiculopathy    • Gastroesophageal reflux disease without esophagitis 01/12/2021   • Diverticulosis large intestine w/o perforation or abscess w/o bleeding 10/05/2020   • Body mass index 37 0-37 9, adult 10/05/2020   • Gout    • Asthma    • Diverticulitis      He  has a past surgical history that includes Sinus surgery; EGD AND COLONOSCOPY (01/31/2011); and Colon surgery (08/08/2012)  His family history includes Hyperlipidemia in his mother; Hypertension in his mother  He  reports that he has never smoked  He has never used smokeless tobacco  He reports that he does not currently use alcohol  He reports that he does not use drugs  Current Outpatient Medications   Medication Sig Dispense Refill   • allopurinol (ZYLOPRIM) 100 mg tablet Take 2 tablets (200 mg total) by mouth daily 60 tablet 3   • Apixaban Starter Pack (Eliquis DVT/PE Starter Pack) 5 MG TBPK Take as directed per package instructions  • diclofenac (VOLTAREN) 75 mg EC tablet Take 1 tablet (75 mg total) by mouth 2 (two) times a day 30 tablet 0   • Eliquis DVT/PE Starter Pack 5 MG TBPK TAKE AS DIRECTED PER PACKAGE INSTRUCTIONS  • oxyCODONE-acetaminophen (Percocet) 5-325 mg per tablet Take 1 tablet by mouth every 8 (eight) hours as needed for moderate pain Max Daily Amount: 3 tablets 20 tablet 0   • albuterol (PROVENTIL HFA,VENTOLIN HFA) 90 mcg/act inhaler Inhale 2 puffs every 6 (six) hours as needed for wheezing (Patient not taking: Reported on 12/20/2022) 8 g 1   • colchicine (COLCRYS) 0 6 mg tablet Take 1 tablet (0 6 mg total) by mouth 2 (two) times a day (Patient not taking: Reported on 12/20/2022) 20 tablet 0   • cyclobenzaprine (FLEXERIL) 10 mg tablet Take 1 tablet (10 mg total) by mouth 3 (three) times a day as needed for muscle spasms (Patient not taking: Reported on 12/20/2022) 15 tablet 0   • fluticasone-salmeterol (Advair Diskus) 100-50 mcg/dose inhaler Inhale 1 puff 2 (two) times a day Rinse mouth after use   (Patient not taking: Reported on 1/25/2022) 60 blister 1   • omeprazole (PriLOSEC) 20 mg delayed release capsule TAKE 1 CAPSULE (20 MG TOTAL) BY MOUTH DAILY BEFORE BREAKFAST (Patient not taking: Reported on 5/24/2022) 90 capsule 1   • promethazine-codeine (PHENERGAN WITH CODEINE) 6 25-10 mg/5 mL syrup Take 5 mL by mouth every 6 (six) hours as needed for cough (Patient not taking: Reported on 12/20/2022) 150 mL 0     No current facility-administered medications for this visit  Current Outpatient Medications on File Prior to Visit   Medication Sig   • Apixaban Starter Pack (Eliquis DVT/PE Starter Pack) 5 MG TBPK Take as directed per package instructions  • diclofenac (VOLTAREN) 75 mg EC tablet Take 1 tablet (75 mg total) by mouth 2 (two) times a day   • Eliquis DVT/PE Starter Pack 5 MG TBPK TAKE AS DIRECTED PER PACKAGE INSTRUCTIONS  • [DISCONTINUED] allopurinol (ZYLOPRIM) 100 mg tablet Take 1 tablet (100 mg total) by mouth daily   • albuterol (PROVENTIL HFA,VENTOLIN HFA) 90 mcg/act inhaler Inhale 2 puffs every 6 (six) hours as needed for wheezing (Patient not taking: Reported on 12/20/2022)   • colchicine (COLCRYS) 0 6 mg tablet Take 1 tablet (0 6 mg total) by mouth 2 (two) times a day (Patient not taking: Reported on 12/20/2022)   • cyclobenzaprine (FLEXERIL) 10 mg tablet Take 1 tablet (10 mg total) by mouth 3 (three) times a day as needed for muscle spasms (Patient not taking: Reported on 12/20/2022)   • fluticasone-salmeterol (Advair Diskus) 100-50 mcg/dose inhaler Inhale 1 puff 2 (two) times a day Rinse mouth after use  (Patient not taking: Reported on 1/25/2022)   • omeprazole (PriLOSEC) 20 mg delayed release capsule TAKE 1 CAPSULE (20 MG TOTAL) BY MOUTH DAILY BEFORE BREAKFAST (Patient not taking: Reported on 5/24/2022)   • promethazine-codeine (PHENERGAN WITH CODEINE) 6 25-10 mg/5 mL syrup Take 5 mL by mouth every 6 (six) hours as needed for cough (Patient not taking: Reported on 12/20/2022)     No current facility-administered medications on file prior to visit  He is allergic to cefazolin, metronidazole, and levofloxacin       Review of Systems   Constitutional: Negative for chills and fever  HENT: Negative for congestion, ear pain and sore throat  Eyes: Negative for pain  Respiratory: Negative for cough and shortness of breath  Cardiovascular: Negative for chest pain and leg swelling  Gastrointestinal: Negative for abdominal pain, nausea and vomiting  Endocrine: Negative for polyuria  Genitourinary: Negative for difficulty urinating, frequency and urgency  Musculoskeletal: Positive for arthralgias and back pain  Skin: Negative for rash  Neurological: Negative for weakness and headaches  Psychiatric/Behavioral: Negative for sleep disturbance  The patient is not nervous/anxious  Objective:      /82 (BP Location: Left arm, Patient Position: Sitting, Cuff Size: Large)   Pulse (!) 108   Temp (!) 97 4 °F (36 3 °C) (Temporal)   Ht 6' 3" (1 905 m)   Wt (!) 139 kg (306 lb)   SpO2 96%   BMI 38 25 kg/m²     No results found for this or any previous visit (from the past 1344 hour(s))  Physical Exam  Constitutional:       Appearance: Normal appearance  HENT:      Head: Normocephalic  Right Ear: Tympanic membrane, ear canal and external ear normal       Left Ear: Tympanic membrane, ear canal and external ear normal       Nose: Nose normal  No congestion  Mouth/Throat:      Mouth: Mucous membranes are moist       Pharynx: Oropharynx is clear  No oropharyngeal exudate or posterior oropharyngeal erythema  Eyes:      Extraocular Movements: Extraocular movements intact  Conjunctiva/sclera: Conjunctivae normal       Pupils: Pupils are equal, round, and reactive to light  Cardiovascular:      Rate and Rhythm: Normal rate and regular rhythm  Heart sounds: Normal heart sounds  No murmur heard  Pulmonary:      Effort: Pulmonary effort is normal       Breath sounds: Normal breath sounds  No wheezing or rales  Abdominal:      General: Bowel sounds are normal  There is no distension  Palpations: Abdomen is soft  Tenderness: There is no abdominal tenderness  Musculoskeletal:      Cervical back: Normal range of motion and neck supple  Right lower leg: No edema  Left lower leg: No edema  Comments: Left knee exam-tenderness present, mild swelling present, movement painful   Lymphadenopathy:      Cervical: No cervical adenopathy  Skin:     General: Skin is warm  Neurological:      General: No focal deficit present  Mental Status: He is alert and oriented to person, place, and time

## 2022-12-26 ENCOUNTER — TELEPHONE (OUTPATIENT)
Dept: INTERNAL MEDICINE CLINIC | Facility: OTHER | Age: 52
End: 2022-12-26

## 2022-12-28 NOTE — TELEPHONE ENCOUNTER
Spoke to pt he had appt already scheduled for 2/24 and he does not want sooner appt, will call back if anything changes

## 2023-01-13 ENCOUNTER — OFFICE VISIT (OUTPATIENT)
Dept: INTERNAL MEDICINE CLINIC | Facility: CLINIC | Age: 53
End: 2023-01-13

## 2023-01-13 VITALS
OXYGEN SATURATION: 97 % | HEIGHT: 75 IN | TEMPERATURE: 98.8 F | HEART RATE: 92 BPM | SYSTOLIC BLOOD PRESSURE: 134 MMHG | BODY MASS INDEX: 37.55 KG/M2 | WEIGHT: 302 LBS | DIASTOLIC BLOOD PRESSURE: 86 MMHG

## 2023-01-13 DIAGNOSIS — R73.01 IMPAIRED FASTING BLOOD SUGAR: ICD-10-CM

## 2023-01-13 DIAGNOSIS — I82.452 ACUTE DEEP VEIN THROMBOSIS (DVT) OF LEFT PERONEAL VEIN (HCC): ICD-10-CM

## 2023-01-13 DIAGNOSIS — K21.9 GASTROESOPHAGEAL REFLUX DISEASE WITHOUT ESOPHAGITIS: ICD-10-CM

## 2023-01-13 DIAGNOSIS — B49 FUNGAL INFECTION: Primary | ICD-10-CM

## 2023-01-13 DIAGNOSIS — M51.16 LUMBAR DISC HERNIATION WITH RADICULOPATHY: ICD-10-CM

## 2023-01-13 DIAGNOSIS — M10.00 ACUTE IDIOPATHIC GOUT, UNSPECIFIED SITE: ICD-10-CM

## 2023-01-13 RX ORDER — FLUCONAZOLE 150 MG/1
150 TABLET ORAL DAILY
Qty: 4 TABLET | Refills: 0 | Status: SHIPPED | OUTPATIENT
Start: 2023-01-13 | End: 2023-01-17

## 2023-01-13 RX ORDER — OMEPRAZOLE 20 MG/1
20 CAPSULE, DELAYED RELEASE ORAL
Qty: 90 CAPSULE | Refills: 0 | Status: SHIPPED | OUTPATIENT
Start: 2023-01-13

## 2023-01-13 NOTE — PROGRESS NOTES
Assessment/Plan:    BMI Counseling: Body mass index is 37 75 kg/m²  The BMI is above normal  Nutrition recommendations include decreasing portion sizes, encouraging healthy choices of fruits and vegetables and decreasing fast food intake  Exercise recommendations include moderate physical activity 150 minutes/week  Rationale for BMI follow-up plan is due to patient being overweight or obese  1  Fungal infection  -     fluconazole (DIFLUCAN) 150 mg tablet; Take 1 tablet (150 mg total) by mouth daily for 4 doses    2  Acute deep vein thrombosis (DVT) of left peroneal vein (HCC)  -     apixaban (Eliquis) 5 mg; Take 1 tablet (5 mg total) by mouth 2 (two) times a day    3  Acute idiopathic gout, unspecified site  -     Uric acid; Future    4  Lumbar disc herniation with radiculopathy    5  Impaired fasting blood sugar    6  Gastroesophageal reflux disease without esophagitis  -     omeprazole (PriLOSEC) 20 mg delayed release capsule; Take 1 capsule (20 mg total) by mouth daily before breakfast         Subjective:      Patient ID: Loan Crook is a 46 y o  male  Skin rash, itchy, on the back, on the thigh, also need paper for blood work    Rash  Pertinent negatives include no congestion, cough, fever, shortness of breath, sore throat or vomiting  The following portions of the patient's history were reviewed and updated as appropriate: He  has a past medical history of Asthma, Asthmatic bronchitis, Body mass index 34 0-34 9, adult, Convulsions ([address] ), Diverticulitis, Diverticulosis, Gastric ulcer, Gout, Left shoulder pain, Lumbar disc herniation with radiculopathy, Osteoarthritis, Pharyngitis, Pulmonary embolism ([address] ) (08/15/2012), Right knee pain, Rotator cuff syndrome of left shoulder, Routine general medical examination at a health care facility, SOB (shortness of breath), Spontaneous rupture of tendon of left shoulder, Syncope and collapse, and Vestibular neuritis    He   Patient Active Problem List    Diagnosis Date Noted   • Fungal infection 01/13/2023   • Acute idiopathic gout of left knee 12/20/2022   • Acute deep vein thrombosis (DVT) of left peroneal vein ([facility] ) 12/20/2022   • Tracheitis 05/24/2022   • Mild intermittent asthma without complication 43/84/1941   • Impaired fasting blood sugar 01/25/2022   • Mixed hyperlipidemia 01/25/2022   • Sacroiliitis ([facility] [address] )    • Chronic pain syndrome 07/08/2021   • Lumbar spondylosis 06/03/2021   • Lumbar disc herniation with radiculopathy    • Gastroesophageal reflux disease without esophagitis 01/12/2021   • Diverticulosis large intestine w/o perforation or abscess w/o bleeding 10/05/2020   • Body mass index 37 0-37 9, adult 10/05/2020   • Gout    • Asthma    • Diverticulitis      He  has a past surgical history that includes Sinus surgery; EGD AND COLONOSCOPY (01/31/2011); and Colon surgery (08/08/2012)  His family history includes Hyperlipidemia in his mother; Hypertension in his mother  He  reports that he has never smoked  He has never used smokeless tobacco  He reports that he does not currently use alcohol  He reports that he does not use drugs    Current Outpatient Medications   Medication Sig Dispense Refill   • allopurinol (ZYLOPRIM) 100 mg tablet Take 2 tablets (200 mg total) by mouth daily 60 tablet 3   • apixaban (Eliquis) 5 mg Take 1 tablet (5 mg total) by mouth 2 (two) times a day 60 tablet 1   • diclofenac (VOLTAREN) 75 mg EC tablet Take 1 tablet (75 mg total) by mouth 2 (two) times a day 30 tablet 0   • fluconazole (DIFLUCAN) 150 mg tablet Take 1 tablet (150 mg total) by mouth daily for 4 doses 4 tablet 0   • omeprazole (PriLOSEC) 20 mg delayed release capsule Take 1 capsule (20 mg total) by mouth daily before breakfast 90 capsule 0   • oxyCODONE-acetaminophen (Percocet) 5-325 mg per tablet Take 1 tablet by mouth every 8 (eight) hours as needed for moderate pain Max Daily Amount: 3 tablets 20 tablet 0   • albuterol (PROVENTIL HFA,VENTOLIN HFA) 90 mcg/act inhaler Inhale 2 puffs every 6 (six) hours as needed for wheezing (Patient not taking: Reported on 12/20/2022) 8 g 1   • colchicine (COLCRYS) 0 6 mg tablet Take 1 tablet (0 6 mg total) by mouth 2 (two) times a day (Patient not taking: Reported on 12/20/2022) 20 tablet 0   • cyclobenzaprine (FLEXERIL) 10 mg tablet Take 1 tablet (10 mg total) by mouth 3 (three) times a day as needed for muscle spasms (Patient not taking: Reported on 12/20/2022) 15 tablet 0   • fluticasone-salmeterol (Advair Diskus) 100-50 mcg/dose inhaler Inhale 1 puff 2 (two) times a day Rinse mouth after use  (Patient not taking: Reported on 1/25/2022) 60 blister 1   • promethazine-codeine (PHENERGAN WITH CODEINE) 6 25-10 mg/5 mL syrup Take 5 mL by mouth every 6 (six) hours as needed for cough (Patient not taking: Reported on 12/20/2022) 150 mL 0     No current facility-administered medications for this visit  Current Outpatient Medications on File Prior to Visit   Medication Sig   • allopurinol (ZYLOPRIM) 100 mg tablet Take 2 tablets (200 mg total) by mouth daily   • diclofenac (VOLTAREN) 75 mg EC tablet Take 1 tablet (75 mg total) by mouth 2 (two) times a day   • oxyCODONE-acetaminophen (Percocet) 5-325 mg per tablet Take 1 tablet by mouth every 8 (eight) hours as needed for moderate pain Max Daily Amount: 3 tablets   • [DISCONTINUED] Eliquis DVT/PE Starter Pack 5 MG TBPK TAKE AS DIRECTED PER PACKAGE INSTRUCTIONS     • albuterol (PROVENTIL HFA,VENTOLIN HFA) 90 mcg/act inhaler Inhale 2 puffs every 6 (six) hours as needed for wheezing (Patient not taking: Reported on 12/20/2022)   • colchicine (COLCRYS) 0 6 mg tablet Take 1 tablet (0 6 mg total) by mouth 2 (two) times a day (Patient not taking: Reported on 12/20/2022)   • cyclobenzaprine (FLEXERIL) 10 mg tablet Take 1 tablet (10 mg total) by mouth 3 (three) times a day as needed for muscle spasms (Patient not taking: Reported on 12/20/2022)   • fluticasone-salmeterol (Advair Diskus) 100-50 mcg/dose inhaler Inhale 1 puff 2 (two) times a day Rinse mouth after use  (Patient not taking: Reported on 1/25/2022)   • promethazine-codeine (PHENERGAN WITH CODEINE) 6 25-10 mg/5 mL syrup Take 5 mL by mouth every 6 (six) hours as needed for cough (Patient not taking: Reported on 12/20/2022)   • [DISCONTINUED] omeprazole (PriLOSEC) 20 mg delayed release capsule TAKE 1 CAPSULE (20 MG TOTAL) BY MOUTH DAILY BEFORE BREAKFAST (Patient not taking: Reported on 5/24/2022)     No current facility-administered medications on file prior to visit  He is allergic to cefazolin, metronidazole, and levofloxacin       Review of Systems   Constitutional: Negative for chills and fever  HENT: Negative for congestion, ear pain and sore throat  Eyes: Negative for pain  Respiratory: Negative for cough and shortness of breath  Cardiovascular: Negative for chest pain and leg swelling  Gastrointestinal: Negative for abdominal pain, nausea and vomiting  Endocrine: Negative for polyuria  Genitourinary: Negative for difficulty urinating, frequency and urgency  Musculoskeletal: Positive for arthralgias  Negative for back pain  Skin: Positive for rash  Neurological: Negative for weakness and headaches  Psychiatric/Behavioral: Negative for sleep disturbance  The patient is not nervous/anxious  Objective:      /86 (BP Location: Left arm, Patient Position: Sitting, Cuff Size: Standard)   Pulse 92   Temp 98 8 °F (37 1 °C)   Ht 6' 3" (1 905 m)   Wt (!) 137 kg (302 lb)   SpO2 97%   BMI 37 75 kg/m²     No results found for this or any previous visit (from the past 1344 hour(s))  Physical Exam  Constitutional:       Appearance: Normal appearance  HENT:      Head: Normocephalic  Right Ear: Tympanic membrane, ear canal and external ear normal       Left Ear: Tympanic membrane, ear canal and external ear normal       Nose: Nose normal  No congestion        Mouth/Throat:      Mouth: Mucous membranes are moist       Pharynx: Oropharynx is clear  No oropharyngeal exudate or posterior oropharyngeal erythema  Eyes:      Extraocular Movements: Extraocular movements intact  Conjunctiva/sclera: Conjunctivae normal       Pupils: Pupils are equal, round, and reactive to light  Cardiovascular:      Rate and Rhythm: Normal rate and regular rhythm  Heart sounds: Normal heart sounds  No murmur heard  Pulmonary:      Effort: Pulmonary effort is normal       Breath sounds: Normal breath sounds  No wheezing or rales  Abdominal:      General: Bowel sounds are normal  There is no distension  Palpations: Abdomen is soft  Tenderness: There is no abdominal tenderness  Musculoskeletal:         General: Normal range of motion  Cervical back: Normal range of motion and neck supple  Right lower leg: No edema  Left lower leg: No edema  Lymphadenopathy:      Cervical: No cervical adenopathy  Skin:     General: Skin is warm  Comments: Macular erythematous rash on bilateral gluteal area as well as thighs anteriorly   Neurological:      General: No focal deficit present  Mental Status: He is alert and oriented to person, place, and time

## 2023-01-24 ENCOUNTER — OFFICE VISIT (OUTPATIENT)
Dept: INTERNAL MEDICINE CLINIC | Facility: CLINIC | Age: 53
End: 2023-01-24

## 2023-01-24 VITALS
SYSTOLIC BLOOD PRESSURE: 124 MMHG | DIASTOLIC BLOOD PRESSURE: 78 MMHG | HEART RATE: 64 BPM | BODY MASS INDEX: 37.67 KG/M2 | HEIGHT: 75 IN | OXYGEN SATURATION: 95 % | TEMPERATURE: 98 F | WEIGHT: 303 LBS

## 2023-01-24 DIAGNOSIS — Z12.11 SCREENING FOR COLORECTAL CANCER: ICD-10-CM

## 2023-01-24 DIAGNOSIS — Z12.12 SCREENING FOR COLORECTAL CANCER: ICD-10-CM

## 2023-01-24 DIAGNOSIS — M10.072 ACUTE IDIOPATHIC GOUT OF LEFT FOOT: Primary | ICD-10-CM

## 2023-01-24 RX ORDER — DICLOFENAC SODIUM 75 MG/1
75 TABLET, DELAYED RELEASE ORAL 2 TIMES DAILY
Qty: 30 TABLET | Refills: 0 | Status: SHIPPED | OUTPATIENT
Start: 2023-01-24

## 2023-01-24 RX ORDER — KETOROLAC TROMETHAMINE 30 MG/ML
60 INJECTION, SOLUTION INTRAMUSCULAR; INTRAVENOUS ONCE
Status: COMPLETED | OUTPATIENT
Start: 2023-01-24 | End: 2023-01-24

## 2023-01-24 RX ADMIN — KETOROLAC TROMETHAMINE 60 MG: 30 INJECTION, SOLUTION INTRAMUSCULAR; INTRAVENOUS at 12:12

## 2023-01-24 NOTE — PROGRESS NOTES
Assessment/Plan:             1  Acute idiopathic gout of left foot  -     diclofenac (VOLTAREN) 75 mg EC tablet; Take 1 tablet (75 mg total) by mouth 2 (two) times a day    2  Screening for colorectal cancer  -     Ambulatory referral for colonoscopy; Future         Subjective:      Patient ID: Damon Johnson is a 46 y o  male  Left foot pain, no trauma, no fever no chills      The following portions of the patient's history were reviewed and updated as appropriate: He  has a past medical history of Asthma, Asthmatic bronchitis, Body mass index 34 0-34 9, adult, Convulsions ([address] ), Diverticulitis, Diverticulosis, Gastric ulcer, Gout, Left shoulder pain, Lumbar disc herniation with radiculopathy, Osteoarthritis, Pharyngitis, Pulmonary embolism ([address] ) (08/15/2012), Right knee pain, Rotator cuff syndrome of left shoulder, Routine general medical examination at a health care facility, SOB (shortness of breath), Spontaneous rupture of tendon of left shoulder, Syncope and collapse, and Vestibular neuritis    He   Patient Active Problem List    Diagnosis Date Noted   • Acute idiopathic gout of left foot 01/24/2023   • Screening for colorectal cancer 01/24/2023   • Fungal infection 01/13/2023   • Acute idiopathic gout of left knee 12/20/2022   • Acute deep vein thrombosis (DVT) of left peroneal vein ([facility] [address] ) 12/20/2022   • Tracheitis 05/24/2022   • Mild intermittent asthma without complication 57/07/8975   • Impaired fasting blood sugar 01/25/2022   • Mixed hyperlipidemia 01/25/2022   • Sacroiliitis ([address] )    • Chronic pain syndrome 07/08/2021   • Lumbar spondylosis 06/03/2021   • Lumbar disc herniation with radiculopathy    • Gastroesophageal reflux disease without esophagitis 01/12/2021   • Diverticulosis large intestine w/o perforation or abscess w/o bleeding 10/05/2020   • Body mass index 37 0-37 9, adult 10/05/2020   • Gout    • Asthma    • Diverticulitis      He  has a past surgical history that includes Sinus surgery; EGD AND COLONOSCOPY (01/31/2011); and Colon surgery (08/08/2012)  His family history includes Hyperlipidemia in his mother; Hypertension in his mother  He  reports that he has never smoked  He has never used smokeless tobacco  He reports that he does not currently use alcohol  He reports that he does not use drugs  Current Outpatient Medications   Medication Sig Dispense Refill   • allopurinol (ZYLOPRIM) 100 mg tablet Take 2 tablets (200 mg total) by mouth daily 60 tablet 3   • apixaban (Eliquis) 5 mg Take 1 tablet (5 mg total) by mouth 2 (two) times a day 60 tablet 1   • diclofenac (VOLTAREN) 75 mg EC tablet Take 1 tablet (75 mg total) by mouth 2 (two) times a day 30 tablet 0   • oxyCODONE-acetaminophen (Percocet) 5-325 mg per tablet Take 1 tablet by mouth every 8 (eight) hours as needed for moderate pain Max Daily Amount: 3 tablets 20 tablet 0   • albuterol (PROVENTIL HFA,VENTOLIN HFA) 90 mcg/act inhaler Inhale 2 puffs every 6 (six) hours as needed for wheezing 8 g 1   • colchicine (COLCRYS) 0 6 mg tablet Take 1 tablet (0 6 mg total) by mouth 2 (two) times a day (Patient not taking: Reported on 1/24/2023) 20 tablet 0   • cyclobenzaprine (FLEXERIL) 10 mg tablet Take 1 tablet (10 mg total) by mouth 3 (three) times a day as needed for muscle spasms (Patient not taking: Reported on 1/24/2023) 15 tablet 0     No current facility-administered medications for this visit       Current Outpatient Medications on File Prior to Visit   Medication Sig   • allopurinol (ZYLOPRIM) 100 mg tablet Take 2 tablets (200 mg total) by mouth daily   • apixaban (Eliquis) 5 mg Take 1 tablet (5 mg total) by mouth 2 (two) times a day   • oxyCODONE-acetaminophen (Percocet) 5-325 mg per tablet Take 1 tablet by mouth every 8 (eight) hours as needed for moderate pain Max Daily Amount: 3 tablets   • [DISCONTINUED] diclofenac (VOLTAREN) 75 mg EC tablet Take 1 tablet (75 mg total) by mouth 2 (two) times a day   • albuterol (PROVENTIL HFA,VENTOLIN HFA) 90 mcg/act inhaler Inhale 2 puffs every 6 (six) hours as needed for wheezing   • colchicine (COLCRYS) 0 6 mg tablet Take 1 tablet (0 6 mg total) by mouth 2 (two) times a day (Patient not taking: Reported on 1/24/2023)   • cyclobenzaprine (FLEXERIL) 10 mg tablet Take 1 tablet (10 mg total) by mouth 3 (three) times a day as needed for muscle spasms (Patient not taking: Reported on 1/24/2023)   • [DISCONTINUED] fluticasone-salmeterol (Advair Diskus) 100-50 mcg/dose inhaler Inhale 1 puff 2 (two) times a day Rinse mouth after use  (Patient not taking: Reported on 1/24/2023)   • [DISCONTINUED] omeprazole (PriLOSEC) 20 mg delayed release capsule Take 1 capsule (20 mg total) by mouth daily before breakfast   • [DISCONTINUED] promethazine-codeine (PHENERGAN WITH CODEINE) 6 25-10 mg/5 mL syrup Take 5 mL by mouth every 6 (six) hours as needed for cough     No current facility-administered medications on file prior to visit  He is allergic to cefazolin, metronidazole, and levofloxacin       Review of Systems   Constitutional: Negative for chills and fever  HENT: Negative for congestion, ear pain and sore throat  Eyes: Negative for pain  Respiratory: Negative for cough and shortness of breath  Cardiovascular: Negative for chest pain and leg swelling  Gastrointestinal: Negative for abdominal pain, nausea and vomiting  Endocrine: Negative for polyuria  Genitourinary: Negative for difficulty urinating, frequency and urgency  Musculoskeletal: Positive for arthralgias  Negative for back pain  Skin: Negative for rash  Neurological: Negative for weakness and headaches  Psychiatric/Behavioral: Negative for sleep disturbance  The patient is not nervous/anxious            Objective:      /78 (BP Location: Left arm, Patient Position: Sitting, Cuff Size: Standard)   Pulse 64   Temp 98 °F (36 7 °C) (Temporal)   Ht 6' 3" (1 905 m)   Wt (!) 137 kg (303 lb)   SpO2 95%   BMI 37 87 kg/m²     No results found for this or any previous visit (from the past 1344 hour(s))  Physical Exam  Constitutional:       Appearance: Normal appearance  HENT:      Head: Normocephalic  Right Ear: Tympanic membrane, ear canal and external ear normal       Left Ear: Tympanic membrane, ear canal and external ear normal       Nose: Nose normal  No congestion  Mouth/Throat:      Mouth: Mucous membranes are moist       Pharynx: Oropharynx is clear  No oropharyngeal exudate or posterior oropharyngeal erythema  Eyes:      Extraocular Movements: Extraocular movements intact  Conjunctiva/sclera: Conjunctivae normal       Pupils: Pupils are equal, round, and reactive to light  Cardiovascular:      Rate and Rhythm: Normal rate and regular rhythm  Heart sounds: Normal heart sounds  No murmur heard  Pulmonary:      Effort: Pulmonary effort is normal       Breath sounds: Normal breath sounds  No wheezing or rales  Abdominal:      General: Bowel sounds are normal  There is no distension  Palpations: Abdomen is soft  Tenderness: There is no abdominal tenderness  Musculoskeletal:      Cervical back: Normal range of motion and neck supple  Right lower leg: No edema  Left lower leg: No edema  Comments: Left foot exam-midfoot laterally tenderness present, skin redness present, movement painful   Lymphadenopathy:      Cervical: No cervical adenopathy  Skin:     General: Skin is warm  Neurological:      General: No focal deficit present  Mental Status: He is alert and oriented to person, place, and time

## 2023-01-27 ENCOUNTER — APPOINTMENT (EMERGENCY)
Dept: RADIOLOGY | Facility: HOSPITAL | Age: 53
End: 2023-01-27

## 2023-01-27 ENCOUNTER — HOSPITAL ENCOUNTER (EMERGENCY)
Facility: HOSPITAL | Age: 53
Discharge: HOME | End: 2023-01-27
Attending: EMERGENCY MEDICINE

## 2023-01-27 ENCOUNTER — TELEPHONE (OUTPATIENT)
Dept: INTERNAL MEDICINE CLINIC | Facility: CLINIC | Age: 53
End: 2023-01-27

## 2023-01-27 VITALS
OXYGEN SATURATION: 98 % | RESPIRATION RATE: 20 BRPM | TEMPERATURE: 97.7 F | SYSTOLIC BLOOD PRESSURE: 161 MMHG | DIASTOLIC BLOOD PRESSURE: 96 MMHG | HEART RATE: 104 BPM

## 2023-01-27 DIAGNOSIS — M10.00 IDIOPATHIC GOUT, UNSPECIFIED CHRONICITY, UNSPECIFIED SITE: ICD-10-CM

## 2023-01-27 DIAGNOSIS — M10.9 GOUTY ARTHRITIS OF FOOT: Primary | ICD-10-CM

## 2023-01-27 RX ORDER — OXYCODONE HYDROCHLORIDE 5 MG/1
5 TABLET ORAL ONCE
Status: COMPLETED | OUTPATIENT
Start: 2023-01-27 | End: 2023-01-27

## 2023-01-27 RX ORDER — PREDNISONE 20 MG/1
40 TABLET ORAL ONCE
Status: COMPLETED | OUTPATIENT
Start: 2023-01-27 | End: 2023-01-27

## 2023-01-27 RX ORDER — PREDNISONE 20 MG/1
40 TABLET ORAL DAILY
Qty: 10 TABLET | Refills: 0 | Status: SHIPPED | OUTPATIENT
Start: 2023-01-27 | End: 2023-02-01

## 2023-01-27 RX ORDER — COLCHICINE 0.6 MG/1
0.6 TABLET ORAL 2 TIMES DAILY
Qty: 20 TABLET | Refills: 0 | Status: SHIPPED | OUTPATIENT
Start: 2023-01-27

## 2023-01-27 RX ADMIN — PREDNISONE 40 MG: 20 TABLET ORAL at 13:43

## 2023-01-27 RX ADMIN — OXYCODONE HYDROCHLORIDE 5 MG: 5 TABLET ORAL at 13:43

## 2023-01-29 NOTE — ED PROVIDER NOTES
History  Chief Complaint   Patient presents with   • Foot Pain     Pt arrives c/o L foot pain and swelling since Monday  Seen Tues by PCP and seen no improvement with rescue meds      46year old male presenting with left foot pain and swelling since Monday  Patient was seen by PCP on Tuesday and given medications which did not seem to help  Patient states that he has a history of gout and this feels similar, although he has never had it in this part of his foot  Patient denies any systemic symptoms like fever, chest pain, shortness of breath, abdominal pain, nausea, vomiting, diarrhea, constipation  Prior to Admission Medications   Prescriptions Last Dose Informant Patient Reported? Taking?    albuterol (PROVENTIL HFA,VENTOLIN HFA) 90 mcg/act inhaler   No No   Sig: Inhale 2 puffs every 6 (six) hours as needed for wheezing   allopurinol (ZYLOPRIM) 100 mg tablet   No No   Sig: Take 2 tablets (200 mg total) by mouth daily   apixaban (Eliquis) 5 mg   No No   Sig: Take 1 tablet (5 mg total) by mouth 2 (two) times a day   colchicine (COLCRYS) 0 6 mg tablet   No No   Sig: Take 1 tablet (0 6 mg total) by mouth 2 (two) times a day   cyclobenzaprine (FLEXERIL) 10 mg tablet   No No   Sig: Take 1 tablet (10 mg total) by mouth 3 (three) times a day as needed for muscle spasms   Patient not taking: Reported on 1/24/2023   diclofenac (VOLTAREN) 75 mg EC tablet   No No   Sig: Take 1 tablet (75 mg total) by mouth 2 (two) times a day   oxyCODONE-acetaminophen (Percocet) 5-325 mg per tablet   No No   Sig: Take 1 tablet by mouth every 8 (eight) hours as needed for moderate pain Max Daily Amount: 3 tablets      Facility-Administered Medications: None       Past Medical History:   Diagnosis Date   • Asthma    • Asthmatic bronchitis    • Body mass index 34 0-34 9, adult    • Convulsions (HCC)    • Diverticulitis    • Diverticulosis     sigmoid colon - on colonoscopy 1/31/2011    • Gastric ulcer    • Gout    • Left shoulder pain • Lumbar disc herniation with radiculopathy    • Osteoarthritis    • Pharyngitis    • Pulmonary embolism (HCC) 08/15/2012   • Right knee pain    • Rotator cuff syndrome of left shoulder    • Routine general medical examination at a health care facility    • SOB (shortness of breath)    • Spontaneous rupture of tendon of left shoulder    • Syncope and collapse    • Vestibular neuritis        Past Surgical History:   Procedure Laterality Date   • COLON SURGERY  08/08/2012    Sigmoid colon removed for diverticulosis    • EGD AND COLONOSCOPY  01/31/2011    Dr Ran Cast   • SINUS SURGERY         Family History   Problem Relation Age of Onset   • Hypertension Mother    • Hyperlipidemia Mother      I have reviewed and agree with the history as documented  E-Cigarette/Vaping   • E-Cigarette Use Never User      E-Cigarette/Vaping Substances   • Nicotine No    • THC No    • CBD No    • Flavoring No    • Other No    • Unknown No      Social History     Tobacco Use   • Smoking status: Never   • Smokeless tobacco: Never   Vaping Use   • Vaping Use: Never used   Substance Use Topics   • Alcohol use: Not Currently   • Drug use: Never       Review of Systems   Constitutional: Negative for chills and fever  HENT: Negative for ear pain and sore throat  Eyes: Negative for pain and visual disturbance  Respiratory: Negative for cough and shortness of breath  Cardiovascular: Negative for chest pain and palpitations  Gastrointestinal: Negative for abdominal pain and vomiting  Genitourinary: Negative for dysuria and hematuria  Musculoskeletal: Positive for arthralgias (Foot), gait problem and joint swelling  Negative for back pain, myalgias, neck pain and neck stiffness  Skin: Positive for color change  Negative for pallor, rash and wound  Neurological: Negative for dizziness, seizures, syncope and headaches  All other systems reviewed and are negative        Physical Exam  Physical Exam  Vitals and nursing note reviewed  Constitutional:       General: He is not in acute distress  Appearance: He is well-developed  HENT:      Head: Normocephalic and atraumatic  Eyes:      Conjunctiva/sclera: Conjunctivae normal    Cardiovascular:      Rate and Rhythm: Normal rate and regular rhythm  Heart sounds: No murmur heard  Pulmonary:      Effort: Pulmonary effort is normal  No respiratory distress  Breath sounds: Normal breath sounds  Abdominal:      Palpations: Abdomen is soft  Tenderness: There is no abdominal tenderness  Musculoskeletal:         General: Swelling (Left Foot), tenderness and deformity present  No signs of injury  Cervical back: Neck supple  Right lower leg: No edema  Left lower leg: No edema  Skin:     General: Skin is warm and dry  Capillary Refill: Capillary refill takes less than 2 seconds  Coloration: Skin is not jaundiced or pale  Findings: No bruising, erythema, lesion or rash  Neurological:      Mental Status: He is alert     Psychiatric:         Mood and Affect: Mood normal          Vital Signs  ED Triage Vitals   Temperature Pulse Respirations Blood Pressure SpO2   01/27/23 1245 01/27/23 1245 01/27/23 1245 01/27/23 1245 01/27/23 1245   97 7 °F (36 5 °C) 104 20 161/96 98 %      Temp Source Heart Rate Source Patient Position - Orthostatic VS BP Location FiO2 (%)   01/27/23 1245 01/27/23 1245 01/27/23 1245 01/27/23 1245 --   Oral Monitor Sitting Right arm       Pain Score       01/27/23 1343       10 - Worst Possible Pain           Vitals:    01/27/23 1245   BP: 161/96   Pulse: 104   Patient Position - Orthostatic VS: Sitting         Visual Acuity      ED Medications  Medications   predniSONE tablet 40 mg (40 mg Oral Given 1/27/23 1343)   oxyCODONE (ROXICODONE) IR tablet 5 mg (5 mg Oral Given 1/27/23 1343)       Diagnostic Studies  Results Reviewed     None                 XR foot 3+ views LEFT   ED Interpretation by Colette Johnson PA-C (01/27 1412)   No acute fractures or dislocations      Final Result by Ronald Rubi MD (01/27 8564)      No acute osseous abnormality  Workstation performed: [id number]                    Procedures  Procedures         ED Course                                             Medical Decision Making  49-year-old male presenting with signs symptoms of gout  He has a history of gout and this feels very similar to patient  Patient denies any traumas, blood thinners or injuries to the area  Patient states that he recently had his gout under control but chose poorly last few things that he is been eating  Oxycodone for pain  X-ray to rule out any fracture or signs of infection  X-ray came back negative  We will treat patient with steroids for resolution of gout arthritis  Follow up with family medicine for further evaluation for resolution of symptoms  Gouty arthritis of foot: complicated acute illness or injury  Amount and/or Complexity of Data Reviewed  Radiology: ordered and independent interpretation performed  Risk  Prescription drug management  Disposition  Final diagnoses:   Gouty arthritis of foot     Time reflects when diagnosis was documented in both MDM as applicable and the Disposition within this note     Time User Action Codes Description Comment    1/27/2023  2:12 PM Jailyn Sutton Add [M10 9] Gouty arthritis of foot       ED Disposition     ED Disposition   Discharge    Condition   Stable    Date/Time   Fri Jan 27, 2023  2:12 PM    Comment   Grabiel Ros discharge to home/self care                 Follow-up Information     Follow up With Specialties Details Why Contact Info Additional [address] Emergency Department Emergency Medicine Go to  If symptoms worsen [address] Emergency Department, [address], [place], [place], [address] Zachary Ta MD Internal Medicine Schedule an appointment as soon as possible for a visit  As needed [address] [facility]  [phone]             Discharge Medication List as of 1/27/2023  2:19 PM      START taking these medications    Details   predniSONE 20 mg tablet Take 2 tablets (40 mg total) by mouth daily for 5 days, Starting Fri 1/27/2023, Until Wed 2/1/2023, Normal         CONTINUE these medications which have NOT CHANGED    Details   albuterol (PROVENTIL HFA,VENTOLIN HFA) 90 mcg/act inhaler Inhale 2 puffs every 6 (six) hours as needed for wheezing, Starting Wed 12/29/2021, Normal      allopurinol (ZYLOPRIM) 100 mg tablet Take 2 tablets (200 mg total) by mouth daily, Starting Tue 12/20/2022, Normal      apixaban (Eliquis) 5 mg Take 1 tablet (5 mg total) by mouth 2 (two) times a day, Starting Fri 1/13/2023, Normal      colchicine (COLCRYS) 0 6 mg tablet Take 1 tablet (0 6 mg total) by mouth 2 (two) times a day, Starting Fri 1/27/2023, Normal      cyclobenzaprine (FLEXERIL) 10 mg tablet Take 1 tablet (10 mg total) by mouth 3 (three) times a day as needed for muscle spasms, Starting Mon 8/29/2022, Normal      diclofenac (VOLTAREN) 75 mg EC tablet Take 1 tablet (75 mg total) by mouth 2 (two) times a day, Starting Tue 1/24/2023, Normal      oxyCODONE-acetaminophen (Percocet) 5-325 mg per tablet Take 1 tablet by mouth every 8 (eight) hours as needed for moderate pain Max Daily Amount: 3 tablets, Starting Tue 12/20/2022, Normal             No discharge procedures on file      PDMP Review       Value Time User    PDMP Reviewed  Yes 12/20/2022 11:01 AM Zachary Ta MD          ED Provider  Electronically Signed by           Yuli Dasilva PA-C  01/29/23 2767

## 2023-02-23 LAB — URATE SERPL-MCNC: 6.7 MG/DL (ref 4–8)

## 2023-02-24 ENCOUNTER — OFFICE VISIT (OUTPATIENT)
Dept: INTERNAL MEDICINE CLINIC | Facility: CLINIC | Age: 53
End: 2023-02-24

## 2023-02-24 VITALS
TEMPERATURE: 98.3 F | OXYGEN SATURATION: 96 % | DIASTOLIC BLOOD PRESSURE: 88 MMHG | SYSTOLIC BLOOD PRESSURE: 126 MMHG | BODY MASS INDEX: 37.92 KG/M2 | WEIGHT: 305 LBS | HEIGHT: 75 IN | HEART RATE: 92 BPM

## 2023-02-24 DIAGNOSIS — Z12.11 SCREENING FOR COLON CANCER: ICD-10-CM

## 2023-02-24 DIAGNOSIS — E78.2 MIXED HYPERLIPIDEMIA: ICD-10-CM

## 2023-02-24 DIAGNOSIS — M10.072 ACUTE IDIOPATHIC GOUT OF LEFT FOOT: Primary | ICD-10-CM

## 2023-02-24 DIAGNOSIS — M51.16 LUMBAR DISC HERNIATION WITH RADICULOPATHY: ICD-10-CM

## 2023-02-24 DIAGNOSIS — I82.452 ACUTE DEEP VEIN THROMBOSIS (DVT) OF LEFT PERONEAL VEIN (HCC): ICD-10-CM

## 2023-02-24 NOTE — PROGRESS NOTES
Assessment/Plan:      Depression Screening and Follow-up Plan: Patient was screened for depression during today's encounter  They screened negative with a PHQ-2 score of 0             1  Acute idiopathic gout of left foot    2  Screening for colon cancer  -     Ambulatory referral for colonoscopy; Future    3  Acute deep vein thrombosis (DVT) of left peroneal vein (HCC)    4  Lumbar disc herniation with radiculopathy    5  Mixed hyperlipidemia         Subjective:      Patient ID: Taylor Kwong is a 46 y o  male  Follow-up on blood test done on 2/22/2023 test discussed with him, left foot pain better stable      The following portions of the patient's history were reviewed and updated as appropriate: He  has a past medical history of Asthma, Asthmatic bronchitis, Body mass index 34 0-34 9, adult, Convulsions ([address] ), Diverticulitis, Diverticulosis, Gastric ulcer, Gout, Left shoulder pain, Lumbar disc herniation with radiculopathy, Osteoarthritis, Pharyngitis, Pulmonary embolism ([address] ) (08/15/2012), Right knee pain, Rotator cuff syndrome of left shoulder, Routine general medical examination at a health care facility, SOB (shortness of breath), Spontaneous rupture of tendon of left shoulder, Syncope and collapse, and Vestibular neuritis    He   Patient Active Problem List    Diagnosis Date Noted   • Acute idiopathic gout of left foot 01/24/2023   • Screening for colon cancer 01/24/2023   • Fungal infection 01/13/2023   • Acute idiopathic gout of left knee 12/20/2022   • Acute deep vein thrombosis (DVT) of left peroneal vein ([address] ) 12/20/2022   • Tracheitis 05/24/2022   • Mild intermittent asthma without complication 51/43/8322   • Impaired fasting blood sugar 01/25/2022   • Mixed hyperlipidemia 01/25/2022   • Sacroiliitis ([address] )    • Chronic pain syndrome 07/08/2021   • Lumbar spondylosis 06/03/2021   • Lumbar disc herniation with radiculopathy    • Gastroesophageal reflux disease without esophagitis 01/12/2021   • Diverticulosis large intestine w/o perforation or abscess w/o bleeding 10/05/2020   • Body mass index 37 0-37 9, adult 10/05/2020   • Gout    • Asthma    • Diverticulitis      He  has a past surgical history that includes Sinus surgery; EGD AND COLONOSCOPY (01/31/2011); and Colon surgery (08/08/2012)  His family history includes Hyperlipidemia in his mother; Hypertension in his mother  He  reports that he has never smoked  He has never used smokeless tobacco  He reports that he does not currently use alcohol  He reports that he does not use drugs  Current Outpatient Medications   Medication Sig Dispense Refill   • albuterol (PROVENTIL HFA,VENTOLIN HFA) 90 mcg/act inhaler Inhale 2 puffs every 6 (six) hours as needed for wheezing 8 g 1   • allopurinol (ZYLOPRIM) 100 mg tablet Take 2 tablets (200 mg total) by mouth daily 60 tablet 3   • apixaban (Eliquis) 5 mg Take 1 tablet (5 mg total) by mouth 2 (two) times a day 60 tablet 1   • diclofenac (VOLTAREN) 75 mg EC tablet Take 1 tablet (75 mg total) by mouth 2 (two) times a day 30 tablet 0   • colchicine (COLCRYS) 0 6 mg tablet Take 1 tablet (0 6 mg total) by mouth 2 (two) times a day (Patient not taking: Reported on 2/24/2023) 20 tablet 0   • cyclobenzaprine (FLEXERIL) 10 mg tablet Take 1 tablet (10 mg total) by mouth 3 (three) times a day as needed for muscle spasms (Patient not taking: Reported on 2/24/2023) 15 tablet 0   • oxyCODONE-acetaminophen (Percocet) 5-325 mg per tablet Take 1 tablet by mouth every 8 (eight) hours as needed for moderate pain Max Daily Amount: 3 tablets (Patient not taking: Reported on 2/24/2023) 20 tablet 0     No current facility-administered medications for this visit       Current Outpatient Medications on File Prior to Visit   Medication Sig   • albuterol (PROVENTIL HFA,VENTOLIN HFA) 90 mcg/act inhaler Inhale 2 puffs every 6 (six) hours as needed for wheezing   • allopurinol (ZYLOPRIM) 100 mg tablet Take 2 tablets (200 mg total) by mouth daily   • apixaban (Eliquis) 5 mg Take 1 tablet (5 mg total) by mouth 2 (two) times a day   • diclofenac (VOLTAREN) 75 mg EC tablet Take 1 tablet (75 mg total) by mouth 2 (two) times a day   • colchicine (COLCRYS) 0 6 mg tablet Take 1 tablet (0 6 mg total) by mouth 2 (two) times a day (Patient not taking: Reported on 2/24/2023)   • cyclobenzaprine (FLEXERIL) 10 mg tablet Take 1 tablet (10 mg total) by mouth 3 (three) times a day as needed for muscle spasms (Patient not taking: Reported on 2/24/2023)   • oxyCODONE-acetaminophen (Percocet) 5-325 mg per tablet Take 1 tablet by mouth every 8 (eight) hours as needed for moderate pain Max Daily Amount: 3 tablets (Patient not taking: Reported on 2/24/2023)     No current facility-administered medications on file prior to visit  He is allergic to cefazolin, metronidazole, and levofloxacin       Review of Systems   Constitutional: Negative for chills and fever  HENT: Negative for congestion, ear pain and sore throat  Eyes: Negative for pain  Respiratory: Negative for cough and shortness of breath  Cardiovascular: Negative for chest pain and leg swelling  Gastrointestinal: Negative for abdominal pain, nausea and vomiting  Endocrine: Negative for polyuria  Genitourinary: Negative for difficulty urinating, frequency and urgency  Musculoskeletal: Positive for arthralgias  Negative for back pain  Skin: Negative for rash  Neurological: Negative for weakness and headaches  Psychiatric/Behavioral: Negative for sleep disturbance  The patient is not nervous/anxious            Objective:      /88 (BP Location: Left arm, Patient Position: Sitting, Cuff Size: Standard)   Pulse 92   Temp 98 3 °F (36 8 °C) (Temporal)   Ht 6' 3" (1 905 m)   Wt (!) 138 kg (305 lb)   SpO2 96%   BMI 38 12 kg/m²     Recent Results (from the past 1344 hour(s))   Uric acid    Collection Time: 02/22/23 12:51 PM   Result Value Ref Range    Uric Acid 6 7 4 0 - 8 0 mg/dL Physical Exam  Constitutional:       Appearance: Normal appearance  HENT:      Head: Normocephalic  Right Ear: External ear normal       Left Ear: External ear normal       Nose: No congestion  Mouth/Throat:      Mouth: Mucous membranes are moist       Pharynx: Oropharynx is clear  No oropharyngeal exudate or posterior oropharyngeal erythema  Eyes:      Extraocular Movements: Extraocular movements intact  Conjunctiva/sclera: Conjunctivae normal    Cardiovascular:      Rate and Rhythm: Normal rate and regular rhythm  Heart sounds: Normal heart sounds  No murmur heard  Pulmonary:      Effort: Pulmonary effort is normal       Breath sounds: Normal breath sounds  No wheezing or rales  Abdominal:      General: Bowel sounds are normal  There is no distension  Palpations: Abdomen is soft  Tenderness: There is no abdominal tenderness  Musculoskeletal:      Cervical back: Neck supple  Right lower leg: No edema  Left lower leg: No edema  Lymphadenopathy:      Cervical: No cervical adenopathy  Skin:     General: Skin is warm  Neurological:      General: No focal deficit present  Mental Status: He is alert and oriented to person, place, and time

## 2023-04-25 PROBLEM — Z12.11 SCREENING FOR COLON CANCER: Status: RESOLVED | Noted: 2023-01-24 | Resolved: 2023-04-25

## 2023-06-02 ENCOUNTER — OFFICE VISIT (OUTPATIENT)
Dept: INTERNAL MEDICINE CLINIC | Facility: CLINIC | Age: 53
End: 2023-06-02

## 2023-06-02 VITALS
WEIGHT: 310 LBS | HEIGHT: 75 IN | DIASTOLIC BLOOD PRESSURE: 82 MMHG | TEMPERATURE: 98.3 F | BODY MASS INDEX: 38.54 KG/M2 | HEART RATE: 87 BPM | OXYGEN SATURATION: 98 % | SYSTOLIC BLOOD PRESSURE: 136 MMHG

## 2023-06-02 DIAGNOSIS — R09.81 SINUS CONGESTION: ICD-10-CM

## 2023-06-02 DIAGNOSIS — J30.1 SEASONAL ALLERGIC RHINITIS DUE TO POLLEN: ICD-10-CM

## 2023-06-02 DIAGNOSIS — M51.16 LUMBAR DISC HERNIATION WITH RADICULOPATHY: ICD-10-CM

## 2023-06-02 DIAGNOSIS — J45.21 MILD INTERMITTENT ASTHMA WITH ACUTE EXACERBATION: Primary | ICD-10-CM

## 2023-06-02 DIAGNOSIS — J40 BRONCHITIS: ICD-10-CM

## 2023-06-02 DIAGNOSIS — M10.062 ACUTE IDIOPATHIC GOUT OF LEFT KNEE: ICD-10-CM

## 2023-06-02 RX ORDER — ALBUTEROL SULFATE 90 UG/1
2 AEROSOL, METERED RESPIRATORY (INHALATION) EVERY 6 HOURS PRN
Qty: 8 G | Refills: 1 | Status: SHIPPED | OUTPATIENT
Start: 2023-06-02

## 2023-06-02 RX ORDER — PREDNISONE 50 MG/1
50 TABLET ORAL DAILY
Qty: 7 TABLET | Refills: 0 | Status: SHIPPED | OUTPATIENT
Start: 2023-06-02 | End: 2023-06-09

## 2023-06-02 NOTE — PROGRESS NOTES
Assessment/Plan:      Depression Screening and Follow-up Plan: Patient was screened for depression during today's encounter  They screened negative with a PHQ-2 score of 0             1  Mild intermittent asthma with acute exacerbation  -     predniSONE 50 mg tablet; Take 1 tablet (50 mg total) by mouth daily for 7 days    2  Seasonal allergic rhinitis due to pollen    3  Acute idiopathic gout of left knee    4  Lumbar disc herniation with radiculopathy    5  Sinus congestion  -     albuterol (PROVENTIL HFA,VENTOLIN HFA) 90 mcg/act inhaler; Inhale 2 puffs every 6 (six) hours as needed for wheezing    6  Bronchitis  -     albuterol (PROVENTIL HFA,VENTOLIN HFA) 90 mcg/act inhaler; Inhale 2 puffs every 6 (six) hours as needed for wheezing           Subjective:      Patient ID: Mandi Medina is a 48 y o  male  Cough, dry, shortness of breath,      The following portions of the patient's history were reviewed and updated as appropriate: He  has a past medical history of Asthma, Asthmatic bronchitis, Body mass index 34 0-34 9, adult, Convulsions ([address] ), Diverticulitis, Diverticulosis, Gastric ulcer, Gout, Left shoulder pain, Lumbar disc herniation with radiculopathy, Osteoarthritis, Pharyngitis, Pulmonary embolism ([address] ) (08/15/2012), Right knee pain, Rotator cuff syndrome of left shoulder, Routine general medical examination at a health care facility, SOB (shortness of breath), Spontaneous rupture of tendon of left shoulder, Syncope and collapse, and Vestibular neuritis    He   Patient Active Problem List    Diagnosis Date Noted   • Seasonal allergic rhinitis due to pollen 06/02/2023   • Acute idiopathic gout of left foot 01/24/2023   • Fungal infection 01/13/2023   • Acute idiopathic gout of left knee 12/20/2022   • Acute deep vein thrombosis (DVT) of left peroneal vein ([address] ) 12/20/2022   • Tracheitis 05/24/2022   • Mild intermittent asthma without complication 61/26/1744   • Impaired fasting blood sugar 01/25/2022 • Mixed hyperlipidemia 01/25/2022   • Sacroiliitis (HCC)    • Chronic pain syndrome 07/08/2021   • Lumbar spondylosis 06/03/2021   • Lumbar disc herniation with radiculopathy    • Gastroesophageal reflux disease without esophagitis 01/12/2021   • Diverticulosis large intestine w/o perforation or abscess w/o bleeding 10/05/2020   • Body mass index 37 0-37 9, adult 10/05/2020   • Gout    • Asthma    • Diverticulitis      He  has a past surgical history that includes Sinus surgery; EGD AND COLONOSCOPY (01/31/2011); and Colon surgery (08/08/2012)  His family history includes Hyperlipidemia in his mother; Hypertension in his mother  He  reports that he has never smoked  He has never used smokeless tobacco  He reports that he does not currently use alcohol  He reports that he does not use drugs  Current Outpatient Medications   Medication Sig Dispense Refill   • albuterol (PROVENTIL HFA,VENTOLIN HFA) 90 mcg/act inhaler Inhale 2 puffs every 6 (six) hours as needed for wheezing 8 g 1   • allopurinol (ZYLOPRIM) 100 mg tablet Take 2 tablets (200 mg total) by mouth daily 60 tablet 3   • diclofenac (VOLTAREN) 75 mg EC tablet Take 1 tablet (75 mg total) by mouth 2 (two) times a day (Patient taking differently: Take 75 mg by mouth if needed) 30 tablet 0   • predniSONE 50 mg tablet Take 1 tablet (50 mg total) by mouth daily for 7 days 7 tablet 0   • colchicine (COLCRYS) 0 6 mg tablet Take 1 tablet (0 6 mg total) by mouth 2 (two) times a day (Patient not taking: Reported on 2/24/2023) 20 tablet 0   • cyclobenzaprine (FLEXERIL) 10 mg tablet Take 1 tablet (10 mg total) by mouth 3 (three) times a day as needed for muscle spasms (Patient not taking: Reported on 2/24/2023) 15 tablet 0     No current facility-administered medications for this visit       Current Outpatient Medications on File Prior to Visit   Medication Sig   • allopurinol (ZYLOPRIM) 100 mg tablet Take 2 tablets (200 mg total) by mouth daily   • diclofenac (VOLTAREN) "75 mg EC tablet Take 1 tablet (75 mg total) by mouth 2 (two) times a day (Patient taking differently: Take 75 mg by mouth if needed)   • [DISCONTINUED] albuterol (PROVENTIL HFA,VENTOLIN HFA) 90 mcg/act inhaler Inhale 2 puffs every 6 (six) hours as needed for wheezing   • colchicine (COLCRYS) 0 6 mg tablet Take 1 tablet (0 6 mg total) by mouth 2 (two) times a day (Patient not taking: Reported on 2/24/2023)   • cyclobenzaprine (FLEXERIL) 10 mg tablet Take 1 tablet (10 mg total) by mouth 3 (three) times a day as needed for muscle spasms (Patient not taking: Reported on 2/24/2023)   • [DISCONTINUED] apixaban (Eliquis) 5 mg Take 1 tablet (5 mg total) by mouth 2 (two) times a day (Patient not taking: Reported on 6/2/2023)   • [DISCONTINUED] oxyCODONE-acetaminophen (Percocet) 5-325 mg per tablet Take 1 tablet by mouth every 8 (eight) hours as needed for moderate pain Max Daily Amount: 3 tablets (Patient not taking: Reported on 2/24/2023)     No current facility-administered medications on file prior to visit  He is allergic to cefazolin, metronidazole, and levofloxacin       Review of Systems   Constitutional: Negative for chills and fever  HENT: Negative for congestion, ear pain and sore throat  Eyes: Negative for pain  Respiratory: Positive for cough and shortness of breath  Cardiovascular: Negative for chest pain and leg swelling  Gastrointestinal: Negative for abdominal pain, nausea and vomiting  Endocrine: Negative for polyuria  Genitourinary: Negative for difficulty urinating, frequency and urgency  Musculoskeletal: Negative for arthralgias and back pain  Skin: Negative for rash  Neurological: Negative for weakness and headaches  Psychiatric/Behavioral: Negative for sleep disturbance  The patient is not nervous/anxious            Objective:      /82 (BP Location: Left arm, Patient Position: Sitting, Cuff Size: Large)   Pulse 87   Temp 98 3 °F (36 8 °C) (Temporal)   Ht 6' 3\" (1 905 " m)   Wt (!) 141 kg (310 lb)   SpO2 98%   BMI 38 75 kg/m²     No results found for this or any previous visit (from the past 1344 hour(s))  Physical Exam  Constitutional:       Appearance: Normal appearance  HENT:      Head: Normocephalic  Right Ear: External ear normal       Left Ear: External ear normal       Nose: Nose normal  No congestion  Mouth/Throat:      Mouth: Mucous membranes are moist       Pharynx: Oropharynx is clear  No oropharyngeal exudate or posterior oropharyngeal erythema  Eyes:      Extraocular Movements: Extraocular movements intact  Conjunctiva/sclera: Conjunctivae normal    Cardiovascular:      Rate and Rhythm: Normal rate and regular rhythm  Heart sounds: Normal heart sounds  No murmur heard  Pulmonary:      Effort: Pulmonary effort is normal       Breath sounds: Wheezing present  No rales  Abdominal:      General: Bowel sounds are normal  There is no distension  Palpations: Abdomen is soft  Tenderness: There is no abdominal tenderness  Musculoskeletal:         General: Normal range of motion  Cervical back: Normal range of motion and neck supple  Right lower leg: No edema  Left lower leg: No edema  Lymphadenopathy:      Cervical: No cervical adenopathy  Skin:     General: Skin is warm  Neurological:      General: No focal deficit present  Mental Status: He is alert and oriented to person, place, and time

## 2023-07-07 DIAGNOSIS — M10.062 ACUTE IDIOPATHIC GOUT OF LEFT KNEE: ICD-10-CM

## 2023-07-07 RX ORDER — ALLOPURINOL 100 MG/1
200 TABLET ORAL DAILY
Qty: 60 TABLET | Refills: 5 | Status: SHIPPED | OUTPATIENT
Start: 2023-07-07

## 2023-07-07 NOTE — TELEPHONE ENCOUNTER
Refill Request (Patient has been out for about a week.  Please fill today)    Allopurinol     Ph[facility]acy: [facility] [address]     [place]: 6/2/23

## 2023-08-08 DIAGNOSIS — R09.81 SINUS CONGESTION: ICD-10-CM

## 2023-08-08 DIAGNOSIS — J40 BRONCHITIS: ICD-10-CM

## 2023-08-08 RX ORDER — ALBUTEROL SULFATE 90 UG/1
AEROSOL, METERED RESPIRATORY (INHALATION)
Refills: 1 | OUTPATIENT
Start: 2023-08-08

## 2023-08-10 DIAGNOSIS — M10.062 ACUTE IDIOPATHIC GOUT OF LEFT KNEE: ICD-10-CM

## 2023-08-10 RX ORDER — ALLOPURINOL 100 MG/1
200 TABLET ORAL DAILY
Qty: 180 TABLET | Refills: 2 | OUTPATIENT
Start: 2023-08-10

## 2023-08-11 ENCOUNTER — OFFICE VISIT (OUTPATIENT)
Dept: INTERNAL MEDICINE CLINIC | Facility: CLINIC | Age: 53
End: 2023-08-11
Payer: COMMERCIAL

## 2023-08-11 VITALS
BODY MASS INDEX: 39.17 KG/M2 | DIASTOLIC BLOOD PRESSURE: 84 MMHG | SYSTOLIC BLOOD PRESSURE: 136 MMHG | OXYGEN SATURATION: 97 % | TEMPERATURE: 98 F | HEIGHT: 75 IN | HEART RATE: 92 BPM | WEIGHT: 315 LBS

## 2023-08-11 DIAGNOSIS — M10.072 ACUTE IDIOPATHIC GOUT OF LEFT FOOT: ICD-10-CM

## 2023-08-11 DIAGNOSIS — S46.811A STRAIN OF RIGHT TRAPEZIUS MUSCLE, INITIAL ENCOUNTER: Primary | ICD-10-CM

## 2023-08-11 DIAGNOSIS — Z12.11 SPECIAL SCREENING FOR MALIGNANT NEOPLASMS, COLON: ICD-10-CM

## 2023-08-11 DIAGNOSIS — M12.812 LEFT ROTATOR CUFF TEAR ARTHROPATHY: ICD-10-CM

## 2023-08-11 DIAGNOSIS — M75.102 LEFT ROTATOR CUFF TEAR ARTHROPATHY: ICD-10-CM

## 2023-08-11 DIAGNOSIS — M51.16 LUMBAR DISC HERNIATION WITH RADICULOPATHY: ICD-10-CM

## 2023-08-11 PROCEDURE — 99213 OFFICE O/P EST LOW 20 MIN: CPT | Performed by: INTERNAL MEDICINE

## 2023-08-11 RX ORDER — DICLOFENAC SODIUM 75 MG/1
75 TABLET, DELAYED RELEASE ORAL 2 TIMES DAILY
Qty: 30 TABLET | Refills: 0 | Status: SHIPPED | OUTPATIENT
Start: 2023-08-11

## 2023-08-11 RX ORDER — CYCLOBENZAPRINE HCL 10 MG
10 TABLET ORAL 3 TIMES DAILY PRN
Qty: 15 TABLET | Refills: 0 | Status: SHIPPED | OUTPATIENT
Start: 2023-08-11

## 2023-08-11 NOTE — PROGRESS NOTES
Assessment/Plan:             1. Strain of right trapezius muscle, initial encounter  Comments:  b/l    2. Left rotator cuff tear arthropathy  -     XR shoulder 2+ vw left; Future; Expected date: 08/11/2023  -     MRI shoulder left wo contrast; Future; Expected date: 08/11/2023    3. Lumbar disc herniation with radiculopathy  -     cyclobenzaprine (FLEXERIL) 10 mg tablet; Take 1 tablet (10 mg total) by mouth 3 (three) times a day as needed for muscle spasms    4. Acute idiopathic gout of left foot  -     diclofenac (VOLTAREN) 75 mg EC tablet; Take 1 tablet (75 mg total) by mouth 2 (two) times a day    5. Special screening for malignant neoplasms, colon  -     Ambulatory Referral to Gastroenterology; Future           Subjective:      Patient ID: Matthew Beltran is a 48 y.o. male. Neck pain, upper back pain, doing [employer] Exercise program, left shoulder pain, did hear snapping noise, during exercise      The following portions of the patient's history were reviewed and updated as appropriate: He  has a past medical history of Asthma, Asthmatic bronchitis, Body mass index 34.0-34.9, adult, Convulsions ([address]), Diverticulitis, Diverticulosis, Gastric ulcer, Gout, Left shoulder pain, Lumbar disc herniation with radiculopathy, Osteoarthritis, Pharyngitis, Pulmonary embolism ([address]) (08/15/2012), Right knee pain, Rotator cuff syndrome of left shoulder, Routine general medical examination at a health care facility, SOB (shortness of breath), Spontaneous rupture of tendon of left shoulder, Syncope and collapse, and Vestibular neuritis.   He   Patient Active Problem List    Diagnosis Date Noted   • Seasonal allergic rhinitis due to pollen 06/02/2023   • Acute idiopathic gout of left foot 01/24/2023   • Fungal infection 01/13/2023   • Acute idiopathic gout of left knee 12/20/2022   • Acute deep vein thrombosis (DVT) of left peroneal vein ([address]) 12/20/2022   • Tracheitis 05/24/2022   • Mild intermittent asthma without complication 01/25/2022   • Impaired fasting blood sugar 01/25/2022   • Mixed hyperlipidemia 01/25/2022   • Sacroiliitis (HCC)    • Chronic pain syndrome 07/08/2021   • Lumbar spondylosis 06/03/2021   • Lumbar disc herniation with radiculopathy    • Gastroesophageal reflux disease without esophagitis 01/12/2021   • Diverticulosis large intestine w/o perforation or abscess w/o bleeding 10/05/2020   • Body mass index 37.0-37.9, adult 10/05/2020   • Gout    • Asthma    • Diverticulitis      He  has a past surgical history that includes Sinus surgery; EGD AND COLONOSCOPY (01/31/2011); and Colon surgery (08/08/2012). His family history includes Hyperlipidemia in his mother; Hypertension in his mother. He  reports that he has never smoked. He has never used smokeless tobacco. He reports that he does not currently use alcohol. He reports that he does not use drugs. Current Outpatient Medications   Medication Sig Dispense Refill   • albuterol (PROVENTIL HFA,VENTOLIN HFA) 90 mcg/act inhaler Inhale 2 puffs every 6 (six) hours as needed for wheezing 8 g 1   • allopurinol (ZYLOPRIM) 100 mg tablet Take 2 tablets (200 mg total) by mouth daily 60 tablet 5   • cyclobenzaprine (FLEXERIL) 10 mg tablet Take 1 tablet (10 mg total) by mouth 3 (three) times a day as needed for muscle spasms 15 tablet 0   • diclofenac (VOLTAREN) 75 mg EC tablet Take 1 tablet (75 mg total) by mouth 2 (two) times a day 30 tablet 0   • colchicine (COLCRYS) 0.6 mg tablet Take 1 tablet (0.6 mg total) by mouth 2 (two) times a day (Patient not taking: Reported on 2/24/2023) 20 tablet 0     No current facility-administered medications for this visit.      Current Outpatient Medications on File Prior to Visit   Medication Sig   • albuterol (PROVENTIL HFA,VENTOLIN HFA) 90 mcg/act inhaler Inhale 2 puffs every 6 (six) hours as needed for wheezing   • allopurinol (ZYLOPRIM) 100 mg tablet Take 2 tablets (200 mg total) by mouth daily   • [DISCONTINUED] cyclobenzaprine (FLEXERIL) 10 mg tablet Take 1 tablet (10 mg total) by mouth 3 (three) times a day as needed for muscle spasms   • [DISCONTINUED] diclofenac (VOLTAREN) 75 mg EC tablet Take 1 tablet (75 mg total) by mouth 2 (two) times a day   • colchicine (COLCRYS) 0.6 mg tablet Take 1 tablet (0.6 mg total) by mouth 2 (two) times a day (Patient not taking: Reported on 2/24/2023)     No current facility-administered medications on file prior to visit. He is allergic to cefazolin, metronidazole, and levofloxacin. .    Review of Systems   Constitutional: Negative for chills and fever. HENT: Negative for congestion, ear pain and sore throat. Eyes: Negative for pain. Respiratory: Negative for cough and shortness of breath. Cardiovascular: Negative for chest pain and leg swelling. Gastrointestinal: Negative for abdominal pain, nausea and vomiting. Endocrine: Negative for polyuria. Genitourinary: Negative for difficulty urinating, frequency and urgency. Musculoskeletal: Positive for arthralgias and back pain. Skin: Negative for rash. Neurological: Negative for weakness and headaches. Psychiatric/Behavioral: Negative for sleep disturbance. The patient is not nervous/anxious. Objective:      /84 (BP Location: Left arm, Patient Position: Sitting, Cuff Size: Large)   Pulse 92   Temp 98 °F (36.7 °C) (Temporal)   Ht 6' 3" (1.905 m)   Wt (!) 147 kg (323 lb)   SpO2 97%   BMI 40.37 kg/m²     No results found for this or any previous visit (from the past 1344 hour(s)). Physical Exam  Constitutional:       Appearance: Normal appearance. HENT:      Head: Normocephalic. Right Ear: Tympanic membrane, ear canal and external ear normal.      Left Ear: Tympanic membrane, ear canal and external ear normal.      Nose: Nose normal. No congestion. Mouth/Throat:      Mouth: Mucous membranes are moist.      Pharynx: Oropharynx is clear. No oropharyngeal exudate or posterior oropharyngeal erythema.    Eyes: Extraocular Movements: Extraocular movements intact. Conjunctiva/sclera: Conjunctivae normal.      Pupils: Pupils are equal, round, and reactive to light. Cardiovascular:      Rate and Rhythm: Normal rate and regular rhythm. Heart sounds: Normal heart sounds. No murmur heard. Pulmonary:      Effort: Pulmonary effort is normal.      Breath sounds: Normal breath sounds. No wheezing or rales. Abdominal:      General: Bowel sounds are normal. There is no distension. Palpations: Abdomen is soft. Tenderness: There is no abdominal tenderness. Musculoskeletal:      Cervical back: Normal range of motion and neck supple. Right lower leg: No edema. Left lower leg: No edema. Comments: Neck bilateral paraspinous spasm and tenderness present, movement painful and restricted due to pain, upper back exam trapezius muscle tenderness present    Left shoulder exam tenderness present anterolaterally, movement painful and restricted due to pain   Lymphadenopathy:      Cervical: No cervical adenopathy. Skin:     General: Skin is warm. Neurological:      General: No focal deficit present. Mental Status: He is alert and oriented to person, place, and time.

## 2023-08-31 ENCOUNTER — TELEPHONE (OUTPATIENT)
Dept: INTERNAL MEDICINE CLINIC | Facility: CLINIC | Age: 53
End: 2023-08-31

## 2023-08-31 DIAGNOSIS — M51.16 LUMBAR DISC HERNIATION WITH RADICULOPATHY: Primary | ICD-10-CM

## 2023-08-31 DIAGNOSIS — M10.072 ACUTE IDIOPATHIC GOUT OF LEFT FOOT: ICD-10-CM

## 2023-08-31 RX ORDER — METHOCARBAMOL 750 MG/1
750 TABLET, FILM COATED ORAL EVERY 6 HOURS PRN
Qty: 40 TABLET | Refills: 0 | Status: SHIPPED | OUTPATIENT
Start: 2023-08-31

## 2023-08-31 NOTE — TELEPHONE ENCOUNTER
patient called and can not get into office stated you usually call him he hurt himself playing golf and has back pain and can not get on his feet right now

## 2023-09-08 ENCOUNTER — TELEPHONE (OUTPATIENT)
Dept: INTERNAL MEDICINE CLINIC | Facility: CLINIC | Age: 53
End: 2023-09-08

## 2023-09-08 DIAGNOSIS — M10.072 ACUTE IDIOPATHIC GOUT OF LEFT FOOT: Primary | ICD-10-CM

## 2023-09-08 RX ORDER — PREDNISONE 50 MG/1
50 TABLET ORAL DAILY
Qty: 7 TABLET | Refills: 0 | Status: SHIPPED | OUTPATIENT
Start: 2023-09-08 | End: 2023-09-15

## 2023-09-08 NOTE — TELEPHONE ENCOUNTER
patient called and wanted to let you know how he was doing after his appointment , his back got a little better but now ifs feeling worse his gout is active up again in his ankle he was requesting steroids  [facility] [place] [facility] and back pain

## 2023-09-12 DIAGNOSIS — M10.062 ACUTE IDIOPATHIC GOUT OF LEFT KNEE: ICD-10-CM

## 2023-09-12 RX ORDER — ALLOPURINOL 100 MG/1
200 TABLET ORAL DAILY
Qty: 180 TABLET | Refills: 2 | OUTPATIENT
Start: 2023-09-12

## 2023-11-22 ENCOUNTER — OFFICE VISIT (OUTPATIENT)
Dept: INTERNAL MEDICINE CLINIC | Facility: CLINIC | Age: 53
End: 2023-11-22
Payer: COMMERCIAL

## 2023-11-22 VITALS
DIASTOLIC BLOOD PRESSURE: 86 MMHG | SYSTOLIC BLOOD PRESSURE: 136 MMHG | BODY MASS INDEX: 39.17 KG/M2 | OXYGEN SATURATION: 98 % | HEIGHT: 75 IN | TEMPERATURE: 98.2 F | HEART RATE: 92 BPM | WEIGHT: 315 LBS

## 2023-11-22 DIAGNOSIS — Z23 ENCOUNTER FOR IMMUNIZATION: ICD-10-CM

## 2023-11-22 DIAGNOSIS — Z12.11 SCREENING FOR COLON CANCER: ICD-10-CM

## 2023-11-22 DIAGNOSIS — N64.9 BREAST DISORDER: ICD-10-CM

## 2023-11-22 DIAGNOSIS — M10.072 ACUTE IDIOPATHIC GOUT OF LEFT FOOT: Primary | ICD-10-CM

## 2023-11-22 DIAGNOSIS — Z00.00 ANNUAL PHYSICAL EXAM: ICD-10-CM

## 2023-11-22 PROCEDURE — 90686 IIV4 VACC NO PRSV 0.5 ML IM: CPT

## 2023-11-22 PROCEDURE — 90471 IMMUNIZATION ADMIN: CPT

## 2023-11-22 PROCEDURE — 99396 PREV VISIT EST AGE 40-64: CPT | Performed by: INTERNAL MEDICINE

## 2023-11-22 PROCEDURE — 99214 OFFICE O/P EST MOD 30 MIN: CPT | Performed by: INTERNAL MEDICINE

## 2023-11-22 RX ORDER — PREDNISONE 50 MG/1
50 TABLET ORAL DAILY
Qty: 7 TABLET | Refills: 0 | Status: SHIPPED | OUTPATIENT
Start: 2023-11-22 | End: 2023-11-29

## 2023-11-22 RX ORDER — DICLOFENAC SODIUM 75 MG/1
75 TABLET, DELAYED RELEASE ORAL 2 TIMES DAILY
Qty: 30 TABLET | Refills: 0 | Status: SHIPPED | OUTPATIENT
Start: 2023-11-22

## 2023-11-22 NOTE — PROGRESS NOTES
Assessment/Plan:             1. Acute idiopathic gout of left foot  -     diclofenac (VOLTAREN) 75 mg EC tablet; Take 1 tablet (75 mg total) by mouth 2 (two) times a day  -     predniSONE 50 mg tablet; Take 1 tablet (50 mg total) by mouth daily for 7 days    2. Screening for colon cancer  -     Ambulatory Referral to Gastroenterology; Future    3. Encounter for immunization  -     influenza vaccine, quadrivalent, 0.5 mL, preservative-free, for adult and pediatric patients 6 mos+ (AFLURIA, FLUARIX, FLULAVAL, FLUZONE)    4. Breast disorder  -     Mammo diagnostic left w cad; Future; Expected date: 11/22/2023  -     US breast left limited (diagnostic); Future; Expected date: 11/22/2023    5. Annual physical exam           Subjective:      Patient ID: Jen Peterson is a 48 y.o. male. Noticed a left breast lump, minimal tenderness, also left foot pain, feel his gout is acting up, needs a physical        The following portions of the patient's history were reviewed and updated as appropriate: He  has a past medical history of Asthma, Asthmatic bronchitis, Body mass index 34.0-34.9, adult, Convulsions ([address]), Diverticulitis, Diverticulosis, Gastric ulcer, Gout, Left shoulder pain, Lumbar disc herniation with radiculopathy, Osteoarthritis, Pharyngitis, Pulmonary embolism ([address]) (08/15/2012), Right knee pain, Rotator cuff syndrome of left shoulder, Routine general medical examination at a health care facility, SOB (shortness of breath), Spontaneous rupture of tendon of left shoulder, Syncope and collapse, and Vestibular neuritis.   He   Patient Active Problem List    Diagnosis Date Noted    Seasonal allergic rhinitis due to pollen 06/02/2023    Acute idiopathic gout of left foot 01/24/2023    Fungal infection 01/13/2023    Acute idiopathic gout of left knee 12/20/2022    Acute deep vein thrombosis (DVT) of left peroneal vein ([address]) 12/20/2022    Tracheitis 05/24/2022    Mild intermittent asthma without complication 01/25/2022    Impaired fasting blood sugar 01/25/2022    Mixed hyperlipidemia 01/25/2022    Sacroiliitis (HCC)     Chronic pain syndrome 07/08/2021    Lumbar spondylosis 06/03/2021    Lumbar disc herniation with radiculopathy     Gastroesophageal reflux disease without esophagitis 01/12/2021    Diverticulosis large intestine w/o perforation or abscess w/o bleeding 10/05/2020    Body mass index 37.0-37.9, adult 10/05/2020    Gout     Asthma     Diverticulitis      He  has a past surgical history that includes Sinus surgery; EGD AND COLONOSCOPY (01/31/2011); and Colon surgery (08/08/2012). His family history includes Hyperlipidemia in his mother; Hypertension in his mother. He  reports that he has never smoked. He has never used smokeless tobacco. He reports that he does not currently use alcohol. He reports that he does not use drugs. Current Outpatient Medications   Medication Sig Dispense Refill    allopurinol (ZYLOPRIM) 100 mg tablet Take 2 tablets (200 mg total) by mouth daily 60 tablet 5    diclofenac (VOLTAREN) 75 mg EC tablet Take 1 tablet (75 mg total) by mouth 2 (two) times a day 30 tablet 0    predniSONE 50 mg tablet Take 1 tablet (50 mg total) by mouth daily for 7 days 7 tablet 0    albuterol (PROVENTIL HFA,VENTOLIN HFA) 90 mcg/act inhaler Inhale 2 puffs every 6 (six) hours as needed for wheezing (Patient not taking: Reported on 11/22/2023) 8 g 1    colchicine (COLCRYS) 0.6 mg tablet Take 1 tablet (0.6 mg total) by mouth 2 (two) times a day (Patient not taking: Reported on 2/24/2023) 20 tablet 0    methocarbamol (Robaxin-750) 750 mg tablet Take 1 tablet (750 mg total) by mouth every 6 (six) hours as needed for muscle spasms (Patient not taking: Reported on 11/22/2023) 40 tablet 0     No current facility-administered medications for this visit.      Current Outpatient Medications on File Prior to Visit   Medication Sig    allopurinol (ZYLOPRIM) 100 mg tablet Take 2 tablets (200 mg total) by mouth daily [DISCONTINUED] diclofenac (VOLTAREN) 75 mg EC tablet Take 1 tablet (75 mg total) by mouth 2 (two) times a day    albuterol (PROVENTIL HFA,VENTOLIN HFA) 90 mcg/act inhaler Inhale 2 puffs every 6 (six) hours as needed for wheezing (Patient not taking: Reported on 11/22/2023)    colchicine (COLCRYS) 0.6 mg tablet Take 1 tablet (0.6 mg total) by mouth 2 (two) times a day (Patient not taking: Reported on 2/24/2023)    methocarbamol (Robaxin-750) 750 mg tablet Take 1 tablet (750 mg total) by mouth every 6 (six) hours as needed for muscle spasms (Patient not taking: Reported on 11/22/2023)     No current facility-administered medications on file prior to visit. He is allergic to cefazolin, metronidazole, and levofloxacin. .    Review of Systems   Constitutional:  Negative for chills and fever. HENT:  Negative for congestion, ear pain and sore throat. Eyes:  Negative for pain. Respiratory:  Negative for cough and shortness of breath. Cardiovascular:  Negative for chest pain and leg swelling. Gastrointestinal:  Negative for abdominal pain, nausea and vomiting. Endocrine: Negative for polyuria. Genitourinary:  Negative for difficulty urinating, frequency and urgency. Musculoskeletal:  Positive for arthralgias and back pain. Skin:  Negative for rash. Neurological:  Negative for weakness and headaches. Psychiatric/Behavioral:  Negative for sleep disturbance. The patient is not nervous/anxious. Objective:      /86 (BP Location: Left arm, Patient Position: Sitting, Cuff Size: Standard)   Pulse 92   Temp 98.2 °F (36.8 °C) (Temporal)   Ht 6' 3" (1.905 m)   Wt (!) 147 kg (325 lb)   SpO2 98%   BMI 40.62 kg/m²     No results found for this or any previous visit (from the past 1344 hour(s)). Physical Exam  Constitutional:       Appearance: Normal appearance. HENT:      Head: Normocephalic.       Right Ear: Tympanic membrane, ear canal and external ear normal.      Left Ear: Tympanic membrane, ear canal and external ear normal.      Nose: Nose normal. No congestion. Mouth/Throat:      Mouth: Mucous membranes are moist.      Pharynx: Oropharynx is clear. No oropharyngeal exudate or posterior oropharyngeal erythema. Eyes:      Extraocular Movements: Extraocular movements intact. Conjunctiva/sclera: Conjunctivae normal.   Cardiovascular:      Rate and Rhythm: Normal rate and regular rhythm. Heart sounds: Normal heart sounds. No murmur heard. Pulmonary:      Effort: Pulmonary effort is normal.      Breath sounds: Normal breath sounds. No wheezing or rales. Abdominal:      General: Abdomen is flat. There is no distension. Palpations: Abdomen is soft. Tenderness: There is no abdominal tenderness. Musculoskeletal:      Cervical back: Normal range of motion and neck supple. Right lower leg: No edema. Left lower leg: No edema. Lymphadenopathy:      Cervical: No cervical adenopathy. Skin:     General: Skin is warm. Neurological:      General: No focal deficit present. Mental Status: He is alert and oriented to person, place, and time.

## 2023-11-22 NOTE — PATIENT INSTRUCTIONS
Wellness Visit for Adults   AMBULATORY CARE:   A wellness visit  is when you see your healthcare provider to get screened for health problems. Your healthcare provider will also give you advice on how to stay healthy. Write down your questions so you remember to ask them. Ask your healthcare provider how often you should have a wellness visit. What happens at a wellness visit:  Your healthcare provider will ask about your health, and your family history of health problems. This includes high blood pressure, heart disease, and cancer. He or she will ask if you have symptoms that concern you, if you smoke, and about your mood. You may also be asked about your intake of medicines, supplements, food, and alcohol. Any of the following may be done: Your weight  will be checked. Your height may also be checked so your body mass index (BMI) can be calculated. Your BMI shows if you are at a healthy weight. Your blood pressure  and heart rate will be checked. Your temperature may also be checked. Blood and urine tests  may be done. Blood tests may be done to check your cholesterol levels. Abnormal cholesterol levels increase your risk for heart disease and stroke. You may also need a blood or urine test to check for diabetes if you are at increased risk. Urine tests may be done to look for signs of an infection or kidney disease. A physical exam  includes checking your heartbeat and lungs with a stethoscope. Your healthcare provider may also check your skin to look for sun damage. Screening tests  may be recommended. A screening test is done to check for diseases that may not cause symptoms. The screening tests you may need depend on your age, gender, family history, and lifestyle habits. For example, colorectal screening may be recommended if you are 48years old or older. Screening tests you need if you are a woman:   A Pap smear  is used to screen for cervical cancer.  Pap smears are usually done every 3 to 5 years depending on your age. You may need them more often if you have had abnormal Pap smear test results in the past. Ask your healthcare provider how often you should have a Pap smear. A mammogram  is an x-ray of your breasts to screen for breast cancer. Experts recommend mammograms every 2 years starting at age 48 years. You may need a mammogram at age 52 years or younger if you have an increased risk for breast cancer. Talk to your healthcare provider about when you should start having mammograms and how often you need them. Vaccines you may need:   Get an influenza vaccine  every year. The influenza vaccine protects you from the flu. Several types of viruses cause the flu. The viruses change over time, so new vaccines are made each year. Get a tetanus-diphtheria (Td) booster vaccine  every 10 years. This vaccine protects you against tetanus and diphtheria. Tetanus is a severe infection that may cause painful muscle spasms and lockjaw. Diphtheria is a severe bacterial infection that causes a thick covering in the back of your mouth and throat. Get a human papillomavirus (HPV) vaccine  if you are female and aged 23 to 32 or male 23 to 24 and never received it. This vaccine protects you from HPV infection. HPV is the most common infection spread by sexual contact. HPV may also cause vaginal, penile, and anal cancers. Get a pneumococcal vaccine  if you are aged 72 years or older. The pneumococcal vaccine is an injection given to protect you from pneumococcal disease. Pneumococcal disease is an infection caused by pneumococcal bacteria. The infection may cause pneumonia, meningitis, or an ear infection. Get a shingles vaccine  if you are 60 or older, even if you have had shingles before. The shingles vaccine is an injection to protect you from the varicella-zoster virus. This is the same virus that causes chickenpox.  Shingles is a painful rash that develops in people who had chickenpox or have been exposed to the virus. How to eat healthy:  My Plate is a model for planning healthy meals. It shows the types and amounts of foods that should go on your plate. Fruits and vegetables make up about half of your plate, and grains and protein make up the other half. A serving of dairy is included on the side of your plate. The amount of calories and serving sizes you need depends on your age, gender, weight, and height. Examples of healthy foods are listed below:  Eat a variety of vegetables  such as dark green, red, and orange vegetables. You can also include canned vegetables low in sodium (salt) and frozen vegetables without added butter or sauces. Eat a variety of fresh fruits , canned fruit in 100% juice, frozen fruit, and dried fruit. Include whole grains. At least half of the grains you eat should be whole grains. Examples include whole-wheat bread, wheat pasta, brown rice, and whole-grain cereals such as oatmeal.    Eat a variety of protein foods such as seafood (fish and shellfish), lean meat, and poultry without skin (turkey and chicken). Examples of lean meats include pork leg, shoulder, or tenderloin, and beef round, sirloin, tenderloin, and extra lean ground beef. Other protein foods include eggs and egg substitutes, beans, peas, soy products, nuts, and seeds. Choose low-fat dairy products such as skim or 1% milk or low-fat yogurt, cheese, and cottage cheese. Limit unhealthy fats  such as butter, hard margarine, and shortening. Exercise:  Exercise at least 30 minutes per day on most days of the week. Some examples of exercise include walking, biking, dancing, and swimming. You can also fit in more physical activity by taking the stairs instead of the elevator or parking farther away from stores. Include muscle strengthening activities 2 days each week. Regular exercise provides many health benefits.  It helps you manage your weight, and decreases your risk for type 2 diabetes, heart disease, stroke, and high blood pressure. Exercise can also help improve your mood. Ask your healthcare provider about the best exercise plan for you. General health and safety guidelines:   Do not smoke. Nicotine and other chemicals in cigarettes and cigars can cause lung damage. Ask your healthcare provider for information if you currently smoke and need help to quit. E-cigarettes or smokeless tobacco still contain nicotine. Talk to your healthcare provider before you use these products. Limit alcohol. A drink of alcohol is 12 ounces of beer, 5 ounces of wine, or 1½ ounces of liquor. Lose weight, if needed. Being overweight increases your risk of certain health conditions. These include heart disease, high blood pressure, type 2 diabetes, and certain types of cancer. Protect your skin. Do not sunbathe or use tanning beds. Use sunscreen with a SPF 15 or higher. Apply sunscreen at least 15 minutes before you go outside. Reapply sunscreen every 2 hours. Wear protective clothing, hats, and sunglasses when you are outside. Drive safely. Always wear your seatbelt. Make sure everyone in your car wears a seatbelt. A seatbelt can save your life if you are in an accident. Do not use your cell phone when you are driving. This could distract you and cause an accident. Pull over if you need to make a call or send a text message. Practice safe sex. Use latex condoms if are sexually active and have more than one partner. Your healthcare provider may recommend screening tests for sexually transmitted infections (STIs). Wear helmets, lifejackets, and protective gear. Always wear a helmet when you ride a bike or motorcycle, go skiing, or play sports that could cause a head injury. Wear protective equipment when you play sports. Wear a lifejacket when you are on a boat or doing water sports.     © Copyright Radha Mar 2023 Information is for End User's use only and may not be sold, redistributed or otherwise used for commercial purposes. The above information is an  only. It is not intended as medical advice for individual conditions or treatments. Talk to your doctor, nurse or pharmacist before following any medical regimen to see if it is safe and effective for you.

## 2023-11-22 NOTE — PROGRESS NOTES
ADULT ANNUAL [address] INTERNAL MEDICINE    NAME: Travis Nelson  AGE: 48 y.o. SEX: male  : 1970     DATE: 2023     Assessment and Plan:     Problem List Items Addressed This Visit          Musculoskeletal and Integument    Acute idiopathic gout of left foot - Primary    Relevant Medications    diclofenac (VOLTAREN) 75 mg EC tablet    predniSONE 50 mg tablet     Other Visit Diagnoses       Screening for colon cancer        Relevant Orders    Ambulatory Referral to Gastroenterology    Encounter for immunization        Relevant Orders    influenza vaccine, quadrivalent, 0.5 mL, preservative-free, for adult and pediatric patients 6 mos+ (AFLURIA, FLUARIX, FLULAVAL, FLUZONE) (Completed)    Breast disorder        Relevant Orders    Mammo diagnostic left w cad    US breast left limited (diagnostic)    Annual physical exam                Immunizations and preventive care screenings were discussed with patient today. Appropriate education was printed on patient's after visit summary. Discussed risks and benefits of prostate cancer screening. We discussed the controversial history of PSA screening for prostate cancer in the [facility] as well as the risk of over detection and over treatment of prostate cancer by way of PSA screening. The patient understands that PSA blood testing is an imperfect way to screen for prostate cancer and that elevated PSA levels in the blood may also be caused by infection, inflammation, prostatic trauma or manipulation, urological procedures, or by benign prostatic enlargement. The role of the digital rectal examination in prostate cancer screening was also discussed and I discussed with him that there is large interobserver variability in the findings of digital rectal examination. Counseling:  Exercise: the importance of regular exercise/physical activity was discussed.  Recommend exercise 3-5 times per week for at least 30 minutes. BMI Counseling: Body mass index is 40.62 kg/m². The BMI is above normal. Nutrition recommendations include decreasing portion sizes, encouraging healthy choices of fruits and vegetables and decreasing fast food intake. Exercise recommendations include moderate physical activity 150 minutes/week. Rationale for BMI follow-up plan is due to patient being overweight or obese. No follow-ups on file. Chief Complaint:     Chief Complaint   Patient presents with    Lump in breast area.    gout flare up    Flu Vaccine      History of Present Illness:     Adult Annual Physical   Patient here for a comprehensive physical exam. The patient reports problems - left foot pain, breast lump . Diet and Physical Activity  Diet/Nutrition: well balanced diet. Exercise: moderate cardiovascular exercise. Depression Screening  PHQ-2/9 Depression Screening           General Health  Sleep: sleeps poorly. Hearing: normal - bilateral.  Vision: no vision problems. Dental: regular dental visits.  Health  Symptoms include: none    Advanced Care Planning  Do you have an advanced directive? no  Do you have a durable medical power of ? yes     Review of Systems:     Review of Systems   Constitutional:  Negative for chills and fever. HENT:  Negative for congestion, ear pain and sore throat. Eyes:  Negative for pain. Respiratory:  Negative for cough and shortness of breath. Cardiovascular:  Negative for chest pain and leg swelling. Gastrointestinal:  Negative for abdominal pain, nausea and vomiting. Endocrine: Negative for polyuria. Genitourinary:  Negative for difficulty urinating, frequency and urgency. Musculoskeletal:  Positive for arthralgias and back pain. Skin:  Negative for rash. Neurological:  Negative for weakness and headaches. Psychiatric/Behavioral:  Negative for sleep disturbance. The patient is not nervous/anxious.        Past Medical History: Past Medical History:   Diagnosis Date    Asthma     Asthmatic bronchitis     Body mass index 34.0-34.9, adult     Convulsions ([address])     Diverticulitis     Diverticulosis     sigmoid colon - on colonoscopy 1/31/2011     Gastric ulcer     Gout     Left shoulder pain     Lumbar disc herniation with radiculopathy     Osteoarthritis     Pharyngitis     Pulmonary embolism ([address]) 08/15/2012    Right knee pain     Rotator cuff syndrome of left shoulder     Routine general medical examination at a health care facility     SOB (shortness of breath)     Spontaneous rupture of tendon of left shoulder     Syncope and collapse     Vestibular neuritis       Past Surgical History:     Past Surgical History:   Procedure Laterality Date    COLON SURGERY  08/08/2012    Sigmoid colon removed for diverticulosis     EGD AND COLONOSCOPY  01/31/2011    Dr. Luis Bunch        Family History:     Family History   Problem Relation Age of Onset    Hypertension Mother     Hyperlipidemia Mother       Social History:     Social History     Socioeconomic History    Marital status: /Civil Union     Spouse name: None    Number of children: None    Years of education: None    Highest education level: None   Occupational History    None   Tobacco Use    Smoking status: Never    Smokeless tobacco: Never   Vaping Use    Vaping Use: Never used   Substance and Sexual Activity    Alcohol use: Not Currently    Drug use: Never    Sexual activity: None   Other Topics Concern    None   Social History Narrative    None     Social Determinants of Health     Financial Resource Strain: Not on file   Food Insecurity: Not on file   Transportation Needs: Not on file   Physical Activity: Not on file   Stress: Not on file   Social Connections: Not on file   Intimate Partner Violence: Not on file   Housing Stability: Not on file      Current Medications:     Current Outpatient Medications   Medication Sig Dispense Refill    allopurinol (ZYLOPRIM) 100 mg tablet Take 2 tablets (200 mg total) by mouth daily 60 tablet 5    diclofenac (VOLTAREN) 75 mg EC tablet Take 1 tablet (75 mg total) by mouth 2 (two) times a day 30 tablet 0    predniSONE 50 mg tablet Take 1 tablet (50 mg total) by mouth daily for 7 days 7 tablet 0    albuterol (PROVENTIL HFA,VENTOLIN HFA) 90 mcg/act inhaler Inhale 2 puffs every 6 (six) hours as needed for wheezing (Patient not taking: Reported on 11/22/2023) 8 g 1    colchicine (COLCRYS) 0.6 mg tablet Take 1 tablet (0.6 mg total) by mouth 2 (two) times a day (Patient not taking: Reported on 2/24/2023) 20 tablet 0    methocarbamol (Robaxin-750) 750 mg tablet Take 1 tablet (750 mg total) by mouth every 6 (six) hours as needed for muscle spasms (Patient not taking: Reported on 11/22/2023) 40 tablet 0     No current facility-administered medications for this visit. Allergies: Allergies   Allergen Reactions    Cefazolin Hives    Metronidazole Hives    Levofloxacin Palpitations      Physical Exam:     /86 (BP Location: Left arm, Patient Position: Sitting, Cuff Size: Standard)   Pulse 92   Temp 98.2 °F (36.8 °C) (Temporal)   Ht 6' 3" (1.905 m)   Wt (!) 147 kg (325 lb)   SpO2 98%   BMI 40.62 kg/m²     Physical Exam  Vitals and nursing note reviewed. Constitutional:       General: He is not in acute distress. Appearance: He is well-developed. HENT:      Head: Normocephalic and atraumatic. Eyes:      Conjunctiva/sclera: Conjunctivae normal.   Cardiovascular:      Rate and Rhythm: Normal rate and regular rhythm. Heart sounds: No murmur heard. Pulmonary:      Effort: Pulmonary effort is normal. No respiratory distress. Breath sounds: Normal breath sounds. Abdominal:      Palpations: Abdomen is soft. Tenderness: There is no abdominal tenderness. Musculoskeletal:         General: No swelling. Cervical back: Neck supple. Skin:     General: Skin is warm and dry.       Capillary Refill: Capillary refill takes less than 2 seconds. Neurological:      Mental Status: He is alert.    Psychiatric:         Mood and Affect: Mood normal.          Catalina Up MD  [facility] INTERNAL MEDICINE

## 2023-12-08 ENCOUNTER — OFFICE VISIT (OUTPATIENT)
Dept: INTERNAL MEDICINE CLINIC | Facility: CLINIC | Age: 53
End: 2023-12-08
Payer: COMMERCIAL

## 2023-12-08 VITALS
WEIGHT: 315 LBS | HEIGHT: 75 IN | SYSTOLIC BLOOD PRESSURE: 128 MMHG | HEART RATE: 92 BPM | OXYGEN SATURATION: 97 % | TEMPERATURE: 98.5 F | BODY MASS INDEX: 39.17 KG/M2 | DIASTOLIC BLOOD PRESSURE: 84 MMHG

## 2023-12-08 DIAGNOSIS — J45.20 MILD INTERMITTENT ASTHMA WITHOUT COMPLICATION: ICD-10-CM

## 2023-12-08 DIAGNOSIS — Z12.11 SCREENING FOR COLON CANCER: ICD-10-CM

## 2023-12-08 DIAGNOSIS — E78.2 MIXED HYPERLIPIDEMIA: ICD-10-CM

## 2023-12-08 DIAGNOSIS — M10.00 IDIOPATHIC GOUT, UNSPECIFIED CHRONICITY, UNSPECIFIED SITE: Primary | ICD-10-CM

## 2023-12-08 DIAGNOSIS — K21.9 GASTROESOPHAGEAL REFLUX DISEASE WITHOUT ESOPHAGITIS: ICD-10-CM

## 2023-12-08 DIAGNOSIS — B49 FUNGAL INFECTION: ICD-10-CM

## 2023-12-08 DIAGNOSIS — R73.01 IMPAIRED FASTING BLOOD SUGAR: ICD-10-CM

## 2023-12-08 PROCEDURE — 99214 OFFICE O/P EST MOD 30 MIN: CPT | Performed by: INTERNAL MEDICINE

## 2023-12-08 RX ORDER — FLUCONAZOLE 150 MG/1
150 TABLET ORAL DAILY
Qty: 5 TABLET | Refills: 0 | Status: SHIPPED | OUTPATIENT
Start: 2023-12-08 | End: 2023-12-13

## 2023-12-08 RX ORDER — OMEPRAZOLE 20 MG/1
20 CAPSULE, DELAYED RELEASE ORAL 2 TIMES DAILY
Qty: 60 CAPSULE | Refills: 1 | Status: SHIPPED | OUTPATIENT
Start: 2023-12-08

## 2023-12-08 NOTE — PROGRESS NOTES
Assessment/Plan:      Depression Screening and Follow-up Plan: Patient was screened for depression during today's encounter. They screened negative with a PHQ-2 score of 0.            1. Idiopathic gout, unspecified chronicity, unspecified site  -     Uric acid; Future    2. Impaired fasting blood sugar  -     CBC and differential; Future  -     Hemoglobin A1C; Future    3. Gastroesophageal reflux disease without esophagitis  -     omeprazole (PriLOSEC) 20 mg delayed release capsule; Take 1 capsule (20 mg total) by mouth 2 (two) times a day    4. Mild intermittent asthma without complication    5. Screening for colon cancer  -     Ambulatory Referral to Gastroenterology; Future    6. Mixed hyperlipidemia  -     Comprehensive metabolic panel; Future  -     Lipid Panel with Direct LDL reflex; Future  -     TSH, 3rd generation; Future    7. Fungal infection  -     fluconazole (DIFLUCAN) 150 mg tablet; Take 1 tablet (150 mg total) by mouth daily for 5 doses           Subjective:      Patient ID: Jerry London is a 48 y.o. male. gout flaring up, scheduled for mammogram on Wednesday,        The following portions of the patient's history were reviewed and updated as appropriate: He  has a past medical history of Asthma, Asthmatic bronchitis, Body mass index 34.0-34.9, adult, Convulsions ([address]), Diverticulitis, Diverticulosis, Gastric ulcer, Gout, Left shoulder pain, Lumbar disc herniation with radiculopathy, Osteoarthritis, Pharyngitis, Pulmonary embolism ([address]) (08/15/2012), Right knee pain, Rotator cuff syndrome of left shoulder, Routine general medical examination at a health care facility, SOB (shortness of breath), Spontaneous rupture of tendon of left shoulder, Syncope and collapse, and Vestibular neuritis.   He   Patient Active Problem List    Diagnosis Date Noted    Seasonal allergic rhinitis due to pollen 06/02/2023    Acute idiopathic gout of left foot 01/24/2023    Fungal infection 01/13/2023    Acute idiopathic gout of left knee 12/20/2022    Acute deep vein thrombosis (DVT) of left peroneal vein ([address]) 12/20/2022    Tracheitis 05/24/2022    Mild intermittent asthma without complication 21/38/2517    Impaired fasting blood sugar 01/25/2022    Mixed hyperlipidemia 01/25/2022    Sacroiliitis (HCC)     Chronic pain syndrome 07/08/2021    Lumbar spondylosis 06/03/2021    Lumbar disc herniation with radiculopathy     Gastroesophageal reflux disease without esophagitis 01/12/2021    Diverticulosis large intestine w/o perforation or abscess w/o bleeding 10/05/2020    Body mass index 37.0-37.9, adult 10/05/2020    Gout     Asthma     Diverticulitis      He  has a past surgical history that includes Sinus surgery; EGD AND COLONOSCOPY (01/31/2011); and Colon surgery (08/08/2012). His family history includes Hyperlipidemia in his mother; Hypertension in his mother. He  reports that he has never smoked. He has never used smokeless tobacco. He reports that he does not currently use alcohol. He reports that he does not use drugs.   Current Outpatient Medications   Medication Sig Dispense Refill    allopurinol (ZYLOPRIM) 100 mg tablet Take 2 tablets (200 mg total) by mouth daily 60 tablet 5    diclofenac (VOLTAREN) 75 mg EC tablet Take 1 tablet (75 mg total) by mouth 2 (two) times a day 30 tablet 0    fluconazole (DIFLUCAN) 150 mg tablet Take 1 tablet (150 mg total) by mouth daily for 5 doses 5 tablet 0    omeprazole (PriLOSEC) 20 mg delayed release capsule Take 1 capsule (20 mg total) by mouth 2 (two) times a day 60 capsule 1    albuterol (PROVENTIL HFA,VENTOLIN HFA) 90 mcg/act inhaler Inhale 2 puffs every 6 (six) hours as needed for wheezing 8 g 1    colchicine (COLCRYS) 0.6 mg tablet Take 1 tablet (0.6 mg total) by mouth 2 (two) times a day 20 tablet 0    methocarbamol (Robaxin-750) 750 mg tablet Take 1 tablet (750 mg total) by mouth every 6 (six) hours as needed for muscle spasms 40 tablet 0     No current facility-administered medications for this visit. Current Outpatient Medications on File Prior to Visit   Medication Sig    allopurinol (ZYLOPRIM) 100 mg tablet Take 2 tablets (200 mg total) by mouth daily    diclofenac (VOLTAREN) 75 mg EC tablet Take 1 tablet (75 mg total) by mouth 2 (two) times a day    albuterol (PROVENTIL HFA,VENTOLIN HFA) 90 mcg/act inhaler Inhale 2 puffs every 6 (six) hours as needed for wheezing    colchicine (COLCRYS) 0.6 mg tablet Take 1 tablet (0.6 mg total) by mouth 2 (two) times a day    methocarbamol (Robaxin-750) 750 mg tablet Take 1 tablet (750 mg total) by mouth every 6 (six) hours as needed for muscle spasms     No current facility-administered medications on file prior to visit. He is allergic to cefazolin, metronidazole, and levofloxacin. .    Review of Systems   Constitutional:  Negative for chills and fever. HENT:  Negative for congestion, ear pain and sore throat. Eyes:  Negative for pain. Respiratory:  Negative for cough and shortness of breath. Cardiovascular:  Negative for chest pain and leg swelling. Gastrointestinal:  Negative for abdominal pain, nausea and vomiting. Endocrine: Negative for polyuria. Genitourinary:  Negative for difficulty urinating, frequency and urgency. Musculoskeletal:  Negative for arthralgias and back pain. Skin:  Negative for rash. Neurological:  Negative for weakness and headaches. Psychiatric/Behavioral:  Negative for sleep disturbance. The patient is not nervous/anxious. Objective:      /84 (BP Location: Left arm, Patient Position: Sitting, Cuff Size: Standard)   Pulse 92   Temp 98.5 °F (36.9 °C) (Temporal)   Ht 6' 3" (1.905 m)   Wt (!) 145 kg (320 lb)   SpO2 97%   BMI 40.00 kg/m²     No results found for this or any previous visit (from the past 1344 hour(s)). Physical Exam  Constitutional:       Appearance: Normal appearance. HENT:      Head: Normocephalic.       Right Ear: External ear normal.      Left Ear: External ear normal.      Nose: Nose normal. No congestion. Mouth/Throat:      Mouth: Mucous membranes are moist.      Pharynx: Oropharynx is clear. No oropharyngeal exudate or posterior oropharyngeal erythema. Eyes:      Extraocular Movements: Extraocular movements intact. Conjunctiva/sclera: Conjunctivae normal.   Cardiovascular:      Rate and Rhythm: Normal rate and regular rhythm. Heart sounds: Normal heart sounds. No murmur heard. Pulmonary:      Effort: Pulmonary effort is normal.      Breath sounds: Normal breath sounds. No wheezing or rales. Abdominal:      General: Abdomen is flat. There is no distension. Palpations: Abdomen is soft. Tenderness: There is no abdominal tenderness. Musculoskeletal:         General: Normal range of motion. Cervical back: Normal range of motion and neck supple. Right lower leg: No edema. Left lower leg: No edema. Lymphadenopathy:      Cervical: No cervical adenopathy. Skin:     General: Skin is warm. Neurological:      General: No focal deficit present. Mental Status: He is alert and oriented to person, place, and time.

## 2023-12-21 ENCOUNTER — APPOINTMENT (EMERGENCY)
Dept: RADIOLOGY | Facility: HOSPITAL | Age: 53
End: 2023-12-21
Payer: COMMERCIAL

## 2023-12-21 ENCOUNTER — HOSPITAL ENCOUNTER (EMERGENCY)
Facility: HOSPITAL | Age: 53
Discharge: HOME | End: 2023-12-21
Attending: EMERGENCY MEDICINE
Payer: COMMERCIAL

## 2023-12-21 VITALS
WEIGHT: 315 LBS | OXYGEN SATURATION: 95 % | HEIGHT: 75 IN | BODY MASS INDEX: 39.17 KG/M2 | TEMPERATURE: 98.9 F | DIASTOLIC BLOOD PRESSURE: 83 MMHG | SYSTOLIC BLOOD PRESSURE: 121 MMHG | RESPIRATION RATE: 18 BRPM | HEART RATE: 102 BPM

## 2023-12-21 DIAGNOSIS — M10.9 GOUT ATTACK: Primary | ICD-10-CM

## 2023-12-21 LAB
ALBUMIN SERPL BCP-MCNC: 4.6 G/DL (ref 3.5–5)
ALP SERPL-CCNC: 79 U/L (ref 34–104)
ALT SERPL W P-5'-P-CCNC: 35 U/L (ref 7–52)
ANION GAP SERPL CALCULATED.3IONS-SCNC: 11 MMOL/L
AST SERPL W P-5'-P-CCNC: 31 U/L (ref 13–39)
BASOPHILS # BLD AUTO: 0.04 THOUSANDS/ÂΜL (ref 0–0.1)
BASOPHILS NFR BLD AUTO: 0 % (ref 0–1)
BILIRUB DIRECT SERPL-MCNC: 0.06 MG/DL (ref 0–0.2)
BILIRUB SERPL-MCNC: 0.6 MG/DL (ref 0.2–1)
BUN SERPL-MCNC: 15 MG/DL (ref 5–25)
CALCIUM SERPL-MCNC: 9.6 MG/DL (ref 8.4–10.2)
CARDIAC TROPONIN I PNL SERPL HS: <2 NG/L
CARDIAC TROPONIN I PNL SERPL HS: <2 NG/L
CHLORIDE SERPL-SCNC: 102 MMOL/L (ref 96–108)
CO2 SERPL-SCNC: 25 MMOL/L (ref 21–32)
CREAT SERPL-MCNC: 0.96 MG/DL (ref 0.6–1.3)
EOSINOPHIL # BLD AUTO: 0.3 THOUSAND/ÂΜL (ref 0–0.61)
EOSINOPHIL NFR BLD AUTO: 2 % (ref 0–6)
ERYTHROCYTE [DISTWIDTH] IN BLOOD BY AUTOMATED COUNT: 13.2 % (ref 11.6–15.1)
GFR SERPL CREATININE-BSD FRML MDRD: 89 ML/MIN/1.73SQ M
GLUCOSE SERPL-MCNC: 121 MG/DL (ref 65–140)
HCT VFR BLD AUTO: 49.2 % (ref 36.5–49.3)
HGB BLD-MCNC: 16.2 G/DL (ref 12–17)
IMM GRANULOCYTES # BLD AUTO: 0.1 THOUSAND/UL (ref 0–0.2)
IMM GRANULOCYTES NFR BLD AUTO: 1 % (ref 0–2)
LIPASE SERPL-CCNC: 10 U/L (ref 11–82)
LYMPHOCYTES # BLD AUTO: 2.26 THOUSANDS/ÂΜL (ref 0.6–4.47)
LYMPHOCYTES NFR BLD AUTO: 18 % (ref 14–44)
MCH RBC QN AUTO: 29.2 PG (ref 26.8–34.3)
MCHC RBC AUTO-ENTMCNC: 32.9 G/DL (ref 31.4–37.4)
MCV RBC AUTO: 89 FL (ref 82–98)
MONOCYTES # BLD AUTO: 1.08 THOUSAND/ÂΜL (ref 0.17–1.22)
MONOCYTES NFR BLD AUTO: 9 % (ref 4–12)
NEUTROPHILS # BLD AUTO: 8.93 THOUSANDS/ÂΜL (ref 1.85–7.62)
NEUTS SEG NFR BLD AUTO: 70 % (ref 43–75)
NRBC BLD AUTO-RTO: 0 /100 WBCS
PLATELET # BLD AUTO: 303 THOUSANDS/UL (ref 149–390)
PMV BLD AUTO: 8.9 FL (ref 8.9–12.7)
POTASSIUM SERPL-SCNC: 4.8 MMOL/L (ref 3.5–5.3)
PROT SERPL-MCNC: 8.2 G/DL (ref 6.4–8.4)
RBC # BLD AUTO: 5.55 MILLION/UL (ref 3.88–5.62)
SODIUM SERPL-SCNC: 138 MMOL/L (ref 135–147)
WBC # BLD AUTO: 12.71 THOUSAND/UL (ref 4.31–10.16)

## 2023-12-21 PROCEDURE — 80048 BASIC METABOLIC PNL TOTAL CA: CPT

## 2023-12-21 PROCEDURE — 36415 COLL VENOUS BLD VENIPUNCTURE: CPT

## 2023-12-21 PROCEDURE — 93005 ELECTROCARDIOGRAM TRACING: CPT

## 2023-12-21 PROCEDURE — 83690 ASSAY OF LIPASE: CPT

## 2023-12-21 PROCEDURE — 99285 EMERGENCY DEPT VISIT HI MDM: CPT | Performed by: EMERGENCY MEDICINE

## 2023-12-21 PROCEDURE — 71045 X-RAY EXAM CHEST 1 VIEW: CPT

## 2023-12-21 PROCEDURE — 80076 HEPATIC FUNCTION PANEL: CPT

## 2023-12-21 PROCEDURE — 96375 TX/PRO/DX INJ NEW DRUG ADDON: CPT

## 2023-12-21 PROCEDURE — 85025 COMPLETE CBC W/AUTO DIFF WBC: CPT

## 2023-12-21 PROCEDURE — 99285 EMERGENCY DEPT VISIT HI MDM: CPT

## 2023-12-21 PROCEDURE — 84484 ASSAY OF TROPONIN QUANT: CPT

## 2023-12-21 PROCEDURE — 96374 THER/PROPH/DIAG INJ IV PUSH: CPT

## 2023-12-21 RX ORDER — KETOROLAC TROMETHAMINE 30 MG/ML
15 INJECTION, SOLUTION INTRAMUSCULAR; INTRAVENOUS ONCE
Status: COMPLETED | OUTPATIENT
Start: 2023-12-21 | End: 2023-12-21

## 2023-12-21 RX ORDER — MORPHINE SULFATE 4 MG/ML
4 INJECTION, SOLUTION INTRAMUSCULAR; INTRAVENOUS ONCE
Status: COMPLETED | OUTPATIENT
Start: 2023-12-21 | End: 2023-12-21

## 2023-12-21 RX ADMIN — KETOROLAC TROMETHAMINE 15 MG: 30 INJECTION, SOLUTION INTRAMUSCULAR; INTRAVENOUS at 10:07

## 2023-12-21 RX ADMIN — MORPHINE SULFATE 4 MG: 4 INJECTION INTRAVENOUS at 08:24

## 2023-12-21 NOTE — ED ATTENDING ATTESTATION
12/21/2023  IMouna MD, saw and evaluated the patient. I have discussed the patient with the resident/non-physician practitioner and agree with the resident's/non-physician practitioner's findings, Plan of Care, and MDM as documented in the resident's/non-physician practitioner's note, except where noted. All available labs and Radiology studies were reviewed.  I was present for key portions of any procedure(s) performed by the resident/non-physician practitioner and I was immediately available to provide assistance.       At this point I agree with the current assessment done in the Emergency Department.  I have conducted an independent evaluation of this patient a history and physical is as follows:    53-year-old presenting to the ER due to gout flare of the left ankle.  History of the gout.  Feels very similar.  No trauma.  No fevers.  No erythema.  No Wounds.    States took allopurinol and it felt stuck and gave him reflux in his chest, improved after burping.  No other associated symptoms.  Abdomen soft nontender.  No nausea vomiting.  No abdominal pain.    No calf pain or swelling.  No calf tenderness.    53-year-old with gout, will treat for pain control, NSAIDs.  Will do cardiac evaluation including EKG and troponin as well as liver enzymes and lipase.    ECG shows rate of 114, sinus, normal axis, normal QRS, no significant ST or T wave changes, normal intervals, independently interpreted by me      ED Course  ED Course as of 12/21/23 1625   Thu Dec 21, 2023   1129 Called the lab, having instrument issues, waiting for lipase and hepatic function panel         Critical Care Time  Procedures

## 2023-12-21 NOTE — ED PROVIDER NOTES
History  Chief Complaint   Patient presents with    Gout Pain     Left ankle pain, hx gout says he's having a flare up painful, swollen and unable to walk on it      Chest Pain     States when he has gout flare ups he gets gassy in his chest and always needs antacids      Patient presenting with an acute gout flare up in his left ankle. He takes allopurinol 300 mg and diclofenac 75 mg BID. He is here for 10/10 pain when he stands on that leg. He had chest pain while in the waiting room that resolved after burping. No n/v, no fevers.       Chest Pain  Associated symptoms: no palpitations        Prior to Admission Medications   Prescriptions Last Dose Informant Patient Reported? Taking?   albuterol (PROVENTIL HFA,VENTOLIN HFA) 90 mcg/act inhaler  Self No No   Sig: Inhale 2 puffs every 6 (six) hours as needed for wheezing   allopurinol (ZYLOPRIM) 100 mg tablet  Self No No   Sig: Take 2 tablets (200 mg total) by mouth daily   colchicine (COLCRYS) 0.6 mg tablet  Self No No   Sig: Take 1 tablet (0.6 mg total) by mouth 2 (two) times a day   diclofenac (VOLTAREN) 75 mg EC tablet   No No   Sig: Take 1 tablet (75 mg total) by mouth 2 (two) times a day   methocarbamol (Robaxin-750) 750 mg tablet   No No   Sig: Take 1 tablet (750 mg total) by mouth every 6 (six) hours as needed for muscle spasms   omeprazole (PriLOSEC) 20 mg delayed release capsule   No No   Sig: Take 1 capsule (20 mg total) by mouth 2 (two) times a day      Facility-Administered Medications: None       Past Medical History:   Diagnosis Date    Asthma     Asthmatic bronchitis     Body mass index 34.0-34.9, adult     Convulsions (HCC)     Diverticulitis     Diverticulosis     sigmoid colon - on colonoscopy 1/31/2011     Gastric ulcer     Gout     Left shoulder pain     Lumbar disc herniation with radiculopathy     Osteoarthritis     Pharyngitis     Pulmonary embolism (HCC) 08/15/2012    Right knee pain     Rotator cuff syndrome of left shoulder     Routine  general medical examination at a health care facility     SOB (shortness of breath)     Spontaneous rupture of tendon of left shoulder     Syncope and collapse     Vestibular neuritis        Past Surgical History:   Procedure Laterality Date    COLON SURGERY  08/08/2012    Sigmoid colon removed for diverticulosis     EGD AND COLONOSCOPY  01/31/2011    Dr. So    SINUS SURGERY         Family History   Problem Relation Age of Onset    Hypertension Mother     Hyperlipidemia Mother      I have reviewed and agree with the history as documented.    E-Cigarette/Vaping    E-Cigarette Use Never User      E-Cigarette/Vaping Substances    Nicotine No     THC No     CBD No     Flavoring No     Other No     Unknown No      Social History     Tobacco Use    Smoking status: Never    Smokeless tobacco: Never   Vaping Use    Vaping status: Never Used   Substance Use Topics    Alcohol use: Not Currently    Drug use: Never        Review of Systems   Constitutional: Negative.    HENT: Negative.     Eyes: Negative.    Respiratory: Negative.     Cardiovascular:  Positive for chest pain and leg swelling. Negative for palpitations.   Gastrointestinal: Negative.    Endocrine: Negative.    Genitourinary: Negative.    Skin: Negative.    Neurological: Negative.    Hematological: Negative.    Psychiatric/Behavioral: Negative.         Physical Exam  ED Triage Vitals   Temperature Pulse Respirations Blood Pressure SpO2   12/21/23 0655 12/21/23 0655 12/21/23 0655 12/21/23 0655 12/21/23 0655   98.7 °F (37.1 °C) (!) 127 19 138/97 94 %      Temp Source Heart Rate Source Patient Position - Orthostatic VS BP Location FiO2 (%)   12/21/23 0655 12/21/23 0655 12/21/23 0655 12/21/23 0655 --   Oral Monitor Sitting Right arm       Pain Score       12/21/23 0824       9             Orthostatic Vital Signs  Vitals:    12/21/23 0655 12/21/23 0924 12/21/23 1109   BP: 138/97 113/79 121/83   Pulse: (!) 127 (!) 108 102   Patient Position - Orthostatic VS:  Sitting Lying Lying       Physical Exam  Constitutional:       Appearance: He is well-developed and normal weight.   HENT:      Head: Normocephalic.   Cardiovascular:      Rate and Rhythm: Normal rate and regular rhythm.      Heart sounds: Normal heart sounds.   Pulmonary:      Effort: Pulmonary effort is normal.      Breath sounds: Normal breath sounds.   Abdominal:      General: Bowel sounds are normal.      Palpations: Abdomen is soft.   Musculoskeletal:      Right lower leg: Normal. No swelling. No edema.      Left lower leg: Normal. No swelling. No edema.      Right ankle: Normal.      Left ankle: Swelling present. Tenderness present over the lateral malleolus. Decreased range of motion.   Skin:     General: Skin is warm and dry.   Neurological:      General: No focal deficit present.      Mental Status: He is alert.   Psychiatric:         Mood and Affect: Mood normal.         Behavior: Behavior normal.         ED Medications  Medications   morphine injection 4 mg (4 mg Intravenous Given 12/21/23 0824)   ketorolac (TORADOL) injection 15 mg (15 mg Intravenous Given 12/21/23 1007)       Diagnostic Studies  Results Reviewed       Procedure Component Value Units Date/Time    Lipase [[id number]]  (Abnormal) Collected: 12/21/23 0812    Lab Status: Final result Specimen: Blood from Arm, Left Updated: 12/21/23 1146     Lipase 10 u/L     Hepatic function panel [[id number]]  (Normal) Collected: 12/21/23 0812    Lab Status: Final result Specimen: Blood from Arm, Left Updated: 12/21/23 1146     Total Bilirubin 0.60 mg/dL      Bilirubin, Direct 0.06 mg/dL      Alkaline Phosphatase 79 U/L      AST 31 U/L      ALT 35 U/L      Total Protein 8.2 g/dL      Albumin 4.6 g/dL     HS Troponin I 2hr [[id number]] Collected: 12/21/23 1007    Lab Status: Final result Specimen: Blood from Arm, Left Updated: 12/21/23 1034     hs TnI 2hr <2 ng/L      Delta 2hr hsTnI --    Basic metabolic panel [[id number]] Collected: 12/21/23 0812    Lab  Status: Final result Specimen: Blood from Arm, Left Updated: 12/21/23 0907     Sodium 138 mmol/L      Potassium 4.8 mmol/L      Chloride 102 mmol/L      CO2 25 mmol/L      ANION GAP 11 mmol/L      BUN 15 mg/dL      Creatinine 0.96 mg/dL      Glucose 121 mg/dL      Calcium 9.6 mg/dL      eGFR 89 ml/min/1.73sq m     Narrative:      National Kidney Disease Foundation guidelines for Chronic Kidney Disease (CKD):     Stage 1 with normal or high GFR (GFR > 90 mL/min/1.73 square meters)    Stage 2 Mild CKD (GFR = 60-89 mL/min/1.73 square meters)    Stage 3A Moderate CKD (GFR = 45-59 mL/min/1.73 square meters)    Stage 3B Moderate CKD (GFR = 30-44 mL/min/1.73 square meters)    Stage 4 Severe CKD (GFR = 15-29 mL/min/1.73 square meters)    Stage 5 End Stage CKD (GFR <15 mL/min/1.73 square meters)  Note: GFR calculation is accurate only with a steady state creatinine    HS Troponin 0hr (reflex protocol) [[id number]]  (Normal) Collected: 12/21/23 0812    Lab Status: Final result Specimen: Blood from Arm, Left Updated: 12/21/23 0903     hs TnI 0hr <2 ng/L     CBC and differential [[id number]]  (Abnormal) Collected: 12/21/23 0812    Lab Status: Final result Specimen: Blood from Arm, Left Updated: 12/21/23 0831     WBC 12.71 Thousand/uL      RBC 5.55 Million/uL      Hemoglobin 16.2 g/dL      Hematocrit 49.2 %      MCV 89 fL      MCH 29.2 pg      MCHC 32.9 g/dL      RDW 13.2 %      MPV 8.9 fL      Platelets 303 Thousands/uL      nRBC 0 /100 WBCs      Neutrophils Relative 70 %      Immat GRANS % 1 %      Lymphocytes Relative 18 %      Monocytes Relative 9 %      Eosinophils Relative 2 %      Basophils Relative 0 %      Neutrophils Absolute 8.93 Thousands/µL      Immature Grans Absolute 0.10 Thousand/uL      Lymphocytes Absolute 2.26 Thousands/µL      Monocytes Absolute 1.08 Thousand/µL      Eosinophils Absolute 0.30 Thousand/µL      Basophils Absolute 0.04 Thousands/µL                    XR chest 1 view portable   Final Result by  Negrita Ibarra MD (12/21 1042)      No acute cardiopulmonary disease.                  Resident: JORDANA HARO I, the attending radiologist, have reviewed the images and agree with the final report above.      Workstation performed: [id number]               Procedures  Procedures      ED Course     Patient seen and evaluated. Troponin baseline and 2 hr negative. Normal EKG. CMP and lipase wnl. Pain controlled with 4 mg morphine and 15 mg toradol.                                   Medical Decision Making  Amount and/or Complexity of Data Reviewed  Labs: ordered.  Radiology: ordered.    Risk  Prescription drug management.          Disposition  Final diagnoses:   Gout attack     Time reflects when diagnosis was documented in both MDM as applicable and the Disposition within this note       Time User Action Codes Description Comment    12/21/2023 12:02 PM Sujatha Jesus Add [M10.9] Gout attack           ED Disposition       ED Disposition   Discharge    Condition   Stable    Date/Time   Thu Dec 21, 2023 12:02 PM    Comment   Tiburcio Rashid discharge to home/self care.                   Follow-up Information       Follow up With Specialties Details Why Contact Info    Catalina Up MD Internal Medicine Schedule an appointment as soon as possible for a visit in 1 week  [address] [place] [address]  [phone]              Patient's Medications   Discharge Prescriptions    No medications on file     No discharge procedures on file.    PDMP Review         Value Time User    PDMP Reviewed  Yes 12/20/2022 11:01 AM Catalina Up MD             ED Provider  Attending physically available and evaluated Tiburcio Sharmamacarenacarol. I managed the patient along with the ED Attending.    Electronically Signed by Sujatha Jesus MD  12/21/23 7221

## 2023-12-24 LAB
ATRIAL RATE: 114 BPM
P AXIS: 74 DEGREES
PR INTERVAL: 128 MS
QRS AXIS: 59 DEGREES
QRSD INTERVAL: 92 MS
QT INTERVAL: 318 MS
QTC INTERVAL: 438 MS
T WAVE AXIS: 49 DEGREES
VENTRICULAR RATE: 114 BPM

## 2023-12-27 DIAGNOSIS — M10.00 IDIOPATHIC GOUT, UNSPECIFIED CHRONICITY, UNSPECIFIED SITE: ICD-10-CM

## 2023-12-27 RX ORDER — COLCHICINE 0.6 MG/1
0.6 TABLET ORAL 2 TIMES DAILY
Qty: 20 TABLET | Refills: 1 | Status: SHIPPED | OUTPATIENT
Start: 2023-12-27 | End: 2024-01-03

## 2024-01-03 ENCOUNTER — TELEMEDICINE (OUTPATIENT)
Dept: INTERNAL MEDICINE CLINIC | Facility: CLINIC | Age: 54
End: 2024-01-03
Payer: COMMERCIAL

## 2024-01-03 VITALS — BODY MASS INDEX: 39.17 KG/M2 | WEIGHT: 315 LBS | HEIGHT: 75 IN

## 2024-01-03 DIAGNOSIS — U07.1 COVID-19: Primary | ICD-10-CM

## 2024-01-03 DIAGNOSIS — J30.1 SEASONAL ALLERGIC RHINITIS DUE TO POLLEN: ICD-10-CM

## 2024-01-03 DIAGNOSIS — J45.20 MILD INTERMITTENT ASTHMA WITHOUT COMPLICATION: ICD-10-CM

## 2024-01-03 PROCEDURE — 99213 OFFICE O/P EST LOW 20 MIN: CPT | Performed by: INTERNAL MEDICINE

## 2024-01-03 RX ORDER — NIRMATRELVIR AND RITONAVIR 300-100 MG
3 KIT ORAL 2 TIMES DAILY
Qty: 30 TABLET | Refills: 0 | Status: SHIPPED | OUTPATIENT
Start: 2024-01-03 | End: 2024-01-08

## 2024-01-03 RX ORDER — PREDNISONE 20 MG/1
40 TABLET ORAL DAILY
Qty: 10 TABLET | Refills: 0 | Status: SHIPPED | OUTPATIENT
Start: 2024-01-03 | End: 2024-01-08

## 2024-01-03 RX ORDER — MOMETASONE FUROATE AND FORMOTEROL FUMARATE DIHYDRATE 100; 5 UG/1; UG/1
2 AEROSOL RESPIRATORY (INHALATION) 2 TIMES DAILY
Qty: 13 G | Refills: 0 | Status: SHIPPED | OUTPATIENT
Start: 2024-01-03

## 2024-01-03 NOTE — PROGRESS NOTES
COVID-19 Outpatient Progress Note    Assessment/Plan:    Problem List Items Addressed This Visit       Mild intermittent asthma without complication    Relevant Medications    mometasone-formoterol (Dulera) 100-5 MCG/ACT inhaler    predniSONE 20 mg tablet    Seasonal allergic rhinitis due to pollen    Relevant Medications    mometasone-formoterol (Dulera) 100-5 MCG/ACT inhaler    predniSONE 20 mg tablet     Other Visit Diagnoses       COVID-19    -  Primary    Relevant Medications    nirmatrelvir & ritonavir (Paxlovid, 300/100,) tablet therapy pack    mometasone-formoterol (Dulera) 100-5 MCG/ACT inhaler    predniSONE 20 mg tablet           Disposition:     Discussed symptom directed medication options with patient. Discussed vitamin D, vitamin C, and/or zinc supplementation with patient.     Patient reports her symptoms of acute lower extremity gout have now improved.  He has stopped taking colchicine, recommend purine free diet avoi red meat and alcohol.  Discussed supportive management for COVID symptoms.  Follow-up if symptoms are not improved      Patient meets criteria for Paxlovid and they have been counseled appropriately regarding risks, benefits, side effects, and alternative treatment options. After discussion, patient agrees to treatment.    Possible side effects of Paxlovid?    Possible side effects of Paxlovid are:  - Liver Problems. Notify us right away if you start to experience loss of appetite, yellowing of your skin and the whites of eyes (jaundice), dark-colored urine, pale colored stools and itchy skin, stomach area (abdominal) pain.  - Resistance to HIV Medicines. If you have untreated HIV infection, Paxlovid may lead to some HIV medicines not working as well in the future.  - Other possible side effects include: altered sense of taste, diarrhea, high blood pressure, or muscle aches.    I have spent a total time of 15 minutes on the day of the encounter for this patient including discussing  diagnostic results, discussing prognosis, risks and benefits of treatment options, instructions for management, patient and family education, importance of treatment compliance, risk factor reductions, impressions and documenting in the medical record.       Encounter provider: Darline Wheatley MD     Provider located at: [facility]  [address] [facility]  [place] [address] [phone]     Recent Visits  No visits were found meeting these conditions.  Showing recent visits within past 7 days and meeting all other requirements  Today's Visits  Date Type Provider Dept   01/03/24 Telemedicine Darline Wheatley MD [facility]   Showing today's visits and meeting all other requirements  Future Appointments  No visits were found meeting these conditions.  Showing future appointments within next 150 days and meeting all other requirements     This virtual check-in was done via [employer] and patient was informed that this is a secure, HIPAA-compliant platform. He agrees to proceed.    Patient agrees to participate in a virtual check in via telephone or video visit instead of presenting to the office to address urgent/immediate medical needs. Patient is aware this is a billable service. He acknowledged consent and understanding of privacy and security of the video platform. The patient has agreed to participate and understands they can discontinue the visit at any time.    After connecting through [employer], the patient was identified by name and date of birth. Tiburcio Rashid was informed that this was a telemedicine visit and that the exam was being conducted confidentially over secure lines. My office door was closed. No one else was in the room. Tiburcio Rashid acknowledged consent and understanding of privacy and security of the telemedicine visit. I informed the patient that I have reviewed his record in  "Epic and presented the opportunity for him to ask any questions regarding the visit today. The patient agreed to participate.     Verification of patient location:  Patient is located in the following state in which I hold an active license: [place]    Subjective:   Tiburcio Rashid is a 53 y.o. male who is concerned about COVID-19. Patient's symptoms include nasal congestion, sore throat and cough. Patient denies fever, chills, fatigue, rhinorrhea, shortness of breath, chest tightness, abdominal pain, nausea, myalgias and headaches.     - Date of symptom onset: 1/2/2024      COVID-19 vaccination status: Fully vaccinated with booster    Exposure:   Contact with a person who is under investigation (PUI) for or who is positive for COVID-19 within the last 14 days?: Yes    Hospitalized recently for fever and/or lower respiratory symptoms?: No      Currently a healthcare worker that is involved in direct patient care?: No      Works in a special setting where the risk of COVID-19 transmission may be high? (this may include long-term care, correctional and alf facilities; homeless shelters; assisted-living facilities and group homes.): No      Resident in a special setting where the risk of COVID-19 transmission may be high? (this may include long-term care, correctional and alf facilities; homeless shelters; assisted-living facilities and group homes.): No      No results found for: \"SARSCOV2\", \"[id number]\", \"SARSCORONAVI\", \"CORONAVIRUSR\", \"SARSCOVAG\", \"SARSCOVAGH\"    Review of Systems   Constitutional:  Negative for activity change, appetite change, chills, diaphoresis, fatigue, fever and unexpected weight change.   HENT:  Positive for congestion and sore throat. Negative for drooling, ear discharge, ear pain, facial swelling, hearing loss, postnasal drip, rhinorrhea, sinus pressure, sinus pain, sneezing, trouble swallowing and voice change.    Respiratory:  Positive for cough. Negative for apnea, choking, chest " "tightness, shortness of breath, wheezing and stridor.    Cardiovascular:  Negative for chest pain, palpitations and leg swelling.   Gastrointestinal:  Negative for abdominal distention, abdominal pain, blood in stool, constipation, nausea and rectal pain.   Genitourinary:  Negative for dysuria, flank pain, frequency and urgency.   Musculoskeletal:  Negative for arthralgias, back pain, gait problem, joint swelling and myalgias.   Skin:  Negative for color change, pallor and rash.   Neurological:  Negative for dizziness and headaches.     Current Outpatient Medications on File Prior to Visit   Medication Sig    albuterol (PROVENTIL HFA,VENTOLIN HFA) 90 mcg/act inhaler Inhale 2 puffs every 6 (six) hours as needed for wheezing    allopurinol (ZYLOPRIM) 100 mg tablet Take 2 tablets (200 mg total) by mouth daily    [DISCONTINUED] colchicine (COLCRYS) 0.6 mg tablet Take 1 tablet (0.6 mg total) by mouth 2 (two) times a day (Patient taking differently: Take 0.6 mg by mouth if needed)    diclofenac (VOLTAREN) 75 mg EC tablet Take 1 tablet (75 mg total) by mouth 2 (two) times a day (Patient not taking: Reported on 1/3/2024)    methocarbamol (Robaxin-750) 750 mg tablet Take 1 tablet (750 mg total) by mouth every 6 (six) hours as needed for muscle spasms (Patient not taking: Reported on 1/3/2024)    omeprazole (PriLOSEC) 20 mg delayed release capsule Take 1 capsule (20 mg total) by mouth 2 (two) times a day (Patient not taking: Reported on 1/3/2024)       Objective:    Ht 6' 3\" (1.905 m)   Wt (!) 143 kg (316 lb)   BMI 39.50 kg/m²        Physical Exam  Constitutional:       General: He is not in acute distress.     Appearance: Normal appearance. He is not ill-appearing, toxic-appearing or diaphoretic.   Neurological:      Mental Status: He is alert and oriented to person, place, and time.       Darline Wheatley MD    "

## 2024-02-26 ENCOUNTER — TELEMEDICINE (OUTPATIENT)
Dept: INTERNAL MEDICINE CLINIC | Facility: CLINIC | Age: 54
End: 2024-02-26
Payer: COMMERCIAL

## 2024-02-26 VITALS — HEIGHT: 75 IN | WEIGHT: 315 LBS | BODY MASS INDEX: 39.17 KG/M2

## 2024-02-26 DIAGNOSIS — K21.9 GASTROESOPHAGEAL REFLUX DISEASE WITHOUT ESOPHAGITIS: ICD-10-CM

## 2024-02-26 DIAGNOSIS — M10.062 ACUTE IDIOPATHIC GOUT OF LEFT KNEE: ICD-10-CM

## 2024-02-26 DIAGNOSIS — M51.16 LUMBAR DISC HERNIATION WITH RADICULOPATHY: Primary | ICD-10-CM

## 2024-02-26 PROCEDURE — 99214 OFFICE O/P EST MOD 30 MIN: CPT | Performed by: INTERNAL MEDICINE

## 2024-02-26 RX ORDER — ALLOPURINOL 100 MG/1
200 TABLET ORAL DAILY
Qty: 60 TABLET | Refills: 5 | Status: SHIPPED | OUTPATIENT
Start: 2024-02-26

## 2024-02-26 RX ORDER — TIZANIDINE 4 MG/1
4 TABLET ORAL EVERY 8 HOURS PRN
Qty: 40 TABLET | Refills: 0 | Status: SHIPPED | OUTPATIENT
Start: 2024-02-26

## 2024-02-26 RX ORDER — DICLOFENAC SODIUM 75 MG/1
75 TABLET, DELAYED RELEASE ORAL 2 TIMES DAILY
Qty: 30 TABLET | Refills: 0 | Status: SHIPPED | OUTPATIENT
Start: 2024-02-26

## 2024-02-26 RX ORDER — COLCHICINE 0.6 MG/1
0.6 TABLET ORAL DAILY
Qty: 30 TABLET | Refills: 0 | Status: SHIPPED | OUTPATIENT
Start: 2024-02-26
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
## 2024-05-29 DIAGNOSIS — J40 BRONCHITIS: ICD-10-CM

## 2024-05-29 DIAGNOSIS — R09.81 SINUS CONGESTION: ICD-10-CM

## 2024-05-29 RX ORDER — ALBUTEROL SULFATE 90 UG/1
2 AEROSOL, METERED RESPIRATORY (INHALATION) EVERY 6 HOURS PRN
Qty: 8 G | Refills: 1 | Status: SHIPPED | OUTPATIENT
Start: 2024-05-29

## 2024-05-29 NOTE — TELEPHONE ENCOUNTER
Medication: albuterol (PROVENTIL HFA,VENTOLIN HFA) 90 mcg/act inhaler     Dose/Frequency:     Inhale 2 puffs every 6 (six) hours as needed for wheezing       Quantity: 8 g     Pharmacy: [address] [address]     [phone]    Office:   [x] PCP/Provider - Jeovanny   [] Speciality/Provider -     Does the patient have enough for 3 days?   [] Yes   [x] No - Send as HP to POD

## 2024-06-20 ENCOUNTER — OFFICE VISIT (OUTPATIENT)
Dept: INTERNAL MEDICINE CLINIC | Facility: CLINIC | Age: 54
End: 2024-06-20
Payer: COMMERCIAL

## 2024-06-20 VITALS
HEART RATE: 104 BPM | BODY MASS INDEX: 39.17 KG/M2 | SYSTOLIC BLOOD PRESSURE: 141 MMHG | WEIGHT: 315 LBS | OXYGEN SATURATION: 96 % | HEIGHT: 75 IN | TEMPERATURE: 98.4 F | DIASTOLIC BLOOD PRESSURE: 79 MMHG

## 2024-06-20 DIAGNOSIS — Z12.11 SCREEN FOR COLON CANCER: Primary | ICD-10-CM

## 2024-06-20 DIAGNOSIS — E66.01 CLASS 3 SEVERE OBESITY DUE TO EXCESS CALORIES WITH SERIOUS COMORBIDITY AND BODY MASS INDEX (BMI) OF 40.0 TO 44.9 IN ADULT (HCC): ICD-10-CM

## 2024-06-20 DIAGNOSIS — M76.32 ILIOTIBIAL BAND SYNDROME OF LEFT SIDE: ICD-10-CM

## 2024-06-20 DIAGNOSIS — Z12.11 SCREENING FOR COLON CANCER: ICD-10-CM

## 2024-06-20 DIAGNOSIS — M25.562 ACUTE PAIN OF LEFT KNEE: ICD-10-CM

## 2024-06-20 PROCEDURE — 96372 THER/PROPH/DIAG INJ SC/IM: CPT

## 2024-06-20 PROCEDURE — 99213 OFFICE O/P EST LOW 20 MIN: CPT | Performed by: INTERNAL MEDICINE

## 2024-06-20 RX ORDER — METHOCARBAMOL 500 MG/1
500 TABLET, FILM COATED ORAL 3 TIMES DAILY
Qty: 15 TABLET | Refills: 0 | Status: SHIPPED | OUTPATIENT
Start: 2024-06-20 | End: 2024-06-25

## 2024-06-20 RX ORDER — METHYLPREDNISOLONE ACETATE 40 MG/ML
40 INJECTION, SUSPENSION INTRA-ARTICULAR; INTRALESIONAL; INTRAMUSCULAR; SOFT TISSUE ONCE
Status: COMPLETED | OUTPATIENT
Start: 2024-06-20 | End: 2024-06-20

## 2024-06-20 RX ORDER — PREDNISONE 50 MG/1
50 TABLET ORAL DAILY
Qty: 7 TABLET | Refills: 0 | Status: SHIPPED | OUTPATIENT
Start: 2024-06-20 | End: 2024-06-27

## 2024-06-20 RX ADMIN — METHYLPREDNISOLONE ACETATE 40 MG: 40 INJECTION, SUSPENSION INTRA-ARTICULAR; INTRALESIONAL; INTRAMUSCULAR; SOFT TISSUE at 15:46

## 2024-06-20 NOTE — PROGRESS NOTES
Assessment/Plan:    Diagnoses and all orders for this visit:    Screen for colon cancer  -     Ambulatory Referral to Gastroenterology; Future    Screening for colon cancer    Class 3 severe obesity due to excess calories with serious comorbidity and body mass index (BMI) of 40.0 to 44.9 in adult (HCC)  -     Ambulatory Referral to Weight Management; Future    Iliotibial band syndrome of left side  -     predniSONE 50 mg tablet; Take 1 tablet (50 mg total) by mouth daily for 7 days  -     Ambulatory Referral to Orthopedic Surgery; Future  -     methocarbamol (ROBAXIN) 500 mg tablet; Take 1 tablet (500 mg total) by mouth 3 (three) times a day for 5 days    Acute pain of left knee  -     XR knee 1 or 2 vw left; Future              There are no Patient Instructions on file for this visit.    Subjective:      Patient ID: Tiburcio Rashid is a 54 y.o. male    HPI      Current Outpatient Medications:     albuterol (PROVENTIL HFA,VENTOLIN HFA) 90 mcg/act inhaler, Inhale 2 puffs every 6 (six) hours as needed for wheezing, Disp: 8 g, Rfl: 1    allopurinol (ZYLOPRIM) 100 mg tablet, Take 2 tablets (200 mg total) by mouth daily, Disp: 60 tablet, Rfl: 5    colchicine (COLCRYS) 0.6 mg tablet, Take 1 tablet (0.6 mg total) by mouth daily, Disp: 30 tablet, Rfl: 0    diclofenac (VOLTAREN) 75 mg EC tablet, Take 1 tablet (75 mg total) by mouth 2 (two) times a day, Disp: 30 tablet, Rfl: 0    methocarbamol (ROBAXIN) 500 mg tablet, Take 1 tablet (500 mg total) by mouth 3 (three) times a day for 5 days, Disp: 15 tablet, Rfl: 0    omeprazole (PriLOSEC) 20 mg delayed release capsule, Take 1 capsule (20 mg total) by mouth 2 (two) times a day, Disp: 60 capsule, Rfl: 1    predniSONE 50 mg tablet, Take 1 tablet (50 mg total) by mouth daily for 7 days, Disp: 7 tablet, Rfl: 0    tiZANidine (ZANAFLEX) 4 mg tablet, Take 1 tablet (4 mg total) by mouth every 8 (eight) hours as needed for muscle spasms, Disp: 40 tablet, Rfl: 0    mometasone-formoterol  (Dulera) 100-5 MCG/ACT inhaler, Inhale 2 puffs 2 (two) times a day Rinse mouth after use. (Patient not taking: Reported on 2/26/2024), Disp: 13 g, Rfl: 0     Past Medical History:   Diagnosis Date    Asthma     Asthmatic bronchitis     Body mass index 34.0-34.9, adult     Convulsions (HCC)     Diverticulitis     Diverticulosis     sigmoid colon - on colonoscopy 1/31/2011     Gastric ulcer     Gout     Left shoulder pain     Lumbar disc herniation with radiculopathy     Osteoarthritis     Pharyngitis     Pulmonary embolism (HCC) 08/15/2012    Right knee pain     Rotator cuff syndrome of left shoulder     Routine general medical examination at a health care facility     SOB (shortness of breath)     Spontaneous rupture of tendon of left shoulder     Syncope and collapse     Vestibular neuritis          Past Surgical History:   Procedure Laterality Date    COLON SURGERY  08/08/2012    Sigmoid colon removed for diverticulosis     EGD AND COLONOSCOPY  01/31/2011    Dr. So    SINUS SURGERY           Allergies   Allergen Reactions    Cefazolin Hives    Metronidazole Hives    Levofloxacin Palpitations       No results found for this or any previous visit (from the past 1008 hour(s)).    The following portions of the patient's history were reviewed and updated as appropriate: allergies, current medications, past family history, past medical history, past social history, past surgical history and problem list.     Review of Systems   Constitutional:  Negative for activity change, appetite change, chills, diaphoresis, fatigue, fever and unexpected weight change.   HENT:  Negative for congestion and sore throat.    Respiratory:  Negative for apnea, cough, choking, chest tightness, shortness of breath, wheezing and stridor.    Cardiovascular:  Negative for chest pain, palpitations and leg swelling.   Gastrointestinal:  Negative for abdominal distention, abdominal pain, blood in stool, constipation, nausea and rectal pain.    Genitourinary:  Negative for dysuria, flank pain, frequency and urgency.   Musculoskeletal:  Negative for arthralgias, back pain, gait problem, joint swelling and myalgias.        Pain and stiffness at the site of the diet, more pronounced at the left lateral knee joint line   Skin:  Negative for color change, pallor and rash.   Neurological:  Negative for headaches.         Objective:      Vitals:    06/20/24 1428   BP: 141/79   Pulse: 104   Temp: 98.4 °F (36.9 °C)   SpO2: 96%          Physical Exam  Vitals reviewed.   Constitutional:       General: He is not in acute distress.     Appearance: Normal appearance. He is not ill-appearing, toxic-appearing or diaphoretic.   HENT:      Mouth/Throat:      Mouth: Mucous membranes are moist.   Cardiovascular:      Rate and Rhythm: Normal rate and regular rhythm.      Pulses: Normal pulses.      Heart sounds: Normal heart sounds. No murmur heard.     No friction rub. No gallop.   Pulmonary:      Effort: Pulmonary effort is normal. No respiratory distress.      Breath sounds: Normal breath sounds. No stridor. No wheezing, rhonchi or rales.   Chest:      Chest wall: No tenderness.   Musculoskeletal:      Right upper leg: No tenderness.      Left upper leg: Tenderness present. No swelling, edema or deformity.      Right knee: Normal.      Left knee: Decreased range of motion. Tenderness present over the lateral joint line.      Right lower leg: No edema.      Left lower leg: No edema.   Skin:     General: Skin is warm and dry.      Findings: No lesion or rash.   Neurological:      Mental Status: He is alert.

## 2024-07-19 ENCOUNTER — OFFICE VISIT (OUTPATIENT)
Dept: INTERNAL MEDICINE CLINIC | Facility: CLINIC | Age: 54
End: 2024-07-19
Payer: COMMERCIAL

## 2024-07-19 VITALS
DIASTOLIC BLOOD PRESSURE: 78 MMHG | HEART RATE: 107 BPM | HEIGHT: 75 IN | OXYGEN SATURATION: 94 % | WEIGHT: 315 LBS | SYSTOLIC BLOOD PRESSURE: 166 MMHG | TEMPERATURE: 98.6 F | BODY MASS INDEX: 39.17 KG/M2

## 2024-07-19 DIAGNOSIS — M25.569 KNEE PAIN: Primary | ICD-10-CM

## 2024-07-19 DIAGNOSIS — M25.562 ACUTE PAIN OF LEFT KNEE: ICD-10-CM

## 2024-07-19 PROCEDURE — 99213 OFFICE O/P EST LOW 20 MIN: CPT | Performed by: INTERNAL MEDICINE

## 2024-07-19 PROCEDURE — 96372 THER/PROPH/DIAG INJ SC/IM: CPT

## 2024-07-19 RX ORDER — KETOROLAC TROMETHAMINE 30 MG/ML
30 INJECTION, SOLUTION INTRAMUSCULAR; INTRAVENOUS ONCE
Status: COMPLETED | OUTPATIENT
Start: 2024-07-19 | End: 2024-07-19

## 2024-07-19 RX ADMIN — KETOROLAC TROMETHAMINE 30 MG: 30 INJECTION, SOLUTION INTRAMUSCULAR; INTRAVENOUS at 15:49

## 2024-07-19 NOTE — PROGRESS NOTES
"Assessment/Plan:    Pain   L  Knee     Diagnoses and all orders for this visit:    Knee pain  -     Ambulatory Referral to Orthopedic Surgery; Future          Subjective: Pain on  the  L Knee  with  swelling  in the joint    HPI     Acute  exacerbation  L   Knee pain    The following portions of the patient's history were reviewed and updated as appropriate: allergies, current medications, past family history, past medical history, past social history, past surgical history, and problem list.    Review of Systems   Constitutional: Negative.    HENT:  Negative for dental problem, drooling, ear discharge and ear pain.    Eyes:  Negative for discharge, redness and itching.   Respiratory:  Negative for apnea, cough and wheezing.    Cardiovascular:  Negative for chest pain and palpitations.   Gastrointestinal:  Negative for abdominal pain, blood in stool, diarrhea and vomiting.   Endocrine: Negative for polydipsia, polyphagia and polyuria.   Genitourinary:  Negative for decreased urine volume, dysuria and frequency.   Musculoskeletal:  Positive for arthralgias and joint swelling. Negative for myalgias and neck stiffness.   Skin:  Negative for pallor and wound.   Allergic/Immunologic: Negative for environmental allergies and food allergies.   Neurological:  Negative for facial asymmetry, light-headedness, numbness and headaches.   Hematological:  Negative for adenopathy. Does not bruise/bleed easily.   Psychiatric/Behavioral:  Negative for agitation, behavioral problems and confusion.          Objective:      /78 (BP Location: Left arm, Patient Position: Sitting, Cuff Size: Large)   Pulse (!) 107   Temp 98.6 °F (37 °C) (Temporal)   Ht 6' 3\" (1.905 m)   Wt (!) 153 kg (337 lb)   SpO2 94%   BMI 42.12 kg/m²          Physical Exam  Constitutional:       Appearance: Normal appearance. He is obese.   HENT:      Head: Normocephalic.      Nose: Nose normal.      Mouth/Throat:      Mouth: Mucous membranes are moist. "   Eyes:      Pupils: Pupils are equal, round, and reactive to light.   Cardiovascular:      Rate and Rhythm: Regular rhythm.      Heart sounds: Normal heart sounds.   Pulmonary:      Breath sounds: Normal breath sounds.   Abdominal:      Palpations: Abdomen is soft.   Musculoskeletal:         General: No swelling.      Cervical back: Neck supple.   Skin:     General: Skin is warm.   Neurological:      General: No focal deficit present.      Mental Status: He is alert and oriented to person, place, and time.   Psychiatric:         Mood and Affect: Mood normal.       Swelling  and  pain  L  knee joint  with  small  effusion.    Ketorolac IM   Orthopedic  consult  Monday 7/22 /2024      Fup  after  seen by  Orthopedic.    Hamilton Salas MD  FACP

## 2024-07-22 ENCOUNTER — OFFICE VISIT (OUTPATIENT)
Dept: OBGYN CLINIC | Facility: MEDICAL CENTER | Age: 54
End: 2024-07-22
Payer: COMMERCIAL

## 2024-07-22 ENCOUNTER — APPOINTMENT (OUTPATIENT)
Dept: RADIOLOGY | Facility: MEDICAL CENTER | Age: 54
End: 2024-07-22
Payer: COMMERCIAL

## 2024-07-22 VITALS
HEART RATE: 82 BPM | HEIGHT: 75 IN | SYSTOLIC BLOOD PRESSURE: 150 MMHG | DIASTOLIC BLOOD PRESSURE: 90 MMHG | WEIGHT: 315 LBS | BODY MASS INDEX: 39.17 KG/M2

## 2024-07-22 DIAGNOSIS — M25.569 KNEE PAIN: Primary | ICD-10-CM

## 2024-07-22 DIAGNOSIS — M76.32 ILIOTIBIAL BAND SYNDROME OF LEFT SIDE: ICD-10-CM

## 2024-07-22 DIAGNOSIS — M25.569 KNEE PAIN: ICD-10-CM

## 2024-07-22 DIAGNOSIS — M22.2X2 PATELLOFEMORAL DISORDER OF LEFT KNEE: ICD-10-CM

## 2024-07-22 DIAGNOSIS — M17.12 PRIMARY OSTEOARTHRITIS OF LEFT KNEE: ICD-10-CM

## 2024-07-22 PROCEDURE — 20610 DRAIN/INJ JOINT/BURSA W/O US: CPT | Performed by: EMERGENCY MEDICINE

## 2024-07-22 PROCEDURE — 73564 X-RAY EXAM KNEE 4 OR MORE: CPT

## 2024-07-22 PROCEDURE — 99204 OFFICE O/P NEW MOD 45 MIN: CPT | Performed by: EMERGENCY MEDICINE

## 2024-07-22 RX ORDER — TRIAMCINOLONE ACETONIDE 40 MG/ML
40 INJECTION, SUSPENSION INTRA-ARTICULAR; INTRAMUSCULAR
Status: COMPLETED | OUTPATIENT
Start: 2024-07-22 | End: 2024-07-22

## 2024-07-22 RX ORDER — ROPIVACAINE HYDROCHLORIDE 2 MG/ML
4 INJECTION, SOLUTION EPIDURAL; INFILTRATION; PERINEURAL
Status: SHIPPED | OUTPATIENT
Start: 2024-07-22

## 2024-07-22 RX ADMIN — ROPIVACAINE HYDROCHLORIDE 4 ML: 2 INJECTION, SOLUTION EPIDURAL; INFILTRATION; PERINEURAL at 18:07

## 2024-07-22 RX ADMIN — TRIAMCINOLONE ACETONIDE 40 MG: 40 INJECTION, SUSPENSION INTRA-ARTICULAR; INTRAMUSCULAR at 11:30

## 2024-07-22 NOTE — PROGRESS NOTES
Assessment/Plan:    Diagnoses and all orders for this visit:    Knee pain  -     Ambulatory Referral to Orthopedic Surgery  -     XR knee 4+ vw left injury; Future  -     Large joint arthrocentesis: L knee  -     ropivacaine (NAROPIN) injection 4 mL    Primary osteoarthritis of left knee  -     Large joint arthrocentesis: L knee  -     ropivacaine (NAROPIN) injection 4 mL    Patellofemoral disorder of left knee  -     Large joint arthrocentesis: L knee  -     ropivacaine (NAROPIN) injection 4 mL    Iliotibial band syndrome of left side  -     Ambulatory Referral to Orthopedic Surgery    Left Knee IA CSI provided today, as signs and symptoms are consistent with x-ray findings of degenerative joint of the patellofemoral compartment.  Reviewed prior outside Ortho and ED notes,as well as prior Xray     Return in about 4 weeks (around 8/19/2024).      Subjective:   Patient ID: Tiburcio Rashid is a 54 y.o. male.    NP with hx Gouty arthritis and DVT presents for Left knee pain occurring after standing up recently, and states he has been experiencing this type of pain more frequently, as well as Right knee pain.    Hx of diclofenac, PREDNISONE        Review of Systems    The following portions of the patient's chart were reviewed and updated as appropriate:   Allergy:    Allergies   Allergen Reactions    Cefazolin Hives    Metronidazole Hives    Levofloxacin Palpitations       Medications:    Current Outpatient Medications:     albuterol (PROVENTIL HFA,VENTOLIN HFA) 90 mcg/act inhaler, Inhale 2 puffs every 6 (six) hours as needed for wheezing, Disp: 8 g, Rfl: 1    allopurinol (ZYLOPRIM) 100 mg tablet, Take 2 tablets (200 mg total) by mouth daily, Disp: 60 tablet, Rfl: 5    colchicine (COLCRYS) 0.6 mg tablet, Take 1 tablet (0.6 mg total) by mouth daily, Disp: 30 tablet, Rfl: 0    omeprazole (PriLOSEC) 20 mg delayed release capsule, Take 1 capsule (20 mg total) by mouth 2 (two) times a day, Disp: 60 capsule, Rfl: 1     "tiZANidine (ZANAFLEX) 4 mg tablet, Take 1 tablet (4 mg total) by mouth every 8 (eight) hours as needed for muscle spasms, Disp: 40 tablet, Rfl: 0    methocarbamol (ROBAXIN) 500 mg tablet, Take 1 tablet (500 mg total) by mouth 3 (three) times a day for 5 days, Disp: 15 tablet, Rfl: 0    mometasone-formoterol (Dulera) 100-5 MCG/ACT inhaler, Inhale 2 puffs 2 (two) times a day Rinse mouth after use. (Patient not taking: Reported on 2/26/2024), Disp: 13 g, Rfl: 0    Current Facility-Administered Medications:     ropivacaine (NAROPIN) injection 4 mL, 4 mL, Intra-articular, Titrated,     Patient Active Problem List   Diagnosis    Gout    Asthma    Diverticulitis    Diverticulosis large intestine w/o perforation or abscess w/o bleeding    Body mass index 37.0-37.9, adult    Gastroesophageal reflux disease without esophagitis    Lumbar disc herniation with radiculopathy    Lumbar spondylosis    Chronic pain syndrome    Sacroiliitis (HCC)    Mild intermittent asthma without complication    Impaired fasting blood sugar    Mixed hyperlipidemia    Tracheitis    Acute idiopathic gout of left knee    Acute deep vein thrombosis (DVT) of left peroneal vein (HCC)    Fungal infection    Acute idiopathic gout of left foot    Seasonal allergic rhinitis due to pollen       Objective:  /90   Pulse 82   Ht 6' 3\" (1.905 m)   Wt (!) 155 kg (342 lb)   BMI 42.75 kg/m²     Left Knee Exam     Tenderness   The patient is experiencing tenderness in the patella.    Range of Motion   The patient has normal left knee ROM.    Tests   Lachman:  Anterior - negative    Posterior - negative    Other   Erythema: absent  Sensation: normal  Swelling: mild  Effusion: effusion present          Observations   Left Knee   Positive for effusion.       Physical Exam  Musculoskeletal:      Left knee: Effusion present.           Neurologic Exam    Large joint arthrocentesis: L knee  Universal Protocol:  Consent: Verbal consent obtained.  Risks and " "benefits: risks, benefits and alternatives were discussed  Consent given by: patient  Time out: Immediately prior to procedure a \"time out\" was called to verify the correct patient, procedure, equipment, support staff and site/side marked as required.  Timeout called at: 7/22/2024 12:04 PM.  Patient understanding: patient states understanding of the procedure being performed  Test results: test results available and properly labeled  Site marked: the operative site was marked  Patient identity confirmed: verbally with patient  Supporting Documentation  Indications: pain   Procedure Details  Location: knee - L knee  Preparation: Patient was prepped and draped in the usual sterile fashion  Needle size: 22 G  Ultrasound guidance: no  Approach: anterolateral  Medications administered: 40 mg triamcinolone acetonide 40 mg/mL    Patient tolerance: patient tolerated the procedure well with no immediate complications  Dressing:  Sterile dressing applied    No erythema of knee(s)        I have personally reviewed pertinent films in PACS and my interpretation is Xray Left Knee:  FP DJD  with small effusion,             Past Medical History:   Diagnosis Date    Asthma     Asthmatic bronchitis     Body mass index 34.0-34.9, adult     Convulsions (HCC)     Diverticulitis     Diverticulosis     sigmoid colon - on colonoscopy 1/31/2011     Gastric ulcer     Gout     Left shoulder pain     Lumbar disc herniation with radiculopathy     Osteoarthritis     Pharyngitis     Pulmonary embolism (HCC) 08/15/2012    Right knee pain     Rotator cuff syndrome of left shoulder     Routine general medical examination at a health care facility     SOB (shortness of breath)     Spontaneous rupture of tendon of left shoulder     Syncope and collapse     Vestibular neuritis        Past Surgical History:   Procedure Laterality Date    COLON SURGERY  08/08/2012    Sigmoid colon removed for diverticulosis     EGD AND COLONOSCOPY  01/31/2011     " [facility]    SINUS SURGERY         Social History     Socioeconomic History    Marital status: /Civil Union     Spouse name: Not on file    Number of children: Not on file    Years of education: Not on file    Highest education level: Not on file   Occupational History    Not on file   Tobacco Use    Smoking status: Never    Smokeless tobacco: Never   Vaping Use    Vaping status: Never Used   Substance and Sexual Activity    Alcohol use: Not Currently    Drug use: Never    Sexual activity: Not on file   Other Topics Concern    Not on file   Social History Narrative    Not on file     Social Determinants of Health     Financial Resource Strain: Not on file   Food Insecurity: Not on file   Transportation Needs: Not on file   Physical Activity: Not on file   Stress: Not on file   Social Connections: Not on file   Intimate Partner Violence: Not on file   Housing Stability: Not on file       Family History   Problem Relation Age of Onset    Hypertension Mother     Hyperlipidemia Mother

## 2024-07-22 NOTE — PATIENT INSTRUCTIONS
In order to minimize further degenerative changes and pain from arthritis we recommend maintaining an active lifestyle and continually trying to maintain a healthy weight / BMI (Body Mass Index).  If you are interested we can refer you to Weight Management to help with weight loss.  I recommended avoiding any tobacco use.  On rainy days and cold days you may have worsening pain than baseline.    [email or web address] for more information     Steroid Joint Injection   AMBULATORY CARE:   What you need to know about steroid joint injection:  A steroid joint injection is a procedure to inject steroid medicine into a joint. Steroid medicine decreases pain and inflammation. The injection may also contain an anesthetic (numbing medicine) to decrease pain. It may be done to treat conditions such as arthritis, gout, or carpal tunnel syndrome. The injections may be given in your knee, ankle, shoulder, elbow, wrist, or ankle.   How to prepare for steroid joint injection:  Your healthcare provider will talk to you about how to prepare for this procedure. He will tell you what medicines to take or not take on the day of your procedure. You may need to stop taking blood thinners several days before your procedure.   What will happen during steroid joint injection:  You may be given local anesthesia to numb the area where the injection will be given. With local anesthesia, you may still feel pressure during the procedure, but you should not feel any pain. Your healthcare provider may use ultrasound or fluoroscopy (a type of x-ray) to guide the needle to the right area. He will then inject the steroid into your joint. A bandage will be placed on the injection site.   What will happen after steroid joint injection:  You may have redness, warmth, or sweating in your face and chest right after the steroid injection. Steroids can affect blood sugar levels. If you have diabetes, you should check your blood sugars closely in the first 24  hours after your procedure.   Risks of steroid joint injection:  You may get an infection in your joint. The injection may also cause more pain during the first 24 to 36 hours. You may need more than one injection to feel pain relief. The skin near the injection site may be damaged and become discolored or indented. This can happen if the steroid is placed too close to your skin. A tendon near the injection site may rupture or a nerve can be damaged.  Contact your healthcare provider if:   You have fever or chills.     You have redness or swelling in the injection site.     You have more pain than usual in your joint for more than 72 hours.     You have questions or concerns about your condition or care.  Medicines:   Pain medicine  may be given. Ask how to take this medicine safely.     Take your medicine as directed.  Contact your healthcare provider if you think your medicine is not helping or if you have side effects. Tell him or her if you are allergic to any medicine. Keep a list of the medicines, vitamins, and herbs you take. Include the amounts, and when and why you take them. Bring the list or the pill bottles to follow-up visits. Carry your medicine list with you in case of an emergency.  Self-care:   Leave the bandage on for 8 to 12 hours.  Care for your wound as directed.    Rest the area  as directed. You may need to decrease weight on certain joints, such as the knee, for a period of time. Ask when you can return to your daily activities.     Elevate  your limb where the steroid injection was given. Elevate the limb above the level of your heart as often as you can. This will help decrease swelling and pain. Prop your limb on pillows or blankets to keep it elevated comfortably.     Apply ice  on your joint for 15 to 20 minutes every hour or as directed. Use an ice pack, or put crushed ice in a plastic bag. Cover it with a towel. Ice helps prevent tissue damage and decreases swelling and pain.  Follow  up with your healthcare provider as directed:  Write down your questions so you remember to ask them during your visits.   © 2017 [employer] Information is for End User's use only and may not be sold, redistributed or otherwise used for commercial purposes. All illustrations and images included in CareNotes® are the copyrighted property of [employer] or [employer].  The above information is an  only. It is not intended as medical advice for individual conditions or treatments. Talk to your doctor, nurse or pharmacist before following any medical regimen to see if it is safe and effective for you.      Arthritis   AMBULATORY CARE:   Arthritis  is a disease that causes inflammation in one or more joints. There are many types of arthritis, such as osteoarthritis, rheumatoid arthritis, and septic arthritis. Some types cause inflammation in the joints. Other types wear away the cartilage between joints. This makes the bones of the joint rub together when you move the joint. Your symptoms may be constant, or symptoms may come and go. Arthritis often gets worse over time and can cause permanent joint damage.  Common signs and symptoms of arthritis:   Pain, swelling, or stiffness in the joint    Limited range of motion in the joint    Warmth or redness over the joint    Tenderness when you touch the joint    Stiff joints in the morning that loosen with movement    A creaking or grinding sound when you move the joint    Fever  Seek care immediately if:   You have a fever and severe joint pain or swelling.     You cannot move the affected joint.     You have severe joint pain you cannot tolerate.  Contact your healthcare provider if:   Your pain or swelling does not get better with treatment.    You have questions or concerns about your condition or care.  Treatment  will depend on the type of arthritis you have and if it is severe. You may need any of the  following:  Acetaminophen  decreases pain and fever. It is available without a doctor's order. Ask how much to take and how often to take it. Follow directions. Acetaminophen can cause liver damage if not taken correctly.    NSAIDs , such as ibuprofen, help decrease swelling, pain, and fever. This medicine is available with or without a doctor's order. NSAIDs can cause stomach bleeding or kidney problems in certain people. If you take blood thinner medicine, always ask your healthcare provider if NSAIDs are safe for you. Always read the medicine label and follow directions.    Steroid medicine  helps reduce swelling and pain.     Surgery  may be needed to repair or replace a damaged joint.  Manage arthritis:   Rest your painful joint so it can heal.  Your healthcare provider may recommend crutches or a walker if the affected joint is in a leg.     Apply ice or heat to the joint.  Both can help decrease swelling and pain. Ice may also help prevent tissue damage. Use an ice pack, or put crushed ice in a plastic bag. Cover it with a towel and place it on your joint for 15 to 20 minutes every hour or as directed. You can apply heat for 20 minutes every 2 hours. Heat treatment includes hot packs or heat lamps.    Elevate your joint.  Elevation helps reduce swelling and pain. Raise your joint above the level of your heart as often as you can. Prop your painful joint on pillows to keep it above your heart comfortably.    Go to physical or occupational therapy as directed.  A physical therapist can teach you exercises to improve flexibility and range of motion. You may also be shown non-weight-bearing exercises that are safe for your joints, such as swimming. Exercise can help keep your joints flexible and reduce pain. An occupational therapist can help you learn to do your daily activities when your joints are stiff or sore.    Maintain a healthy weight.  Extra weight puts increased pressure on your joints. Ask your  healthcare provider what you should weigh. If you need to lose weight, he can help you create a weight loss program. Weight loss can help reduce pain and increase your ability to do your activities. The amount of exercise you do may vary each day, depending on your symptoms.    Wear flat or low-heeled shoes.  This will help decrease pain and reduce pressure on your ankle, knee, and hip joints.    Use support devices as directed.  You may be given splints to wear on your hands to help your joints rest and to decrease inflammation. While you sleep, use a pillow that is firm enough to support your neck and head.  Other equipment  that may help you move and prevent falls:  Orthotic shoes or insoles  help support your feet when you walk.    Crutches, a cane, or a walker  may help decrease your risk for falling. They also decrease stress on affected joints.     Devices to prevent falls  include raised toilet seats and bathtub bars to help you get up from sitting. Handrails can be placed in areas where you need balance and support.  Follow up with your healthcare provider or rheumatologist as directed:  Write down your questions so you remember to ask them during your visits.  © 2017 [employer] Information is for End User's use only and may not be sold, redistributed or otherwise used for commercial purposes. All illustrations and images included in CareNotes® are the copyrighted property of [employer] or [employer].  The above information is an  only. It is not intended as medical advice for individual conditions or treatments. Talk to your doctor, nurse or pharmacist before following any medical regimen to see if it is safe and effective for you.\

## 2024-07-23 ENCOUNTER — TELEPHONE (OUTPATIENT)
Dept: OBGYN CLINIC | Facility: HOSPITAL | Age: 54
End: 2024-07-23

## 2024-07-23 NOTE — TELEPHONE ENCOUNTER
Caller: Tawana (spouse)    Doctor: Kishore    Reason for call: Patient had increased knee pain, just seen Monday, Had steroid injection was going to schedule another appointment. I advised to give the cortisone a week or to to kick in. I offered him an appointment tomorrow at 2:00 Pm but could not make that and then there are no more appointment for the rest of the week.    Not sure if physician could advise anything else until his follow up appointment?    Call back#: [phone]

## 2024-07-23 NOTE — TELEPHONE ENCOUNTER
Called and spoke with pts wife, Tawana. She states that pts pain is a little bit worse than normal, no redness or swelling at the injection site. Advised that it can take some time before the injection starts to provide relief. Advised rest, ice, compression with a knee sleeve or ace wrap to provide support, Tylenol and NSAID's combined if not contraindicated and offloading with a cane/walker if he has one. She stated understanding and will CB with any other questions/concerns.

## 2024-08-02 ENCOUNTER — OFFICE VISIT (OUTPATIENT)
Dept: INTERNAL MEDICINE CLINIC | Facility: CLINIC | Age: 54
End: 2024-08-02
Payer: COMMERCIAL

## 2024-08-02 VITALS
OXYGEN SATURATION: 98 % | DIASTOLIC BLOOD PRESSURE: 96 MMHG | TEMPERATURE: 98.2 F | HEART RATE: 107 BPM | SYSTOLIC BLOOD PRESSURE: 120 MMHG | WEIGHT: 315 LBS | BODY MASS INDEX: 39.17 KG/M2 | HEIGHT: 75 IN

## 2024-08-02 DIAGNOSIS — M25.462 PAIN AND SWELLING OF LEFT KNEE: Primary | ICD-10-CM

## 2024-08-02 DIAGNOSIS — M25.562 PAIN AND SWELLING OF LEFT KNEE: Primary | ICD-10-CM

## 2024-08-02 DIAGNOSIS — Z12.11 SCREENING FOR COLON CANCER: ICD-10-CM

## 2024-08-02 PROCEDURE — 99213 OFFICE O/P EST LOW 20 MIN: CPT | Performed by: INTERNAL MEDICINE

## 2024-08-02 NOTE — PROGRESS NOTES
"Assessment/Plan:    Pain  and  swelling  L  knee     Diagnoses and all orders for this visit:    Pain and swelling of left knee  -     MRI arthrogram left knee; Future  -     Ambulatory Referral to Orthopedic Surgery; Future    Screening for colon cancer  -     Ambulatory Referral to Gastroenterology; Future          SubjectivePain  and  swelling  L  knee      Patient ID: Tiburcio Rashid is a 54 y.o. male.    Knee Pain   Pertinent negatives include no numbness.       The following portions of the patient's history were reviewed and updated as appropriate: allergies, current medications, past family history, past medical history, past social history, past surgical history, and problem list.    Review of Systems   Constitutional: Negative.    HENT:  Negative for dental problem, drooling, ear discharge and ear pain.    Eyes:  Negative for discharge, redness and itching.   Respiratory:  Negative for apnea, cough and wheezing.    Cardiovascular:  Negative for chest pain and palpitations.   Gastrointestinal:  Negative for abdominal pain, blood in stool, diarrhea and vomiting.   Endocrine: Negative for polydipsia, polyphagia and polyuria.   Genitourinary:  Negative for decreased urine volume, dysuria and frequency.   Musculoskeletal:  Positive for arthralgias and joint swelling. Negative for myalgias and neck stiffness.   Skin:  Negative for pallor and wound.   Allergic/Immunologic: Negative for environmental allergies and food allergies.   Neurological:  Negative for facial asymmetry, light-headedness, numbness and headaches.   Hematological:  Negative for adenopathy. Does not bruise/bleed easily.   Psychiatric/Behavioral:  Negative for agitation, behavioral problems and confusion.          Objective:      /96 (BP Location: Left leg, Patient Position: Sitting, Cuff Size: Standard)   Pulse (!) 107   Temp 98.2 °F (36.8 °C) (Temporal)   Ht 6' 3\" (1.905 m)   Wt (!) 149 kg (329 lb)   SpO2 98%   BMI 41.12 kg/m² "     Physical   exam  shows   swelling  and   pain  L  knee         MRI  L  lucero  ordered    Referral to  Orthopedic  doctor     Diclofenac   ordered.       Fup  after  seen  by  Cece Salas MD.FACP

## 2024-09-19 ENCOUNTER — TELEPHONE (OUTPATIENT)
Dept: INTERNAL MEDICINE CLINIC | Facility: CLINIC | Age: 54
End: 2024-09-19

## 2024-09-19 ENCOUNTER — NURSE TRIAGE (OUTPATIENT)
Age: 54
End: 2024-09-19

## 2024-09-19 DIAGNOSIS — M10.9 ACUTE GOUT: Primary | ICD-10-CM

## 2024-09-19 RX ORDER — PREDNISONE 20 MG/1
20 TABLET ORAL DAILY
Qty: 7 TABLET | Refills: 0 | Status: SHIPPED | OUTPATIENT
Start: 2024-09-19 | End: 2024-09-26

## 2024-09-19 NOTE — TELEPHONE ENCOUNTER
"Pt is having L foot pain on the side and under arch. Pt states this is d/t a gout flare up. Pt rates the pain a 7/10 when sitting and says its unbearable to walk with. Pt is asking for a script for prednisone because that's the only thing that clears it up and makes the pain manageable.     Called office clinical line and spoke with Lien. Lien said Dr. Up is out but she would get a message over to covering provider and have someone call pt back regarding this request. Relayed message to pt and he said he would keep his phone on him.     Please f/u with pt         Reason for Disposition   SEVERE pain (e.g., excruciating, unable to do any normal activities)    Answer Assessment - Initial Assessment Questions  1. ONSET: \"When did the pain start?\"       Yesterday morning   2. LOCATION: \"Where is the pain located?\"       L foot, L side or arch and under it   3. PAIN: \"How bad is the pain?\"    (Scale 1-10; or mild, moderate, severe)   - MILD (1-3): doesn't interfere with normal activities.    - MODERATE (4-7): interferes with normal activities (e.g., work or school) or awakens from sleep, limping.    - SEVERE (8-10): excruciating pain, unable to do any normal activities, unable to walk.       6-7/10 and if he has to walk on it or stand its unbearable   4. WORK OR EXERCISE: \"Has there been any recent work or exercise that involved this part of the body?\"       Denies-gout   5. CAUSE: \"What do you think is causing the foot pain?\"      Gout   6. OTHER SYMPTOMS: \"Do you have any other symptoms?\" (e.g., leg pain, rash, fever, numbness)      Denies    Protocols used: Foot Pain-ADULT-[place]    "

## 2024-09-19 NOTE — TELEPHONE ENCOUNTER
Patient called and stated he is having a flare up of his gout In his foot and Dr Up usually gives him a steroid to take , so he was wanting to know if he could get a steroid called in because he can hardly walk on it , you can call him with any questions ,

## 2024-09-19 NOTE — TELEPHONE ENCOUNTER
Regarding: Gout  ----- Message from Jaida FLORENCE sent at 9/19/2024  3:50 PM EDT -----  Patient called stating has a gout flare up that started yesterday. States is mostly in his left foot, some trouble walking, pain scale: 7/10, is worse when he has to stand or walk. Patient has a history of gout and states last flare up was around 9 months ago.

## 2024-11-07 DIAGNOSIS — M51.16 LUMBAR DISC HERNIATION WITH RADICULOPATHY: ICD-10-CM

## 2024-11-07 RX ORDER — PREDNISONE 50 MG/1
50 TABLET ORAL DAILY
Qty: 7 TABLET | Refills: 0 | Status: SHIPPED | OUTPATIENT
Start: 2024-11-07 | End: 2024-11-14

## 2024-12-09 ENCOUNTER — PREP FOR PROCEDURE (OUTPATIENT)
Age: 54
End: 2024-12-09

## 2024-12-09 ENCOUNTER — TELEPHONE (OUTPATIENT)
Age: 54
End: 2024-12-09

## 2024-12-09 DIAGNOSIS — Z86.0100 HISTORY OF COLON POLYPS: Primary | ICD-10-CM

## 2024-12-09 NOTE — TELEPHONE ENCOUNTER
12/09/24  Screened by: Ana Goel    Referring Provider     Pre- Screening:     There is no height or weight on file to calculate BMI.  Has patient been referred for a routine screening Colonoscopy? yes  Is the patient between 45-75 years old? yes      Previous Colonoscopy yes   If yes:    Date: 2012    Facility: [facility]    Reason: Hx of pylops          Does the patient want to see a Gastroenterologist prior to their procedure OR are they having any GI symptoms? no    Has the patient been hospitalized or had abdominal surgery in the past 6 months? no    Does the patient use supplemental oxygen? no    Does the patient take Coumadin, Lovenox, Plavix, Elliquis, Xarelto, or other blood thinning medication? no    Has the patient had a stroke, cardiac event, or stent placed in the past year? no        If patient is between 45yrs - 49yrs, please advise patient that we will have to confirm benefits & coverage with their insurance company for a routine screening colonoscopy.

## 2024-12-09 NOTE — LETTER
Tiburcio Rashid  [address] [place] [address] [place] [id number]                Medicine Instructions for Adults with Diabetes who Need a Bowel Prep       Follow these instructions when a BOWEL PREP is required for your procedure or surgery!    NOTE:   GLP-1 Agonists taken weekly: do not take in the 7 days before your procedure   SGLT-2 Inhibitors: do not take in the 4 days before your procedure     On the Day Before Surgery/Procedure  If you are having a procedure (e.g. Colonoscopy) or surgery that requires a bowel prep and you may have at least a clear liquid diet, follow the directions below based on the type of medicine you take for your diabetes.     Type of Medicine You Take Examples What to do   Pre-Mixed Insulin - Intermediate Acting Humalog® 75/25, Humulin® 70/30, Novolog® 70/30, Regular Insulin Take ½ your regular dose the evening before your procedure   Rapid/Fast Acting Insulin Humalog® U200, NovoLog®, Apidra®, Fiasp® Take ½ your regular dose the evening before your procedure.   Long-Acting Insulin Lantus®, Levemir®, Tresiba®, Toujeo®, Basaglar® Take your FULL regular dose the day before procedure   Oral Sulfonylurea Glipizide/Glimepiride/Glucotrol® Take ½ your regular dose the evening before your procedure   Other Oral Diabetes Medicines Metformin®, Glucophage®, Glucophage XR®, Riomet®, Glumetza®), Actose®, Avandia®, Glyset®, Prandin® Take your regular dose with dinner in the evening before your procedure   GLP-1 Agonists AdlyxinÒ, ByettaÒ, BydureonÒ, OzempicÒ, SoliquaÒ, TanzeumÒ, TrulicityÒ, VictozaÒ, Saxenda®, Rybelsus® If taken daily, take as normal    If taken weekly, do not take this medicine for 7 days before your procedure including the day of the procedure (resume taking after the procedure)   SGLT-2 Inhibitors Jardiance®, Invokana®, Farxiga®,   Steglatro®, Brenzavvy®, Qtern®, Segluromet®, Glyxambi®, Synjardy®, Synjardy XR®, Invokamet®, Invokamet XR®, Trijary XR®, Xigduo XR®, Steglujan® Do not take  for 4 days before your procedure including the day of the procedure (resume taking after the procedure)                More information continued on back                    Medicine Instructions for Adults with Diabetes who Need a Bowel Prep  Page 2      On the Day of Surgery/Procedure  Follow the directions below based on the type of medicine you take for your diabetes.     Type of Medicine You Take Examples What to do   Long-Acting Insulin Lantus®, Levemir®, Tresiba®, Toujeo®, Basaglar®, Semglee®   If you usually take your Long-Acting Insulin in the morning, take the full dose as scheduled.   GLP-1 Agonists AdlyxinÒ, ByettaÒ, BydureonÒ, OzempicÒ, SoliquaÒ, TanzeumÒ, TrulicityÒ, VictozaÒ, Saxenda®, Rybelsus® Do NOT take this medicine on the day of your procedure (resume taking after the procedure)       On the Day of Surgery/Procedure (continued)  Except for the morning Long-Acting Insulin, DO NOT take ANY diabetic medicine on the day of your procedure unless you were instructed by the doctor who manages your diabetes medicines.    Continue to check your blood sugars.  If you have an insulin pump, ask your endocrinologist for instructions at least 3 days before your procedure. NOTE: If you are not able to ask your endocrinologist in advance, on the day of the procedure set your insulin pump to your basal rate only. Bring your insulin pump supplies to the hospital.     If you have any questions about taking your diabetes medicines prior to your procedure, please contact the doctor who manages your diabetes medicines.      ------------------------------------------------------------------------------------------------------------

## 2024-12-09 NOTE — TELEPHONE ENCOUNTER
Scheduled date of colonoscopy (as of today):12/23/2024  Physician performing colonoscopy:   Location of colonoscopy: [facility]  Bowel prep reviewed with patient: Ana Goel  Instructions reviewed with patient by:Ana Goel  Clearances: N/A    BRITTANY DUL prep sent via Danger

## 2024-12-23 ENCOUNTER — ANESTHESIA (OUTPATIENT)
Dept: GASTROENTEROLOGY | Facility: HOSPITAL | Age: 54
End: 2024-12-23
Payer: COMMERCIAL

## 2024-12-23 ENCOUNTER — HOSPITAL ENCOUNTER (OUTPATIENT)
Dept: GASTROENTEROLOGY | Facility: HOSPITAL | Age: 54
Setting detail: OUTPATIENT SURGERY
Discharge: HOME | End: 2024-12-23
Attending: INTERNAL MEDICINE
Payer: COMMERCIAL

## 2024-12-23 ENCOUNTER — ANESTHESIA EVENT (OUTPATIENT)
Dept: GASTROENTEROLOGY | Facility: HOSPITAL | Age: 54
End: 2024-12-23
Payer: COMMERCIAL

## 2024-12-23 VITALS
BODY MASS INDEX: 39.17 KG/M2 | WEIGHT: 315 LBS | RESPIRATION RATE: 20 BRPM | DIASTOLIC BLOOD PRESSURE: 89 MMHG | SYSTOLIC BLOOD PRESSURE: 160 MMHG | TEMPERATURE: 98.1 F | HEIGHT: 75 IN | OXYGEN SATURATION: 98 % | HEART RATE: 95 BPM

## 2024-12-23 DIAGNOSIS — Z86.0100 HISTORY OF COLON POLYPS: ICD-10-CM

## 2024-12-23 PROCEDURE — 88305 TISSUE EXAM BY PATHOLOGIST: CPT | Performed by: STUDENT IN AN ORGANIZED HEALTH CARE EDUCATION/TRAINING PROGRAM

## 2024-12-23 PROCEDURE — 45385 COLONOSCOPY W/LESION REMOVAL: CPT | Performed by: STUDENT IN AN ORGANIZED HEALTH CARE EDUCATION/TRAINING PROGRAM

## 2024-12-23 RX ORDER — LIDOCAINE HYDROCHLORIDE 10 MG/ML
INJECTION, SOLUTION EPIDURAL; INFILTRATION; INTRACAUDAL; PERINEURAL AS NEEDED
Status: DISCONTINUED | OUTPATIENT
Start: 2024-12-23 | End: 2024-12-23

## 2024-12-23 RX ORDER — PROPOFOL 10 MG/ML
INJECTION, EMULSION INTRAVENOUS AS NEEDED
Status: DISCONTINUED | OUTPATIENT
Start: 2024-12-23 | End: 2024-12-23

## 2024-12-23 RX ORDER — PROPOFOL 10 MG/ML
INJECTION, EMULSION INTRAVENOUS CONTINUOUS PRN
Status: DISCONTINUED | OUTPATIENT
Start: 2024-12-23 | End: 2024-12-23

## 2024-12-23 RX ORDER — SODIUM CHLORIDE, SODIUM LACTATE, POTASSIUM CHLORIDE, CALCIUM CHLORIDE 600; 310; 30; 20 MG/100ML; MG/100ML; MG/100ML; MG/100ML
INJECTION, SOLUTION INTRAVENOUS CONTINUOUS PRN
Status: DISCONTINUED | OUTPATIENT
Start: 2024-12-23 | End: 2024-12-23

## 2024-12-23 RX ADMIN — PROPOFOL 100 MG: 10 INJECTION, EMULSION INTRAVENOUS at 10:48

## 2024-12-23 RX ADMIN — SODIUM CHLORIDE, SODIUM LACTATE, POTASSIUM CHLORIDE, AND CALCIUM CHLORIDE: .6; .31; .03; .02 INJECTION, SOLUTION INTRAVENOUS at 10:41

## 2024-12-23 RX ADMIN — PROPOFOL 100 MCG/KG/MIN: 10 INJECTION, EMULSION INTRAVENOUS at 10:48

## 2024-12-23 RX ADMIN — LIDOCAINE HYDROCHLORIDE 50 MG: 10 INJECTION, SOLUTION EPIDURAL; INFILTRATION; INTRACAUDAL at 10:48

## 2024-12-23 NOTE — ANESTHESIA POSTPROCEDURE EVALUATION
Post-Op Assessment Note    CV Status:  Stable  Pain Score: 0    Pain management: adequate       Mental Status:  Alert and awake   Hydration Status:  Euvolemic   PONV Controlled:  Controlled   Airway Patency:  Patent     Post Op Vitals Reviewed: Yes    No anethesia notable event occurred.    Staff: Anesthesiologist, CRNA           Last Filed PACU Vitals:  Vitals Value Taken Time   Temp     Pulse 106 12/23/24 1121   /88 12/23/24 1121   Resp 20 12/23/24 1121   SpO2 97 % 12/23/24 1121       Modified Travon:  Activity: 2 (12/23/2024 11:22 AM)  Respiration: 2 (12/23/2024 11:22 AM)  Circulation: 2 (12/23/2024 11:22 AM)  Consciousness: 2 (12/23/2024 11:22 AM)  Oxygen Saturation: 2 (12/23/2024 11:22 AM)  Modified Travon Score: 10 (12/23/2024 11:22 AM)

## 2024-12-23 NOTE — ANESTHESIA PREPROCEDURE EVALUATION
Procedure:  COLONOSCOPY    Relevant Problems   ANESTHESIA (within normal limits)      CARDIO   (+) Mixed hyperlipidemia      GI/HEPATIC   (+) Gastroesophageal reflux disease without esophagitis      MUSCULOSKELETAL   (+) Gout   (+) Lumbar spondylosis      NEURO/PSYCH   (+) Chronic pain syndrome      PULMONARY   (+) Mild intermittent asthma without complication      Endocrine   (+) Impaired fasting blood sugar      Rheumatology   (+) Lumbar disc herniation with radiculopathy        Physical Exam    Airway  Comment: Large neck circumference   Mallampati score: III  TM Distance: >3 FB  Neck ROM: full     Dental   No notable dental hx     Cardiovascular      Pulmonary      Other Findings        Anesthesia Plan  ASA Score- 3     Anesthesia Type- IV sedation with anesthesia with ASA Monitors.         Additional Monitors:     Airway Plan:            Plan Factors-Exercise tolerance (METS): >4 METS.    Chart reviewed. EKG reviewed.  Existing labs reviewed. Patient summary reviewed.    Patient is not a current smoker.              Induction- intravenous.    Postoperative Plan-         Informed Consent- Anesthetic plan and risks discussed with patient.  I personally reviewed this patient with the CRNA. Discussed and agreed on the Anesthesia Plan with the CRNA..

## 2024-12-23 NOTE — H&P
History and Physical -  [facility]  Tiburcio Rashid 54 y.o. male MRN: [id number]                  HPI: Tiburcio Rashid is a 54 y.o. year old male who presents for colonoscopy for colon cancer screening. History of recurrent diverticulitis c/b sigmoid perforation s/p resection in 2012. Last colonoscopy in 2012 - no history of polyps. Not currently on AP/AC.      REVIEW OF SYSTEMS: Per the HPI, and otherwise unremarkable.    Historical Information   Past Medical History:   Diagnosis Date    Asthma     Asthmatic bronchitis     Body mass index 34.0-34.9, adult     Convulsions (HCC)     Diverticulitis     Diverticulosis     sigmoid colon - on colonoscopy 1/31/2011     Gastric ulcer     Gout     Left shoulder pain     Lumbar disc herniation with radiculopathy     Osteoarthritis     Pharyngitis     Pulmonary embolism (HCC) 08/15/2012    Right knee pain     Rotator cuff syndrome of left shoulder     Routine general medical examination at a health care facility     SOB (shortness of breath)     Spontaneous rupture of tendon of left shoulder     Syncope and collapse     Vestibular neuritis      Past Surgical History:   Procedure Laterality Date    COLON SURGERY  08/08/2012    Sigmoid colon removed for diverticulosis     COLONOSCOPY      EGD AND COLONOSCOPY  01/31/2011    Dr. So    HEMORROIDECTOMY N/A     SINUS SURGERY       Social History   Social History     Substance and Sexual Activity   Alcohol Use Not Currently     Social History     Substance and Sexual Activity   Drug Use Never     Social History     Tobacco Use   Smoking Status Never   Smokeless Tobacco Never     Family History   Problem Relation Age of Onset    Hypertension Mother     Hyperlipidemia Mother     Cancer Father        Meds/Allergies       Current Outpatient Medications:     allopurinol (ZYLOPRIM) 100 mg tablet    colchicine (COLCRYS) 0.6 mg tablet    methocarbamol (ROBAXIN) 500 mg tablet    albuterol (PROVENTIL HFA,VENTOLIN HFA)  "90 mcg/act inhaler    omeprazole (PriLOSEC) 20 mg delayed release capsule    tiZANidine (ZANAFLEX) 4 mg tablet  No current facility-administered medications for this encounter.    Allergies   Allergen Reactions    Cefazolin Hives    Metronidazole Hives    Levofloxacin Palpitations       Objective     /75   Pulse 105   Temp 98.1 °F (36.7 °C) (Temporal)   Resp 18   Ht 6' 3\" (1.905 m)   Wt (!) 152 kg (335 lb)   SpO2 95%   BMI 41.87 kg/m²       PHYSICAL EXAM    Gen: NAD  Head: NCAT  CV: RRR  CHEST: Clear  ABD: soft, NT/ND  EXT: no edema      ASSESSMENT/PLAN:  This is a 54 y.o. year old male here for colonoscopy, and he is stable and optimized for his procedure.        "

## 2024-12-24 ENCOUNTER — TELEPHONE (OUTPATIENT)
Age: 54
End: 2024-12-24

## 2024-12-24 DIAGNOSIS — G47.33 OSA (OBSTRUCTIVE SLEEP APNEA): Primary | ICD-10-CM

## 2024-12-24 LAB

## 2024-12-24 NOTE — TELEPHONE ENCOUNTER
Patient spouse called because patient stopped breathing during his colonoscopy and patient is asking for a c-pap machine to be prescribed for him      Please advise.

## 2024-12-26 ENCOUNTER — TELEPHONE (OUTPATIENT)
Dept: SLEEP CENTER | Facility: CLINIC | Age: 54
End: 2024-12-26

## 2024-12-26 DIAGNOSIS — G47.33 OSA (OBSTRUCTIVE SLEEP APNEA): Primary | ICD-10-CM

## 2024-12-26 DIAGNOSIS — E66.01 CLASS 3 SEVERE OBESITY DUE TO EXCESS CALORIES WITH SERIOUS COMORBIDITY AND BODY MASS INDEX (BMI) OF 40.0 TO 44.9 IN ADULT (HCC): ICD-10-CM

## 2024-12-26 DIAGNOSIS — E66.813 CLASS 3 SEVERE OBESITY DUE TO EXCESS CALORIES WITH SERIOUS COMORBIDITY AND BODY MASS INDEX (BMI) OF 40.0 TO 44.9 IN ADULT (HCC): ICD-10-CM

## 2024-12-26 DIAGNOSIS — J45.20 MILD INTERMITTENT ASTHMA WITHOUT COMPLICATION: ICD-10-CM

## 2024-12-26 DIAGNOSIS — R73.01 IMPAIRED FASTING BLOOD SUGAR: ICD-10-CM

## 2024-12-26 PROCEDURE — 88305 TISSUE EXAM BY PATHOLOGIST: CPT | Performed by: STUDENT IN AN ORGANIZED HEALTH CARE EDUCATION/TRAINING PROGRAM

## 2024-12-26 NOTE — TELEPHONE ENCOUNTER
Referral placed for a home sleep study. Patient will need to be evaluated with a face to face consultation. Symptoms must include more than snoring.     Please place new referral for a sleep consultation.      Ordering and co-signing providers notified.

## 2024-12-27 DIAGNOSIS — M10.062 ACUTE IDIOPATHIC GOUT OF LEFT KNEE: ICD-10-CM

## 2024-12-27 RX ORDER — ALLOPURINOL 100 MG/1
200 TABLET ORAL DAILY
Qty: 60 TABLET | Refills: 0 | Status: SHIPPED | OUTPATIENT
Start: 2024-12-27

## 2024-12-29 ENCOUNTER — RESULTS FOLLOW-UP (OUTPATIENT)
Age: 54
End: 2024-12-29

## 2024-12-30 NOTE — TELEPHONE ENCOUNTER
----- Message from Katherine GARCIA sent at 12/30/2024  7:41 AM EST -----    ----- Message -----  From: Manju Finch DO  Sent: 12/30/2024  12:58 AM EST  To: Yana Cunha MD; #    Results of colonoscopy biopsies received and reviewed. Patient with x4 tubular adenomas and x1 hyperplastic polyp. Recommend repeat colonoscopy in 3 years. Please place recall. Message sent to patient via [employer] to inform him of the results. Thank you.

## 2024-12-31 ENCOUNTER — OFFICE VISIT (OUTPATIENT)
Dept: SLEEP CENTER | Facility: CLINIC | Age: 54
End: 2024-12-31
Payer: COMMERCIAL

## 2024-12-31 VITALS
BODY MASS INDEX: 39.17 KG/M2 | HEART RATE: 98 BPM | SYSTOLIC BLOOD PRESSURE: 134 MMHG | DIASTOLIC BLOOD PRESSURE: 84 MMHG | HEIGHT: 75 IN | OXYGEN SATURATION: 96 % | WEIGHT: 315 LBS

## 2024-12-31 DIAGNOSIS — E66.01 CLASS 3 SEVERE OBESITY DUE TO EXCESS CALORIES WITH SERIOUS COMORBIDITY AND BODY MASS INDEX (BMI) OF 40.0 TO 44.9 IN ADULT (HCC): ICD-10-CM

## 2024-12-31 DIAGNOSIS — E66.813 CLASS 3 SEVERE OBESITY DUE TO EXCESS CALORIES WITH SERIOUS COMORBIDITY AND BODY MASS INDEX (BMI) OF 40.0 TO 44.9 IN ADULT (HCC): ICD-10-CM

## 2024-12-31 DIAGNOSIS — J45.20 MILD INTERMITTENT ASTHMA WITHOUT COMPLICATION: ICD-10-CM

## 2024-12-31 DIAGNOSIS — G47.33 OSA (OBSTRUCTIVE SLEEP APNEA): ICD-10-CM

## 2024-12-31 DIAGNOSIS — R73.01 IMPAIRED FASTING BLOOD SUGAR: ICD-10-CM

## 2024-12-31 PROCEDURE — 99204 OFFICE O/P NEW MOD 45 MIN: CPT | Performed by: INTERNAL MEDICINE

## 2024-12-31 NOTE — ASSESSMENT & PLAN NOTE
High probability of moderate to severe obstructive sleep apnea with Mallampati 4 excessive daytime sleepiness, witnessed apneas, choking or gasping during sleep  Elevated BMI - not a candidate for HGN therapy  Poor quality sleep, only requiring 5 hours of sleep overnight, difficulty falling asleep.  Taking naps during the day    Split-night study ordered  With any degree of sleep apnea, PAP therapy is recommended  Follow-up 30- 90 days after CPAP initiation  Orders:    Ambulatory Referral to Sleep Medicine    Split Study; Future

## 2024-12-31 NOTE — PROGRESS NOTES
Name: Tiburcio Rashid      : 1970      MRN: [id number]  Encounter Provider: Jeff Romero MD  Encounter Date: 2024   Encounter department: [facility]  :  Assessment & Plan  JEIMY (obstructive sleep apnea)  High probability of moderate to severe obstructive sleep apnea with Mallampati 4 excessive daytime sleepiness, witnessed apneas, choking or gasping during sleep  Elevated BMI - not a candidate for HGN therapy  Poor quality sleep, only requiring 5 hours of sleep overnight, difficulty falling asleep.  Taking naps during the day    Split-night study ordered  With any degree of sleep apnea, PAP therapy is recommended  Follow-up 30- 90 days after CPAP initiation  Orders:    Ambulatory Referral to Sleep Medicine    Split Study; Future    Class 3 severe obesity due to excess calories with serious comorbidity and body mass index (BMI) of 40.0 to 44.9 in adult (HCC)  Possible candidate for GLP-1 inhibitors.  Zepbound is approved by FDA for treatment of moderate to severe JEIMY but current insurance approvals for this indication has not been clarified.  Recommend that he follow-up with PCP for consideration of starting GLP-1 inhibitor  Orders:    Ambulatory Referral to Sleep Medicine        History of Present Illness   HPI 54-year-old male with a history of seasonal allergies, mild intermittent asthma,  Lumbar disc herniation with radiculopathy, gout, hyperlipidemia, diverticulosis, who is presenting for the evaluation of suspected obstructive sleep apnea    Patient endorses poor quality sleep for many years.  This is coincided with weight gain.  He has had gasping for air that was woken himself up from sleep.  Wife sees him gasping and choking during the middle of sleep especially in the supine position.  He has loud snoring.  He has daytime sleepiness and appears drowsy today in the office visit.    He has difficulty falling asleep in the beginning night but he goes to bed around 11 PM.  He  sometimes takes Tylenol PM or NyQuil to help him get into sleep.  He wakes up 3-4 times per night and uses the bathroom.  He usually gets out of bed around 8 AM with the help of multiple alarms.  He naps frequently and dozes off at least once a week without knowing it.  His naps last about an hour.  He uses coffee, caffeinated soda, hot tea to help him stay awake.  He has no  problem breathing through his nose.    His wife notes that he has frequent leg jerks and spasms throughout the night.  No parasomnias otherwise.    He may have had tonsillectomy as a child.      No significant alcohol, tobacco, drug use.            Sitting and reading: (Patient-Rptd) Moderate chance of dozing  Watching TV: (Patient-Rptd) Moderate chance of dozing  Sitting, inactive in a public place (e.g. a theatre or a meeting): (Patient-Rptd) Slight chance of dozing  As a passenger in a car for an hour without a break: (Patient-Rptd) Would never doze  Lying down to rest in the afternoon when circumstances permit: (Patient-Rptd) Slight chance of dozing  Sitting and talking to someone: (Patient-Rptd) Would never doze  Sitting quietly after a lunch without alcohol: (Patient-Rptd) Slight chance of dozing  In a car, while stopped for a few minutes in traffic: (Patient-Rptd) Would never doze  Total score: (Patient-Rptd) 7     Review of Systems  Pertinent positives/negatives included in HPI and also as noted:     Past Medical History   Past Medical History:   Diagnosis Date    Asthma     Asthmatic bronchitis     Body mass index 34.0-34.9, adult     Convulsions (HCC)     Diverticulitis     Diverticulosis     sigmoid colon - on colonoscopy 1/31/2011     Gastric ulcer     Gout     Left shoulder pain     Lumbar disc herniation with radiculopathy     Osteoarthritis     Pharyngitis     Pulmonary embolism (HCC) 08/15/2012    Right knee pain     Rotator cuff syndrome of left shoulder     Routine general medical examination at a health care facility     SOB  "(shortness of breath)     Spontaneous rupture of tendon of left shoulder     Syncope and collapse     Vestibular neuritis      Past Surgical History:   Procedure Laterality Date    COLON SURGERY  08/08/2012    Sigmoid colon removed for diverticulosis     COLONOSCOPY      EGD AND COLONOSCOPY  01/31/2011    Dr. So    HEMORROIDECTOMY N/A     SINUS SURGERY       Family History   Problem Relation Age of Onset    Hypertension Mother     Hyperlipidemia Mother     Cancer Father       reports that he has never smoked. He has never used smokeless tobacco. He reports that he does not currently use alcohol. He reports that he does not use drugs.  Current Outpatient Medications on File Prior to Visit   Medication Sig Dispense Refill    albuterol (PROVENTIL HFA,VENTOLIN HFA) 90 mcg/act inhaler Inhale 2 puffs every 6 (six) hours as needed for wheezing 8 g 1    allopurinol (ZYLOPRIM) 100 mg tablet Take 2 tablets (200 mg total) by mouth daily 60 tablet 0    colchicine (COLCRYS) 0.6 mg tablet Take 1 tablet (0.6 mg total) by mouth daily 30 tablet 0    omeprazole (PriLOSEC) 20 mg delayed release capsule Take 1 capsule (20 mg total) by mouth 2 (two) times a day 60 capsule 1    tiZANidine (ZANAFLEX) 4 mg tablet Take 1 tablet (4 mg total) by mouth every 8 (eight) hours as needed for muscle spasms 40 tablet 0    methocarbamol (ROBAXIN) 500 mg tablet Take 1 tablet (500 mg total) by mouth 3 (three) times a day for 5 days 15 tablet 0     No current facility-administered medications on file prior to visit.     Allergies   Allergen Reactions    Cefazolin Hives    Metronidazole Hives    Levofloxacin Palpitations      Objective   /84   Pulse 98   Ht 6' 3\" (1.905 m)   Wt (!) 154 kg (340 lb)   SpO2 96%   BMI 42.50 kg/m²     Neck Circumference: 21.5  Physical Exam  Constitutional:       General: He is not in acute distress.     Appearance: Normal appearance. He is not ill-appearing, toxic-appearing or diaphoretic.   HENT:      " "Head: Normocephalic and atraumatic.      Mouth/Throat:      Mouth: Mucous membranes are moist.      Pharynx: Oropharynx is clear. No oropharyngeal exudate.      Comments: Mallampati 4  Eyes:      General: No scleral icterus.     Extraocular Movements: Extraocular movements intact.      Conjunctiva/sclera: Conjunctivae normal.   Cardiovascular:      Rate and Rhythm: Normal rate and regular rhythm.   Pulmonary:      Effort: Pulmonary effort is normal. No respiratory distress.      Breath sounds: Normal breath sounds. No wheezing, rhonchi or rales.   Abdominal:      Tenderness: There is no guarding.   Musculoskeletal:         General: Normal range of motion.      Cervical back: Normal range of motion and neck supple. No rigidity.      Right lower leg: No edema.      Left lower leg: No edema.   Skin:     Coloration: Skin is not jaundiced or pale.   Neurological:      General: No focal deficit present.      Mental Status: He is alert. Mental status is at baseline.   Psychiatric:         Mood and Affect: Mood normal.         Data  Lab Results   Component Value Date    HGB 16.2 12/21/2023    HCT 49.2 12/21/2023    MCV 89 12/21/2023      Lab Results   Component Value Date    CALCIUM 9.6 12/21/2023    K 4.8 12/21/2023    CO2 25 12/21/2023     12/21/2023    BUN 15 12/21/2023    CREATININE 0.96 12/21/2023     No results found for: \"IRON\", \"TIBC\", \"FERRITIN\"  Lab Results   Component Value Date    AST 31 12/21/2023    ALT 35 12/21/2023         "

## 2025-01-02 ENCOUNTER — HOSPITAL ENCOUNTER (OUTPATIENT)
Dept: SLEEP CENTER | Facility: CLINIC | Age: 55
Discharge: HOME | End: 2025-01-02
Payer: COMMERCIAL

## 2025-01-02 DIAGNOSIS — G47.33 OSA (OBSTRUCTIVE SLEEP APNEA): ICD-10-CM

## 2025-01-02 PROCEDURE — 95811 POLYSOM 6/>YRS CPAP 4/> PARM: CPT | Performed by: INTERNAL MEDICINE

## 2025-01-02 PROCEDURE — 95811 POLYSOM 6/>YRS CPAP 4/> PARM: CPT

## 2025-01-03 DIAGNOSIS — G47.33 OSA (OBSTRUCTIVE SLEEP APNEA): Primary | ICD-10-CM

## 2025-01-03 NOTE — PROGRESS NOTES
Sleep Study Documentation    Pre-Sleep Study       Sleep testing procedure explained to patient:YES    Patient napped prior to study:NO    Caffeine:Dayshift worker after 12PM.  Caffeine use:NO    Alcohol:Dayshift workers after 5PM: Alcohol use:NO    Typical day for patient:YES       Study Documentation    Sleep Study Indications: JEIMY    Sleep Study: Split Optimal PAP pressure: 21/17  Leak:Large  Snore:Eliminated  REM Obtained:yes  Supplemental O2: no    Minimum SaO2 75  Baseline SaO2 93  PAP mask tried (list all)Dreamwear Full  PAP mask choice (final)Dreamwear Full  PAP mask type:full face  PAP pressure at which snoring was eliminated 18/14  Minimum SaO2 at final PAP pressure 75  Mode of Therapy:CPAP  ETCO2:No  CPAP changed to BiPAP:Yes. If yes why Better comfort and tolerance due to need for increased pressure    Mode of Therapy:BiPAP    EKG abnormalities: no     EEG abnormalities: no    Were abnormal behaviors in sleep observed:NO    Is Total Sleep Study Recording Time < 2 hours: N/A    Is Total Sleep Study Recording Time > 2 hours but study is incomplete: N/A    Is Total Sleep Study Recording Time 6 hours or more but sleep was not obtained: NO    Patient classification: employed       Post-Sleep Study    Medication used at bedtime or during sleep study:NO    Patient reports time it took to fall asleep:greater than 60 minutes    Patient reports waking up during study:1 to 2 times.  Patient reports returning to sleep in greater than 30 minutes.    Patient reports sleeping 2 to 4 hours with dreaming.    Does the Patient feel this is a typical night of sleep:typical    Patient rated sleepiness: Somewhat sleepy or tired    PAP treatment:yes: Post PAP treatment patient reports feeling unchanged and would wear PAP mask at home.

## 2025-01-06 ENCOUNTER — TELEPHONE (OUTPATIENT)
Dept: SLEEP CENTER | Facility: CLINIC | Age: 55
End: 2025-01-06

## 2025-01-06 ENCOUNTER — RESULTS FOLLOW-UP (OUTPATIENT)
Dept: PULMONOLOGY | Facility: CLINIC | Age: 55
End: 2025-01-06

## 2025-01-06 NOTE — TELEPHONE ENCOUNTER
Rx for BiPAP resupply and clinicals sent to [facility] via [place] with request to expedite.

## 2025-01-06 NOTE — TELEPHONE ENCOUNTER
Hi Mr. Rashid,     Your sleep study showed pretty severe obstructive sleep apnea.  Ultimately CPAP was not enough and we had to switch to BiPAP to control the obstructive sleep apnea.  You should receive a call this week from our staff and the sleep medicine department water neck steps are.  We have prescribed a BiPAP for you.     Jeff Romero  --------------------------------------------------    Called patient and advised of sleep study results.      Patient agreeable to use [employer] as DME provider.  Advised patient that the BiPAP Rx will be sent to [facility] and one of their representatives should reach out within the next few days to schedule a set up appointment in the office.  Advised a request will be sent to expedite order due to severity of sleep apnea.      In a separate encounter note, Rx for BiPAP resupply and clinicals sent to [facility] via [place] with request to expedite.      Scheduled compliance follow up 4/1/25.

## 2025-01-06 NOTE — TELEPHONE ENCOUNTER
----- Message from Jeff Romero MD sent at 1/6/2025 10:12 AM EST -----  Please prioritize calling this patient - JEIMY is pretty bad

## 2025-01-09 LAB

## 2025-01-10 ENCOUNTER — TELEPHONE (OUTPATIENT)
Dept: INTERNAL MEDICINE CLINIC | Facility: CLINIC | Age: 55
End: 2025-01-10

## 2025-01-10 ENCOUNTER — DOCUMENTATION (OUTPATIENT)
Dept: SLEEP CENTER | Facility: CLINIC | Age: 55
End: 2025-01-10

## 2025-01-10 DIAGNOSIS — G47.33 OSA (OBSTRUCTIVE SLEEP APNEA): ICD-10-CM

## 2025-01-10 DIAGNOSIS — M10.062 ACUTE IDIOPATHIC GOUT OF LEFT KNEE: ICD-10-CM

## 2025-01-10 DIAGNOSIS — E66.813 CLASS 3 SEVERE OBESITY DUE TO EXCESS CALORIES WITH SERIOUS COMORBIDITY AND BODY MASS INDEX (BMI) OF 40.0 TO 44.9 IN ADULT (HCC): Primary | ICD-10-CM

## 2025-01-10 DIAGNOSIS — E66.01 CLASS 3 SEVERE OBESITY DUE TO EXCESS CALORIES WITH SERIOUS COMORBIDITY AND BODY MASS INDEX (BMI) OF 40.0 TO 44.9 IN ADULT (HCC): Primary | ICD-10-CM

## 2025-01-10 RX ORDER — TIRZEPATIDE 2.5 MG/.5ML
2.5 INJECTION, SOLUTION SUBCUTANEOUS WEEKLY
Qty: 2 ML | Refills: 0 | Status: SHIPPED | OUTPATIENT
Start: 2025-01-10 | End: 2025-02-07

## 2025-01-10 NOTE — PROGRESS NOTES
Patient scheduled for DME set up yesterday, 1/9/2025 at [facility].   MACHINE: Aircurve 11 vauto  PRESSURE: 25/14 PS: 4  MASK: Dreamwear FFM medium  TOLERATION: well  PROVIDED INFORMATION: Welcome guide emailed to patient, DME numbers to contact 24/7, cleaning and replacement schedules.   PATIENT STATUS: NEW  MOBILE MARSHALL PAIRED: (Y)    Suzanne Warner RRT.

## 2025-01-13 RX ORDER — ALLOPURINOL 100 MG/1
200 TABLET ORAL DAILY
Qty: 180 TABLET | Refills: 1 | Status: SHIPPED | OUTPATIENT
Start: 2025-01-13

## 2025-01-13 NOTE — TELEPHONE ENCOUNTER
PA for (Zepbound) 2.5 mg/0.5 mL auto-injector SUBMITTED to [facility]    via    []CMM-KEY:   [x][employer]-Case ID # [id number]   []Availity-Auth ID # NDC #   []Faxed to plan   []Other website   []Phone call Case ID #     []PA sent as URGENT    All office notes, labs and other pertaining documents and studies sent. Clinical questions answered. Awaiting determination from insurance company.     Turnaround time for your insurance to make a decision on your Prior Authorization can take 7-21 business days.

## 2025-01-16 NOTE — TELEPHONE ENCOUNTER
PA for (Zepbound) 2.5 mg/0.5 mL auto-injector APPROVED     Date(s) approved 01/13/25-09/13/25    Case # [id number]    Patient advised by          [][employer] Message  []Phone call   [x]LMOM  []L/M to call office as no active Communication consent on file  []Unable to leave detailed message as VM not approved on Communication consent       Pharmacy advised by    [x]Fax  []Phone call    Approval letter scanned into [employer] , not available at time of approval

## 2025-02-21 ENCOUNTER — TELEPHONE (OUTPATIENT)
Dept: INTERNAL MEDICINE CLINIC | Facility: CLINIC | Age: 55
End: 2025-02-21

## 2025-02-26 DIAGNOSIS — G47.33 OSA (OBSTRUCTIVE SLEEP APNEA): ICD-10-CM

## 2025-02-26 DIAGNOSIS — E66.01 CLASS 3 SEVERE OBESITY DUE TO EXCESS CALORIES WITH SERIOUS COMORBIDITY AND BODY MASS INDEX (BMI) OF 40.0 TO 44.9 IN ADULT (HCC): ICD-10-CM

## 2025-02-26 DIAGNOSIS — E66.813 CLASS 3 SEVERE OBESITY DUE TO EXCESS CALORIES WITH SERIOUS COMORBIDITY AND BODY MASS INDEX (BMI) OF 40.0 TO 44.9 IN ADULT (HCC): ICD-10-CM

## 2025-02-27 RX ORDER — TIRZEPATIDE 2.5 MG/.5ML
2.5 INJECTION, SOLUTION SUBCUTANEOUS WEEKLY
Qty: 2 ML | Refills: 2 | Status: SHIPPED | OUTPATIENT
Start: 2025-02-27 | End: 2025-03-27

## 2025-03-18 ENCOUNTER — TELEPHONE (OUTPATIENT)
Dept: INTERNAL MEDICINE CLINIC | Facility: CLINIC | Age: 55
End: 2025-03-18

## 2025-04-02 ENCOUNTER — TELEPHONE (OUTPATIENT)
Age: 55
End: 2025-04-02

## 2025-04-10 DIAGNOSIS — M51.16 LUMBAR DISC HERNIATION WITH RADICULOPATHY: ICD-10-CM

## 2025-04-10 RX ORDER — PREDNISONE 50 MG/1
50 TABLET ORAL DAILY
Qty: 7 TABLET | Refills: 0 | Status: SHIPPED | OUTPATIENT
Start: 2025-04-10 | End: 2025-04-17

## 2025-06-22 DIAGNOSIS — M51.16 LUMBAR DISC HERNIATION WITH RADICULOPATHY: ICD-10-CM

## 2025-06-23 RX ORDER — PREDNISONE 50 MG/1
50 TABLET ORAL DAILY
Qty: 7 TABLET | Refills: 0 | Status: SHIPPED | OUTPATIENT
Start: 2025-06-23 | End: 2025-06-30

## 2025-06-23 NOTE — TELEPHONE ENCOUNTER
Sent patient a [employer] message to schedule an appointment for refills last seen 08/02/2024